# Patient Record
Sex: MALE | Race: WHITE | NOT HISPANIC OR LATINO | Employment: OTHER | URBAN - METROPOLITAN AREA
[De-identification: names, ages, dates, MRNs, and addresses within clinical notes are randomized per-mention and may not be internally consistent; named-entity substitution may affect disease eponyms.]

---

## 2017-01-10 DIAGNOSIS — I10 ESSENTIAL (PRIMARY) HYPERTENSION: ICD-10-CM

## 2017-01-10 DIAGNOSIS — E78.1 PURE HYPERGLYCERIDEMIA: ICD-10-CM

## 2017-01-10 DIAGNOSIS — R73.09 OTHER ABNORMAL GLUCOSE: ICD-10-CM

## 2017-01-10 DIAGNOSIS — Z12.5 ENCOUNTER FOR SCREENING FOR MALIGNANT NEOPLASM OF PROSTATE: ICD-10-CM

## 2017-01-10 DIAGNOSIS — E78.5 HYPERLIPIDEMIA: ICD-10-CM

## 2017-01-10 DIAGNOSIS — Z13.6 ENCOUNTER FOR SCREENING FOR CARDIOVASCULAR DISORDERS: ICD-10-CM

## 2017-01-17 ENCOUNTER — GENERIC CONVERSION - ENCOUNTER (OUTPATIENT)
Dept: OTHER | Facility: OTHER | Age: 67
End: 2017-01-17

## 2017-01-17 LAB
A/G RATIO (HISTORICAL): 1.9 (ref 1.1–2.5)
ALBUMIN SERPL BCP-MCNC: 4.2 G/DL (ref 3.6–4.8)
ALP SERPL-CCNC: 99 IU/L (ref 39–117)
ALT SERPL W P-5'-P-CCNC: 20 IU/L (ref 0–44)
AMBIG ABBREV CMP14 DEFAULT (HISTORICAL): NORMAL
AST SERPL W P-5'-P-CCNC: 18 IU/L (ref 0–40)
BILIRUB SERPL-MCNC: 0.3 MG/DL (ref 0–1.2)
BUN SERPL-MCNC: 32 MG/DL (ref 8–27)
BUN/CREA RATIO (HISTORICAL): 19 (ref 10–22)
CALCIUM SERPL-MCNC: 9.2 MG/DL (ref 8.6–10.2)
CHLORIDE SERPL-SCNC: 99 MMOL/L (ref 96–106)
CHOLEST SERPL-MCNC: 289 MG/DL (ref 100–199)
CO2 SERPL-SCNC: 21 MMOL/L (ref 18–29)
CREAT SERPL-MCNC: 1.68 MG/DL (ref 0.76–1.27)
EGFR AFRICAN AMERICAN (HISTORICAL): 49 ML/MIN/1.73
EGFR-AMERICAN CALC (HISTORICAL): 42 ML/MIN/1.73
GLUCOSE SERPL-MCNC: 112 MG/DL (ref 65–99)
HBA1C MFR BLD HPLC: 6 % (ref 4.8–5.6)
HDLC SERPL-MCNC: 38 MG/DL
LDL CHOLESTEROL DIRECT (HISTORICAL): 161 MG/DL (ref 0–99)
LDLC SERPL CALC-MCNC: ABNORMAL MG/DL (ref 0–99)
POTASSIUM SERPL-SCNC: 4.3 MMOL/L (ref 3.5–5.2)
PROSTATE SPECIFIC ANTIGEN (HISTORICAL): 2.6 NG/ML (ref 0–4)
SODIUM SERPL-SCNC: 136 MMOL/L (ref 134–144)
TOT. GLOBULIN, SERUM (HISTORICAL): 2.2 G/DL (ref 1.5–4.5)
TOTAL PROTEIN (HISTORICAL): 6.4 G/DL (ref 6–8.5)
TRIGL SERPL-MCNC: 537 MG/DL (ref 0–149)

## 2017-01-18 ENCOUNTER — GENERIC CONVERSION - ENCOUNTER (OUTPATIENT)
Dept: OTHER | Facility: OTHER | Age: 67
End: 2017-01-18

## 2017-01-21 LAB
INTERPRETATION (HISTORICAL): NORMAL
INTERPRETATION (HISTORICAL): NORMAL
PDF IMAGE (HISTORICAL): NORMAL

## 2017-01-23 ENCOUNTER — ALLSCRIPTS OFFICE VISIT (OUTPATIENT)
Dept: OTHER | Facility: OTHER | Age: 67
End: 2017-01-23

## 2017-03-13 ENCOUNTER — ALLSCRIPTS OFFICE VISIT (OUTPATIENT)
Dept: OTHER | Facility: OTHER | Age: 67
End: 2017-03-13

## 2017-04-25 ENCOUNTER — GENERIC CONVERSION - ENCOUNTER (OUTPATIENT)
Dept: OTHER | Facility: OTHER | Age: 67
End: 2017-04-25

## 2017-04-25 LAB
A/G RATIO (HISTORICAL): 1.9 (ref 1.2–2.2)
ALBUMIN SERPL BCP-MCNC: 4.3 G/DL (ref 3.6–4.8)
ALP SERPL-CCNC: 84 IU/L (ref 39–117)
ALT SERPL W P-5'-P-CCNC: 23 IU/L (ref 0–44)
AST SERPL W P-5'-P-CCNC: 22 IU/L (ref 0–40)
BILIRUB SERPL-MCNC: 0.4 MG/DL (ref 0–1.2)
BUN SERPL-MCNC: 31 MG/DL (ref 8–27)
BUN/CREA RATIO (HISTORICAL): 21 (ref 10–24)
CALCIUM SERPL-MCNC: 9.1 MG/DL (ref 8.6–10.2)
CHLORIDE SERPL-SCNC: 102 MMOL/L (ref 96–106)
CO2 SERPL-SCNC: 20 MMOL/L (ref 18–29)
CREAT SERPL-MCNC: 1.46 MG/DL (ref 0.76–1.27)
EGFR AFRICAN AMERICAN (HISTORICAL): 57 ML/MIN/1.73
EGFR-AMERICAN CALC (HISTORICAL): 49 ML/MIN/1.73
GLUCOSE SERPL-MCNC: 117 MG/DL (ref 65–99)
POTASSIUM SERPL-SCNC: 4.5 MMOL/L (ref 3.5–5.2)
SODIUM SERPL-SCNC: 140 MMOL/L (ref 134–144)
TOT. GLOBULIN, SERUM (HISTORICAL): 2.3 G/DL (ref 1.5–4.5)
TOTAL PROTEIN (HISTORICAL): 6.6 G/DL (ref 6–8.5)

## 2017-04-27 ENCOUNTER — GENERIC CONVERSION - ENCOUNTER (OUTPATIENT)
Dept: OTHER | Facility: OTHER | Age: 67
End: 2017-04-27

## 2017-04-29 ENCOUNTER — GENERIC CONVERSION - ENCOUNTER (OUTPATIENT)
Dept: OTHER | Facility: OTHER | Age: 67
End: 2017-04-29

## 2017-04-29 LAB
CHOLEST SERPL-MCNC: 272 MG/DL (ref 100–199)
HDLC SERPL-MCNC: 39 MG/DL
INTERPRETATION (HISTORICAL): NORMAL
INTERPRETATION (HISTORICAL): NORMAL
LDL CHOLESTEROL DIRECT (HISTORICAL): 152 MG/DL (ref 0–99)
LDLC SERPL CALC-MCNC: ABNORMAL MG/DL (ref 0–99)
PDF IMAGE (HISTORICAL): NORMAL
TRIGL SERPL-MCNC: 451 MG/DL (ref 0–149)

## 2017-06-19 ENCOUNTER — ALLSCRIPTS OFFICE VISIT (OUTPATIENT)
Dept: OTHER | Facility: OTHER | Age: 67
End: 2017-06-19

## 2017-07-19 ENCOUNTER — GENERIC CONVERSION - ENCOUNTER (OUTPATIENT)
Dept: OTHER | Facility: OTHER | Age: 67
End: 2017-07-19

## 2017-07-24 ENCOUNTER — ALLSCRIPTS OFFICE VISIT (OUTPATIENT)
Dept: OTHER | Facility: OTHER | Age: 67
End: 2017-07-24

## 2017-07-24 DIAGNOSIS — E78.1 PURE HYPERGLYCERIDEMIA: ICD-10-CM

## 2017-07-24 DIAGNOSIS — M79.609 PAIN IN EXTREMITY: ICD-10-CM

## 2017-07-25 ENCOUNTER — GENERIC CONVERSION - ENCOUNTER (OUTPATIENT)
Dept: OTHER | Facility: OTHER | Age: 67
End: 2017-07-25

## 2017-07-25 LAB
A/G RATIO (HISTORICAL): 1.6 (ref 1.2–2.2)
ALBUMIN SERPL BCP-MCNC: 4.2 G/DL (ref 3.6–4.8)
ALP SERPL-CCNC: 94 IU/L (ref 39–117)
ALT SERPL W P-5'-P-CCNC: 21 IU/L (ref 0–44)
AST SERPL W P-5'-P-CCNC: 16 IU/L (ref 0–40)
BILIRUB SERPL-MCNC: 0.3 MG/DL (ref 0–1.2)
BUN SERPL-MCNC: 27 MG/DL (ref 8–27)
BUN/CREA RATIO (HISTORICAL): 19 (ref 10–24)
CALCIUM SERPL-MCNC: 9 MG/DL (ref 8.6–10.2)
CHLORIDE SERPL-SCNC: 102 MMOL/L (ref 96–106)
CO2 SERPL-SCNC: 22 MMOL/L (ref 18–29)
CREAT SERPL-MCNC: 1.4 MG/DL (ref 0.76–1.27)
EGFR AFRICAN AMERICAN (HISTORICAL): 60 ML/MIN/1.73
EGFR-AMERICAN CALC (HISTORICAL): 52 ML/MIN/1.73
GLUCOSE SERPL-MCNC: 120 MG/DL (ref 65–99)
HBA1C MFR BLD HPLC: 5.9 % (ref 4.8–5.6)
POTASSIUM SERPL-SCNC: 4 MMOL/L (ref 3.5–5.2)
SODIUM SERPL-SCNC: 139 MMOL/L (ref 134–144)
TOT. GLOBULIN, SERUM (HISTORICAL): 2.6 G/DL (ref 1.5–4.5)
TOTAL PROTEIN (HISTORICAL): 6.8 G/DL (ref 6–8.5)

## 2017-07-27 LAB — CORTIS AM PEAK SERPL-SCNC: 6.8 UG/DL (ref 6.2–19.4)

## 2017-07-28 LAB
CHOLEST SERPL-MCNC: 257 MG/DL (ref 100–199)
HDLC SERPL-MCNC: 39 MG/DL
INTERPRETATION (HISTORICAL): NORMAL
INTERPRETATION (HISTORICAL): NORMAL
LDL CHOLESTEROL DIRECT (HISTORICAL): 151 MG/DL (ref 0–99)
LDLC SERPL CALC-MCNC: ABNORMAL MG/DL (ref 0–99)
PDF IMAGE (HISTORICAL): NORMAL
TRIGL SERPL-MCNC: 459 MG/DL (ref 0–149)

## 2017-07-30 ENCOUNTER — GENERIC CONVERSION - ENCOUNTER (OUTPATIENT)
Dept: OTHER | Facility: OTHER | Age: 67
End: 2017-07-30

## 2017-10-25 ENCOUNTER — HOSPITAL ENCOUNTER (EMERGENCY)
Facility: HOSPITAL | Age: 67
Discharge: HOME | End: 2017-10-25
Attending: EMERGENCY MEDICINE | Admitting: EMERGENCY MEDICINE
Payer: COMMERCIAL

## 2017-10-25 ENCOUNTER — APPOINTMENT (EMERGENCY)
Dept: RADIOLOGY | Facility: HOSPITAL | Age: 67
End: 2017-10-25
Payer: COMMERCIAL

## 2017-10-25 VITALS
OXYGEN SATURATION: 95 % | WEIGHT: 159 LBS | TEMPERATURE: 98.2 F | DIASTOLIC BLOOD PRESSURE: 80 MMHG | BODY MASS INDEX: 29.26 KG/M2 | HEART RATE: 77 BPM | HEIGHT: 62 IN | RESPIRATION RATE: 18 BRPM | SYSTOLIC BLOOD PRESSURE: 152 MMHG

## 2017-10-25 DIAGNOSIS — R10.9 ABDOMINAL PAIN: Primary | ICD-10-CM

## 2017-10-25 DIAGNOSIS — R07.9 CHEST PAIN: ICD-10-CM

## 2017-10-25 LAB
ALBUMIN SERPL BCP-MCNC: 3.5 G/DL (ref 3.5–5)
ALP SERPL-CCNC: 124 U/L (ref 46–116)
ALT SERPL W P-5'-P-CCNC: 29 U/L (ref 12–78)
ANION GAP SERPL CALCULATED.3IONS-SCNC: 8 MMOL/L (ref 4–13)
APTT PPP: 27 SECONDS (ref 24–33)
AST SERPL W P-5'-P-CCNC: 17 U/L (ref 5–45)
BASOPHILS # BLD AUTO: 0.1 THOUSANDS/ΜL (ref 0–0.1)
BASOPHILS NFR BLD AUTO: 1 % (ref 0–1)
BILIRUB SERPL-MCNC: 0.2 MG/DL (ref 0.2–1)
BILIRUB UR QL STRIP: NEGATIVE
BUN SERPL-MCNC: 31 MG/DL (ref 5–25)
CALCIUM SERPL-MCNC: 9.1 MG/DL (ref 8.3–10.1)
CHLORIDE SERPL-SCNC: 100 MMOL/L (ref 100–108)
CLARITY UR: CLEAR
CO2 SERPL-SCNC: 28 MMOL/L (ref 21–32)
COLOR UR: NORMAL
CREAT SERPL-MCNC: 1.51 MG/DL (ref 0.6–1.3)
EOSINOPHIL # BLD AUTO: 0.1 THOUSAND/ΜL (ref 0–0.61)
EOSINOPHIL NFR BLD AUTO: 2 % (ref 0–6)
ERYTHROCYTE [DISTWIDTH] IN BLOOD BY AUTOMATED COUNT: 12.9 % (ref 11.6–15.1)
GFR SERPL CREATININE-BSD FRML MDRD: 47 ML/MIN/1.73SQ M
GLUCOSE SERPL-MCNC: 114 MG/DL (ref 65–140)
GLUCOSE UR STRIP-MCNC: NEGATIVE MG/DL
HCT VFR BLD AUTO: 41.3 % (ref 42–52)
HGB BLD-MCNC: 13.4 G/DL (ref 14–18)
HGB UR QL STRIP.AUTO: NEGATIVE
INR PPP: 0.93 (ref 0.86–1.16)
KETONES UR STRIP-MCNC: NEGATIVE MG/DL
LEUKOCYTE ESTERASE UR QL STRIP: NEGATIVE
LIPASE SERPL-CCNC: 109 U/L (ref 73–393)
LYMPHOCYTES # BLD AUTO: 1.1 THOUSANDS/ΜL (ref 0.6–4.47)
LYMPHOCYTES NFR BLD AUTO: 19 % (ref 14–44)
MCH RBC QN AUTO: 28.6 PG (ref 27–31)
MCHC RBC AUTO-ENTMCNC: 32.6 G/DL (ref 31.4–37.4)
MCV RBC AUTO: 88 FL (ref 82–98)
MONOCYTES # BLD AUTO: 0.6 THOUSAND/ΜL (ref 0.17–1.22)
MONOCYTES NFR BLD AUTO: 11 % (ref 4–12)
NEUTROPHILS # BLD AUTO: 3.8 THOUSANDS/ΜL (ref 1.85–7.62)
NEUTS SEG NFR BLD AUTO: 68 % (ref 43–75)
NITRITE UR QL STRIP: NEGATIVE
PH UR STRIP.AUTO: 6 [PH] (ref 5–9)
PLATELET # BLD AUTO: 259 THOUSANDS/UL (ref 130–400)
PMV BLD AUTO: 8.6 FL (ref 8.9–12.7)
POTASSIUM SERPL-SCNC: 3.8 MMOL/L (ref 3.5–5.3)
PROT SERPL-MCNC: 7.2 G/DL (ref 6.4–8.2)
PROT UR STRIP-MCNC: NEGATIVE MG/DL
PROTHROMBIN TIME: 9.8 SECONDS (ref 9.4–11.7)
RBC # BLD AUTO: 4.7 MILLION/UL (ref 4.7–6.1)
SODIUM SERPL-SCNC: 136 MMOL/L (ref 136–145)
SP GR UR STRIP.AUTO: 1.01 (ref 1–1.03)
TROPONIN I SERPL-MCNC: <0.02 NG/ML
TROPONIN I SERPL-MCNC: <0.02 NG/ML
UROBILINOGEN UR QL STRIP.AUTO: 0.2 E.U./DL
WBC # BLD AUTO: 5.7 THOUSAND/UL (ref 4.8–10.8)

## 2017-10-25 PROCEDURE — 71010 HB CHEST X-RAY 1 VIEW FRONTAL (PORTABLE): CPT

## 2017-10-25 PROCEDURE — 83690 ASSAY OF LIPASE: CPT | Performed by: EMERGENCY MEDICINE

## 2017-10-25 PROCEDURE — 85730 THROMBOPLASTIN TIME PARTIAL: CPT | Performed by: EMERGENCY MEDICINE

## 2017-10-25 PROCEDURE — 96361 HYDRATE IV INFUSION ADD-ON: CPT

## 2017-10-25 PROCEDURE — 36415 COLL VENOUS BLD VENIPUNCTURE: CPT | Performed by: EMERGENCY MEDICINE

## 2017-10-25 PROCEDURE — 96360 HYDRATION IV INFUSION INIT: CPT

## 2017-10-25 PROCEDURE — 85025 COMPLETE CBC W/AUTO DIFF WBC: CPT | Performed by: EMERGENCY MEDICINE

## 2017-10-25 PROCEDURE — 80053 COMPREHEN METABOLIC PANEL: CPT | Performed by: EMERGENCY MEDICINE

## 2017-10-25 PROCEDURE — 99285 EMERGENCY DEPT VISIT HI MDM: CPT

## 2017-10-25 PROCEDURE — 85610 PROTHROMBIN TIME: CPT | Performed by: EMERGENCY MEDICINE

## 2017-10-25 PROCEDURE — 84484 ASSAY OF TROPONIN QUANT: CPT | Performed by: EMERGENCY MEDICINE

## 2017-10-25 PROCEDURE — 93005 ELECTROCARDIOGRAM TRACING: CPT | Performed by: EMERGENCY MEDICINE

## 2017-10-25 PROCEDURE — 81003 URINALYSIS AUTO W/O SCOPE: CPT | Performed by: EMERGENCY MEDICINE

## 2017-10-25 RX ORDER — MULTIVITAMIN
1 CAPSULE ORAL DAILY
COMMUNITY
End: 2018-02-07 | Stop reason: CLARIF

## 2017-10-25 RX ORDER — MAGNESIUM CARB/ALUMINUM HYDROX 105-160MG
296 TABLET,CHEWABLE ORAL ONCE
Status: COMPLETED | OUTPATIENT
Start: 2017-10-25 | End: 2017-10-25

## 2017-10-25 RX ORDER — RAMIPRIL 10 MG/1
10 CAPSULE ORAL DAILY
COMMUNITY
End: 2018-02-07 | Stop reason: SDUPTHER

## 2017-10-25 RX ORDER — TRAMADOL HYDROCHLORIDE 50 MG/1
50 TABLET ORAL
COMMUNITY
End: 2017-10-26 | Stop reason: DRUGHIGH

## 2017-10-25 RX ORDER — VERAPAMIL HYDROCHLORIDE 180 MG/1
180 CAPSULE, EXTENDED RELEASE ORAL
COMMUNITY
End: 2018-02-07

## 2017-10-25 RX ORDER — MONTELUKAST SODIUM 10 MG/1
10 TABLET ORAL
COMMUNITY
End: 2018-02-07 | Stop reason: SDUPTHER

## 2017-10-25 RX ORDER — TRIAMTERENE AND HYDROCHLOROTHIAZIDE 75; 50 MG/1; MG/1
1 TABLET ORAL DAILY
COMMUNITY
End: 2018-02-07

## 2017-10-25 RX ORDER — PANTOPRAZOLE SODIUM 40 MG/1
40 TABLET, DELAYED RELEASE ORAL DAILY
COMMUNITY
End: 2019-04-15 | Stop reason: SDUPTHER

## 2017-10-25 RX ADMIN — SODIUM CHLORIDE 1000 ML: 0.9 INJECTION, SOLUTION INTRAVENOUS at 19:20

## 2017-10-25 RX ADMIN — MAGESIUM CITRATE 296 ML: 1.75 LIQUID ORAL at 22:32

## 2017-10-25 NOTE — ED PROVIDER NOTES
History  Chief Complaint   Patient presents with    Chest Pain     Pt reports pain in chest muscle that started last thursday  Pt reports "It feels like somrthing is going to come right out of me " when pt moves  Pt reports taking Aspirin makes it feel a little better  Pt reports some discomfort with deep inspiration  Patient states he has had pain across the chest since last Thursday  Patient describes the pain as a muscle, and that it is worse with certain movements and position  He has had no shortness of breath or diaphoresis  Pain has been consistent there every day  He states it is somewhat worse with coughing or laughing, but does not have a cough or fever  Pain has improved with aspirin  Patient has never had cardiac problems or workup  He is borderline diabetic was exposed to passive smoke            Prior to Admission Medications   Prescriptions Last Dose Informant Patient Reported? Taking?    Ascorbic Acid (AYDEE-C PO) 10/25/2017 at Unknown time Self Yes Yes   Sig: Take by mouth daily   Multiple Vitamin (MULTIVITAMIN) capsule 10/25/2017 at Unknown time Self Yes Yes   Sig: Take 1 capsule by mouth daily   Thiamine HCl (B-1 PO) 10/25/2017 at Unknown time Self Yes Yes   Sig: Take by mouth daily   montelukast (SINGULAIR) 10 mg tablet  Self Yes Yes   Sig: Take 10 mg by mouth daily with breakfast   pantoprazole (PROTONIX) 40 mg tablet 10/25/2017 at Unknown time Self Yes Yes   Sig: Take 40 mg by mouth daily   ramipril (ALTACE) 10 MG capsule 10/25/2017 at Unknown time Self Yes Yes   Sig: Take 10 mg by mouth daily   traMADol (ULTRAM) 50 mg tablet  Self Yes Yes   Sig: Take 50 mg by mouth daily at bedtime   triamterene-hydrochlorothiazide (MAXZIDE) 75-50 MG per tablet 10/25/2017 at Unknown time Self Yes Yes   Sig: Take 1 tablet by mouth daily   verapamil (VERELAN PM) 180 MG 24 hr capsule 10/25/2017 at Unknown time Self Yes Yes   Sig: Take 180 mg by mouth daily after breakfast      Facility-Administered Medications: None       Past Medical History:   Diagnosis Date    Chronic pain     lower back    Diabetes mellitus ([facility] [address] )     boarderline diabetes    Hypertension     Migraine        History reviewed  No pertinent surgical history  History reviewed  No pertinent family history  I have reviewed and agree with the history as documented  Social History   Substance Use Topics    Smoking status: Passive Smoke Exposure - Never Smoker    Smokeless tobacco: Never Used    Alcohol use 2 4 oz/week     4 Glasses of wine per week        Review of Systems   Constitutional: Negative for fever  HENT: Negative for congestion  Respiratory: Negative for cough and shortness of breath  Cardiovascular: Positive for chest pain  Negative for palpitations and leg swelling  Gastrointestinal: Positive for constipation  Negative for abdominal pain  Genitourinary: Negative for dysuria  Musculoskeletal: Negative for back pain  All other systems reviewed and are negative  Physical Exam  ED Triage Vitals [10/25/17 1837]   Temperature Pulse Respirations Blood Pressure SpO2   98 2 °F (36 8 °C) 69 18 145/68 98 %      Temp Source Heart Rate Source Patient Position - Orthostatic VS BP Location FiO2 (%)   Oral Monitor Lying Right arm --      Pain Score       Worst Possible Pain           Orthostatic Vital Signs  Vitals:    10/25/17 1837 10/25/17 1945 10/25/17 2015 10/25/17 2045   BP: 145/68 (!) 171/89  152/80   Pulse: 69 74 76 77   Patient Position - Orthostatic VS: Lying Lying  Lying       Physical Exam   Constitutional: He is oriented to person, place, and time  He appears well-developed and well-nourished  HENT:   Head: Normocephalic and atraumatic  Mouth/Throat: Oropharynx is clear and moist    Eyes: Conjunctivae are normal    Neck: Normal range of motion  Neck supple  Cardiovascular: Normal rate, regular rhythm, normal heart sounds and intact distal pulses      Pulmonary/Chest: Effort normal and breath sounds normal  He has no wheezes  He has no rales  He exhibits no tenderness  Abdominal: Soft  Bowel sounds are normal  There is tenderness  There is tenderness in the upper abdomen on palpation, no rebound   Musculoskeletal: Normal range of motion  He exhibits no edema  Neurological: He is alert and oriented to person, place, and time  Skin: Skin is warm and dry  Psychiatric: He has a normal mood and affect  His behavior is normal    Vitals reviewed  ED Medications  Medications   magnesium citrate (CITROMA) oral solution 296 mL (not administered)   sodium chloride 0 9 % bolus 1,000 mL (0 mL Intravenous Stopped 10/25/17 2053)       Diagnostic Studies  Results Reviewed     Procedure Component Value Units Date/Time    Troponin I [[id number]]  (Normal) Collected:  10/25/17 2111    Lab Status:  Final result Specimen:  Blood from Arm, Left Updated:  10/25/17 2141     Troponin I <0 02 ng/mL     Narrative:         Siemens Chemistry analyzer 99% cutoff is > 0 04 ng/mL in network labs    o cTnI 99% cutoff is useful only when applied to patients in the clinical setting of myocardial ischemia  o cTnI 99% cutoff should be interpreted in the context of clinical history, ECG findings and possibly cardiac imaging to establish correct diagnosis  o cTnI 99% cutoff may be suggestive but clearly not indicative of a coronary event without the clinical setting of myocardial ischemia  Troponin I [[id number]]  (Normal) Collected:  10/25/17 1912    Lab Status:  Final result Specimen:  Blood from Arm, Left Updated:  10/25/17 1948     Troponin I <0 02 ng/mL     Narrative:         Siemens Chemistry analyzer 99% cutoff is > 0 04 ng/mL in network labs    o cTnI 99% cutoff is useful only when applied to patients in the clinical setting of myocardial ischemia  o cTnI 99% cutoff should be interpreted in the context of clinical history, ECG findings and possibly cardiac imaging to establish correct diagnosis    o cTnI 99% cutoff may be suggestive but clearly not indicative of a coronary event without the clinical setting of myocardial ischemia  Comprehensive metabolic panel [[id number]]  (Abnormal) Collected:  10/25/17 1912    Lab Status:  Final result Specimen:  Blood from Arm, Left Updated:  10/25/17 1945     Sodium 136 mmol/L      Potassium 3 8 mmol/L      Chloride 100 mmol/L      CO2 28 mmol/L      Anion Gap 8 mmol/L      BUN 31 (H) mg/dL      Creatinine 1 51 (H) mg/dL      Glucose 114 mg/dL      Calcium 9 1 mg/dL      AST 17 U/L      ALT 29 U/L      Alkaline Phosphatase 124 (H) U/L      Total Protein 7 2 g/dL      Albumin 3 5 g/dL      Total Bilirubin 0 20 mg/dL      eGFR 47 ml/min/1 73sq m     Narrative:         National Kidney Disease Education Program recommendations are as follows:  GFR calculation is accurate only with a steady state creatinine  Chronic Kidney disease less than 60 ml/min/1 73 sq  meters  Kidney failure less than 15 ml/min/1 73 sq  meters  Lipase [[id number]]  (Normal) Collected:  10/25/17 1912    Lab Status:  Final result Specimen:  Blood from Arm, Left Updated:  10/25/17 1945     Lipase 109 u/L     APTT [[id number]]  (Normal) Collected:  10/25/17 1912    Lab Status:  Final result Specimen:  Blood from Arm, Left Updated:  10/25/17 1943     PTT 27 seconds     Narrative:          Therapeutic Heparin Range = 60-90 seconds    Protime-INR [[id number]]  (Normal) Collected:  10/25/17 1912    Lab Status:  Final result Specimen:  Blood from Arm, Left Updated:  10/25/17 1943     Protime 9 8 seconds      INR 0 93    UA w Reflex to Microscopic [[id number]]  (Normal) Collected:  10/25/17 1935    Lab Status:  Final result Specimen:  Urine from Urine, Clean Catch Updated:  10/25/17 1941     Color, UA Light Yellow     Clarity, UA Clear     Specific Gravity, UA 1 010     pH, UA 6 0     Leukocytes, UA Negative     Nitrite, UA Negative     Protein, UA Negative mg/dl      Glucose, UA Negative mg/dl      Ketones, UA Negative mg/dl Urobilinogen, UA 0 2 E U /dl      Bilirubin, UA Negative     Blood, UA Negative    CBC and differential [[id number]]  (Abnormal) Collected:  10/25/17 1912    Lab Status:  Final result Specimen:  Blood from Arm, Left Updated:  10/25/17 1927     WBC 5 70 Thousand/uL      RBC 4 70 Million/uL      Hemoglobin 13 4 (L) g/dL      Hematocrit 41 3 (L) %      MCV 88 fL      MCH 28 6 pg      MCHC 32 6 g/dL      RDW 12 9 %      MPV 8 6 (L) fL      Platelets 508 Thousands/uL      Neutrophils Relative 68 %      Lymphocytes Relative 19 %      Monocytes Relative 11 %      Eosinophils Relative 2 %      Basophils Relative 1 %      Neutrophils Absolute 3 80 Thousands/µL      Lymphocytes Absolute 1 10 Thousands/µL      Monocytes Absolute 0 60 Thousand/µL      Eosinophils Absolute 0 10 Thousand/µL      Basophils Absolute 0 10 Thousands/µL                  XR chest 1 view portable    (Results Pending)              Procedures  ECG 12 Lead Documentation  Date/Time: 10/25/2017 7:05 PM  Performed by: Lukas Savage  Authorized by: Lukas Savage     Indications / Diagnosis:  Chest pain  ECG reviewed by me, the ED Provider: yes    Patient location:  ED  Interpretation:     Interpretation: normal    Rate:     ECG rate:  70    ECG rate assessment: normal    Rhythm:     Rhythm: sinus rhythm    Ectopy:     Ectopy: none    QRS:     QRS axis:  Normal    QRS intervals:  Normal  Conduction:     Conduction: normal    ST segments:     ST segments:  Normal  T waves:     T waves: normal             Phone Contacts  ED Phone Contact    ED Course  ED Course                                MDM  Number of Diagnoses or Management Options  Abdominal pain:   Chest pain:   Diagnosis management comments: Patient is a normal EKG and atypical symptoms for CAD, so my index of suspicion is low, but I will check cardiac labs  His abdominal discomfort may be related to constipation    Family member tells me this is a chronic problem with him    Reid Time    Disposition  Final diagnoses:   Abdominal pain   Chest pain     Time reflects when diagnosis was documented in both MDM as applicable and the Disposition within this note     Time User Action Codes Description Comment    10/25/2017 10:02 PM Trang DAMON Add [R10 9] Abdominal pain     10/25/2017 10:02 PM Kedar Eduardo Add [R07 9] Chest pain       ED Disposition     ED Disposition Condition Comment    Discharge  Ted Stanley discharge to home/self care  Condition at discharge: Stable        Follow-up Information     Follow up With Specialties Details Why [address] [facility],  Family Medicine Schedule an appointment as soon as possible for a visit in 1 day  [address] [place] [address]  [phone]          Patient's Medications   Discharge Prescriptions    No medications on file     No discharge procedures on file      ED Provider  Electronically Signed by           Regina Scott MD  10/25/17 8315

## 2017-10-26 ENCOUNTER — APPOINTMENT (EMERGENCY)
Dept: RADIOLOGY | Facility: HOSPITAL | Age: 67
End: 2017-10-26
Payer: COMMERCIAL

## 2017-10-26 ENCOUNTER — GENERIC CONVERSION - ENCOUNTER (OUTPATIENT)
Dept: OTHER | Facility: OTHER | Age: 67
End: 2017-10-26

## 2017-10-26 ENCOUNTER — HOSPITAL ENCOUNTER (EMERGENCY)
Facility: HOSPITAL | Age: 67
Discharge: HOME | End: 2017-10-26
Attending: EMERGENCY MEDICINE
Payer: COMMERCIAL

## 2017-10-26 VITALS
RESPIRATION RATE: 16 BRPM | HEART RATE: 85 BPM | DIASTOLIC BLOOD PRESSURE: 78 MMHG | BODY MASS INDEX: 29.26 KG/M2 | SYSTOLIC BLOOD PRESSURE: 153 MMHG | WEIGHT: 159 LBS | TEMPERATURE: 98.7 F | HEIGHT: 62 IN | OXYGEN SATURATION: 95 %

## 2017-10-26 DIAGNOSIS — K59.00 CONSTIPATION: Primary | ICD-10-CM

## 2017-10-26 DIAGNOSIS — R10.9 ABDOMINAL PAIN: ICD-10-CM

## 2017-10-26 LAB
ALBUMIN SERPL BCP-MCNC: 3.4 G/DL (ref 3.5–5)
ALP SERPL-CCNC: 130 U/L (ref 46–116)
ALT SERPL W P-5'-P-CCNC: 21 U/L (ref 12–78)
ANION GAP SERPL CALCULATED.3IONS-SCNC: 9 MMOL/L (ref 4–13)
AST SERPL W P-5'-P-CCNC: 12 U/L (ref 5–45)
ATRIAL RATE: 70 BPM
BASOPHILS # BLD AUTO: 0 THOUSANDS/ΜL (ref 0–0.1)
BASOPHILS NFR BLD AUTO: 1 % (ref 0–1)
BILIRUB SERPL-MCNC: 0.4 MG/DL (ref 0.2–1)
BILIRUB UR QL STRIP: NEGATIVE
BUN SERPL-MCNC: 23 MG/DL (ref 5–25)
CALCIUM SERPL-MCNC: 9.1 MG/DL (ref 8.3–10.1)
CHLORIDE SERPL-SCNC: 104 MMOL/L (ref 100–108)
CLARITY UR: CLEAR
CO2 SERPL-SCNC: 28 MMOL/L (ref 21–32)
COLOR UR: YELLOW
CREAT SERPL-MCNC: 1.2 MG/DL (ref 0.6–1.3)
EOSINOPHIL # BLD AUTO: 0.1 THOUSAND/ΜL (ref 0–0.61)
EOSINOPHIL NFR BLD AUTO: 1 % (ref 0–6)
ERYTHROCYTE [DISTWIDTH] IN BLOOD BY AUTOMATED COUNT: 14 % (ref 11.6–15.1)
GFR SERPL CREATININE-BSD FRML MDRD: 63 ML/MIN/1.73SQ M
GLUCOSE SERPL-MCNC: 129 MG/DL (ref 65–140)
GLUCOSE UR STRIP-MCNC: NEGATIVE MG/DL
HCT VFR BLD AUTO: 41.5 % (ref 42–52)
HGB BLD-MCNC: 13.9 G/DL (ref 14–18)
HGB UR QL STRIP.AUTO: NEGATIVE
KETONES UR STRIP-MCNC: NEGATIVE MG/DL
LEUKOCYTE ESTERASE UR QL STRIP: NEGATIVE
LIPASE SERPL-CCNC: 127 U/L (ref 73–393)
LYMPHOCYTES # BLD AUTO: 0.8 THOUSANDS/ΜL (ref 0.6–4.47)
LYMPHOCYTES NFR BLD AUTO: 12 % (ref 14–44)
MCH RBC QN AUTO: 29.7 PG (ref 27–31)
MCHC RBC AUTO-ENTMCNC: 33.6 G/DL (ref 31.4–37.4)
MCV RBC AUTO: 88 FL (ref 82–98)
MONOCYTES # BLD AUTO: 0.5 THOUSAND/ΜL (ref 0.17–1.22)
MONOCYTES NFR BLD AUTO: 7 % (ref 4–12)
NEUTROPHILS # BLD AUTO: 5.3 THOUSANDS/ΜL (ref 1.85–7.62)
NEUTS SEG NFR BLD AUTO: 80 % (ref 43–75)
NITRITE UR QL STRIP: NEGATIVE
NRBC BLD AUTO-RTO: 0 /100 WBCS
P AXIS: 38 DEGREES
PH UR STRIP.AUTO: 8 [PH] (ref 5–9)
PLATELET # BLD AUTO: 268 THOUSANDS/UL (ref 130–400)
PMV BLD AUTO: 8.7 FL (ref 8.9–12.7)
POTASSIUM SERPL-SCNC: 3.8 MMOL/L (ref 3.5–5.3)
PR INTERVAL: 156 MS
PROT SERPL-MCNC: 7 G/DL (ref 6.4–8.2)
PROT UR STRIP-MCNC: NEGATIVE MG/DL
QRS AXIS: -1 DEGREES
QRSD INTERVAL: 96 MS
QT INTERVAL: 416 MS
QTC INTERVAL: 449 MS
RBC # BLD AUTO: 4.7 MILLION/UL (ref 4.7–6.1)
SODIUM SERPL-SCNC: 141 MMOL/L (ref 136–145)
SP GR UR STRIP.AUTO: 1.01 (ref 1–1.03)
T WAVE AXIS: 37 DEGREES
TROPONIN I SERPL-MCNC: <0.02 NG/ML
UROBILINOGEN UR QL STRIP.AUTO: 0.2 E.U./DL
VENTRICULAR RATE: 70 BPM
WBC # BLD AUTO: 6.7 THOUSAND/UL (ref 4.8–10.8)

## 2017-10-26 PROCEDURE — 83690 ASSAY OF LIPASE: CPT | Performed by: EMERGENCY MEDICINE

## 2017-10-26 PROCEDURE — 84484 ASSAY OF TROPONIN QUANT: CPT | Performed by: EMERGENCY MEDICINE

## 2017-10-26 PROCEDURE — 36415 COLL VENOUS BLD VENIPUNCTURE: CPT | Performed by: EMERGENCY MEDICINE

## 2017-10-26 PROCEDURE — 99284 EMERGENCY DEPT VISIT MOD MDM: CPT

## 2017-10-26 PROCEDURE — 96361 HYDRATE IV INFUSION ADD-ON: CPT

## 2017-10-26 PROCEDURE — 93005 ELECTROCARDIOGRAM TRACING: CPT | Performed by: EMERGENCY MEDICINE

## 2017-10-26 PROCEDURE — 74177 CT ABD & PELVIS W/CONTRAST: CPT

## 2017-10-26 PROCEDURE — 80053 COMPREHEN METABOLIC PANEL: CPT | Performed by: EMERGENCY MEDICINE

## 2017-10-26 PROCEDURE — 96360 HYDRATION IV INFUSION INIT: CPT

## 2017-10-26 PROCEDURE — 81003 URINALYSIS AUTO W/O SCOPE: CPT | Performed by: EMERGENCY MEDICINE

## 2017-10-26 PROCEDURE — 85025 COMPLETE CBC W/AUTO DIFF WBC: CPT | Performed by: EMERGENCY MEDICINE

## 2017-10-26 RX ORDER — MORPHINE SULFATE 4 MG/ML
4 INJECTION, SOLUTION INTRAMUSCULAR; INTRAVENOUS ONCE
Status: DISCONTINUED | OUTPATIENT
Start: 2017-10-26 | End: 2017-10-26

## 2017-10-26 RX ORDER — DICYCLOMINE HCL 20 MG
20 TABLET ORAL 2 TIMES DAILY
Qty: 10 TABLET | Refills: 0 | Status: SHIPPED | OUTPATIENT
Start: 2017-10-26 | End: 2019-02-01 | Stop reason: ALTCHOICE

## 2017-10-26 RX ORDER — POLYETHYLENE GLYCOL 3350 17 G/17G
17 POWDER, FOR SOLUTION ORAL DAILY
Qty: 14 EACH | Refills: 0 | Status: SHIPPED | OUTPATIENT
Start: 2017-10-26 | End: 2018-02-07 | Stop reason: CLARIF

## 2017-10-26 RX ORDER — ONDANSETRON 2 MG/ML
4 INJECTION INTRAMUSCULAR; INTRAVENOUS ONCE
Status: DISCONTINUED | OUTPATIENT
Start: 2017-10-26 | End: 2017-10-26

## 2017-10-26 RX ORDER — TRAMADOL HYDROCHLORIDE 200 MG/1
200 TABLET, EXTENDED RELEASE ORAL DAILY
COMMUNITY
End: 2018-07-20 | Stop reason: SDUPTHER

## 2017-10-26 RX ADMIN — IOHEXOL 100 ML: 350 INJECTION, SOLUTION INTRAVENOUS at 13:19

## 2017-10-26 RX ADMIN — SODIUM CHLORIDE 1000 ML: 0.9 INJECTION, SOLUTION INTRAVENOUS at 12:47

## 2017-10-26 NOTE — DISCHARGE INSTRUCTIONS

## 2017-10-26 NOTE — ED PROVIDER NOTES
History  Chief Complaint   Patient presents with    Abdominal Pain     Pt was discharged from [address] [facility]  Still having upper abdominal pain  Pt called PCP this morning concerning pain and was told to come back to ER      [age]-year-old male 61-year-old male presents with diffuse abdominal pain that started a few days ago getting worse  Patient was seen here yesterday in the ER for chest pain evaluated with negative labs and discharged home on Mag citrate  He is reporting constipation for the past few days  He is passing flatus  Denies nausea vomiting diarrhea fevers chills dysuria urgency frequency or any other symptoms  The pain is continuous nonradiating currently a 6/10 nothing makes it better or worse  History provided by:  Patient   used: No        Prior to Admission Medications   Prescriptions Last Dose Informant Patient Reported? Taking?    Ascorbic Acid (AYDEE-C PO) 10/25/2017 at Unknown time Self Yes Yes   Sig: Take by mouth daily   Multiple Vitamin (MULTIVITAMIN) capsule 10/25/2017 at Unknown time Self Yes Yes   Sig: Take 1 capsule by mouth daily   Thiamine HCl (B-1 PO) 10/25/2017 at Unknown time Self Yes Yes   Sig: Take by mouth daily   montelukast (SINGULAIR) 10 mg tablet 10/25/2017 at Unknown time Self Yes Yes   Sig: Take 10 mg by mouth daily with breakfast   pantoprazole (PROTONIX) 40 mg tablet 10/25/2017 at Unknown time Self Yes Yes   Sig: Take 40 mg by mouth daily   ramipril (ALTACE) 10 MG capsule 10/25/2017 at Unknown time Self Yes Yes   Sig: Take 10 mg by mouth daily   traMADol (ULTRAM-ER) 200 MG 24 hr tablet 10/24/2017 at 1800 Self Yes Yes   Sig: Take 200 mg by mouth daily At bedtime   triamterene-hydrochlorothiazide (MAXZIDE) 75-50 MG per tablet 10/25/2017 at Unknown time Self Yes Yes   Sig: Take 1 tablet by mouth daily   verapamil (VERELAN PM) 180 MG 24 hr capsule 10/25/2017 at Unknown time Self Yes Yes   Sig: Take 180 mg by mouth daily after breakfast Facility-Administered Medications: None       Past Medical History:   Diagnosis Date    Chronic pain     lower back    Diabetes mellitus ([facility] [address] )     boarderline diabetes    Hypertension     Migraine        No past surgical history on file  No family history on file  I have reviewed and agree with the history as documented  Social History   Substance Use Topics    Smoking status: Passive Smoke Exposure - Never Smoker    Smokeless tobacco: Never Used    Alcohol use 2 4 oz/week     4 Glasses of wine per week        Review of Systems   All other systems reviewed and are negative  Physical Exam  ED Triage Vitals [10/26/17 1217]   Temperature Pulse Respirations Blood Pressure SpO2   98 7 °F (37 1 °C) 92 14 146/95 95 %      Temp Source Heart Rate Source Patient Position - Orthostatic VS BP Location FiO2 (%)   Oral Monitor Lying Right arm --      Pain Score       9           Orthostatic Vital Signs  Vitals:    10/26/17 1217 10/26/17 1330 10/26/17 1350   BP: 146/95  153/78   Pulse: 92 92 85   Patient Position - Orthostatic VS: Lying  Lying       Physical Exam   Constitutional: He is oriented to person, place, and time  He appears well-developed and well-nourished  HENT:   Head: Normocephalic and atraumatic  Eyes: EOM are normal  Pupils are equal, round, and reactive to light  Neck: Normal range of motion  Neck supple  Cardiovascular: Normal rate and regular rhythm  Pulmonary/Chest: Effort normal and breath sounds normal    Abdominal: Soft  Bowel sounds are normal  He exhibits no distension and no mass  There is tenderness  There is no rebound and no guarding  No hernia  Musculoskeletal: Normal range of motion  Neurological: He is alert and oriented to person, place, and time  Skin: Skin is warm and dry  Psychiatric: He has a normal mood and affect  Nursing note and vitals reviewed        ED Medications  Medications   sodium chloride 0 9 % bolus 1,000 mL (0 mL Intravenous Stopped 10/26/17 1435)   iohexol (OMNIPAQUE) 350 MG/ML injection (MULTI-DOSE) 100 mL (100 mL Intravenous Given 10/26/17 1319)       Diagnostic Studies  Results Reviewed     Procedure Component Value Units Date/Time    UA w Reflex to Microscopic [[id number]]  (Normal) Collected:  10/26/17 1430    Lab Status:  Final result Specimen:  Urine from Urine, Clean Catch Updated:  10/26/17 1436     Color, UA Yellow     Clarity, UA Clear     Specific Gravity, UA 1 010     pH, UA 8 0     Leukocytes, UA Negative     Nitrite, UA Negative     Protein, UA Negative mg/dl      Glucose, UA Negative mg/dl      Ketones, UA Negative mg/dl      Urobilinogen, UA 0 2 E U /dl      Bilirubin, UA Negative     Blood, UA Negative    Comprehensive metabolic panel [[id number]]  (Abnormal) Collected:  10/26/17 1247    Lab Status:  Final result Specimen:  Blood from Arm, Right Updated:  10/26/17 1352     Sodium 141 mmol/L      Potassium 3 8 mmol/L      Chloride 104 mmol/L      CO2 28 mmol/L      Anion Gap 9 mmol/L      BUN 23 mg/dL      Creatinine 1 20 mg/dL      Glucose 129 mg/dL      Calcium 9 1 mg/dL      AST 12 U/L      ALT 21 U/L      Alkaline Phosphatase 130 (H) U/L      Total Protein 7 0 g/dL      Albumin 3 4 (L) g/dL      Total Bilirubin 0 40 mg/dL      eGFR 63 ml/min/1 73sq m     Narrative:         National Kidney Disease Education Program recommendations are as follows:  GFR calculation is accurate only with a steady state creatinine  Chronic Kidney disease less than 60 ml/min/1 73 sq  meters  Kidney failure less than 15 ml/min/1 73 sq  meters      Lipase [[id number]]  (Normal) Collected:  10/26/17 1247    Lab Status:  Final result Specimen:  Blood from Arm, Right Updated:  10/26/17 1351     Lipase 127 u/L     Troponin I [[id number]]  (Normal) Collected:  10/26/17 1247    Lab Status:  Final result Specimen:  Blood from Arm, Right Updated:  10/26/17 1351     Troponin I <0 02 ng/mL     Narrative:         [employer] Chemistry analyzer 99% cutoff is > 0 04 ng/mL in network labs    o cTnI 99% cutoff is useful only when applied to patients in the clinical setting of myocardial ischemia  o cTnI 99% cutoff should be interpreted in the context of clinical history, ECG findings and possibly cardiac imaging to establish correct diagnosis  o cTnI 99% cutoff may be suggestive but clearly not indicative of a coronary event without the clinical setting of myocardial ischemia  CBC and differential [[id number]]  (Abnormal) Collected:  10/26/17 1247    Lab Status:  Final result Specimen:  Blood from Arm, Right Updated:  10/26/17 1300     WBC 6 70 Thousand/uL      RBC 4 70 Million/uL      Hemoglobin 13 9 (L) g/dL      Hematocrit 41 5 (L) %      MCV 88 fL      MCH 29 7 pg      MCHC 33 6 g/dL      RDW 14 0 %      MPV 8 7 (L) fL      Platelets 557 Thousands/uL      nRBC 0 /100 WBCs      Neutrophils Relative 80 (H) %      Lymphocytes Relative 12 (L) %      Monocytes Relative 7 %      Eosinophils Relative 1 %      Basophils Relative 1 %      Neutrophils Absolute 5 30 Thousands/µL      Lymphocytes Absolute 0 80 Thousands/µL      Monocytes Absolute 0 50 Thousand/µL      Eosinophils Absolute 0 10 Thousand/µL      Basophils Absolute 0 00 Thousands/µL                  CT abdomen pelvis with contrast   Final Result by Tatyana Salinas MD (10/26 3762)      No acute inflammatory stranding      No evidence of bowel obstruction    Fluid-filled mildly dilated ascending colon with moderate amount of fecal debris within the descending colon and sigmoid colon      There is a incidental asymmetric thickening of the left-sided urinary bladder wall, consider urology evaluation for further characterization, this May be inflammatory or neoplastic      A small hypodense lesion seen within the left hepatic lobe in image 23 of series 2 measuring about 1 cm and is indeterminate can be characterized with MRI performed on nonemergent basis         ##imslh##imslh         ##[id number]##fuslh3         Workstation performed: [id number]                    Procedures  ECG 12 Lead Documentation  Date/Time: 10/26/2017 2:11 PM  Performed by: Leeanna Ag by: Leland Wills     ECG reviewed by me, the ED Provider: yes    Patient location:  ED  Previous ECG:     Previous ECG:  Unavailable    Comparison to cardiac monitor: Yes    Interpretation:     Interpretation: non-specific    Rate:     ECG rate assessment: normal    Rhythm:     Rhythm: sinus rhythm    Ectopy:     Ectopy: none    QRS:     QRS axis:  Normal  Conduction:     Conduction: normal    ST segments:     ST segments:  Non-specific  T waves:     T waves: normal               Phone Contacts  ED Phone Contact    ED Course  ED Course                                MDM  Number of Diagnoses or Management Options  Abdominal pain:   Constipation:   Diagnosis management comments: Abdominal pain, constipation, bowel obstruction, bowel perforation, colitis, diverticulitis, pancreatitis, medical screening    Discussed the CT scan findings with the patient and his sister  I referred him to urologist with the abnormal findings in the urinary bladder  Amount and/or Complexity of Data Reviewed  Clinical lab tests: ordered and reviewed  Tests in the radiology section of CPT®: ordered and reviewed  Tests in the medicine section of CPT®: ordered and reviewed    Patient Progress  Patient progress: stable    CritCare Time    Disposition  Final diagnoses:   Constipation   Abdominal pain     Time reflects when diagnosis was documented in both MDM as applicable and the Disposition within this note     Time User Action Codes Description Comment    10/26/2017  2:51 PM Bree Foy Add [K59 00] Constipation     10/26/2017  2:51 PM Neena Foy Add [R10 9] Abdominal pain       ED Disposition     ED Disposition Condition Comment    Discharge  Nate  discharge to home/self care      Condition at discharge: Stable        Follow-up Information     Follow up With Specialties Details Why Contact Info Additional Information    Ebony Benavides DO Family Medicine Schedule an appointment as soon as possible for a visit  [address] [facility] [address] Emergency Department Emergency Medicine  If symptoms worsen [address] [facility]  [phone] [facility], [facility], [facility]    Josue White MD Urology Schedule an appointment as soon as possible for a visit  Len Hood [address]  [address] [place] [address]  [phone]           Patient's Medications   Discharge Prescriptions    DICYCLOMINE (BENTYL) 20 MG TABLET    Take 1 tablet by mouth 2 (two) times a day for 5 days       Start Date: 10/26/2017End Date: 10/31/2017       Order Dose: 20 mg       Quantity: 10 tablet    Refills: 0    POLYETHYLENE GLYCOL (MIRALAX) 17 G PACKET    Take 17 g by mouth daily       Start Date: 10/26/2017End Date: --       Order Dose: 17 g       Quantity: 14 each    Refills: 0     No discharge procedures on file      ED Provider  Electronically Signed by           Regina Gamboa DO  10/26/17 1458

## 2017-10-26 NOTE — DISCHARGE INSTRUCTIONS
Abdominal Pain   WHAT YOU NEED TO KNOW:   Abdominal pain can be dull, achy, or sharp  You may have pain in one area of your abdomen, or in your entire abdomen  Your pain may be caused by a condition such as constipation, food sensitivity or poisoning, infection, or a blockage  Abdominal pain can also be from a hernia, appendicitis, or an ulcer  Liver, gallbladder, or kidney conditions can also cause abdominal pain  The cause of your abdominal pain may be unknown  DISCHARGE INSTRUCTIONS:   Return to the emergency department if:   · You have new chest pain or shortness of breath  · You have pulsing pain in your upper abdomen or lower back that suddenly becomes constant  · Your pain is in the right lower abdominal area and worsens with movement  · You have a fever over 100 4°F (38°C) or shaking chills  · You are vomiting and cannot keep food or liquids down  · Your pain does not improve or gets worse over the next 8 to 12 hours  · You see blood in your vomit or bowel movements, or they look black and tarry  · Your skin or the whites of your eyes turn yellow  · You are a woman and have a large amount of vaginal bleeding that is not your monthly period  Contact your healthcare provider if:   · You have pain in your lower back  · You are a man and have pain in your testicles  · You have pain when you urinate  · You have questions or concerns about your condition or care  Follow up with your healthcare provider within 24 hours or as directed:  Write down your questions so you remember to ask them during your visits  Medicines:   · Medicines  may be given to calm your stomach and prevent vomiting or to decrease pain  Ask how to take pain medicine safely  · Take your medicine as directed  Contact your healthcare provider if you think your medicine is not helping or if you have side effects  Tell him of her if you are allergic to any medicine   Keep a list of the medicines, vitamins, and herbs you take  Include the amounts, and when and why you take them  Bring the list or the pill bottles to follow-up visits  Carry your medicine list with you in case of an emergency  © 2017 [employer]  Information is for End User's use only and may not be sold, redistributed or otherwise used for commercial purposes  All illustrations and images included in CareNotes® are the copyrighted property of A D A M [employer] Inc  or [employer]  The above information is an  only  It is not intended as medical advice for individual conditions or treatments  Talk to your doctor, nurse or pharmacist before following any medical regimen to see if it is safe and effective for you  Constipation   WHAT YOU NEED TO KNOW:   Constipation is when you have hard, dry bowel movements, or you go longer than usual between bowel movements  DISCHARGE INSTRUCTIONS:   Return to the emergency department if:   · You have blood in your bowel movements  · You have a fever and abdominal pain with the constipation  Contact your healthcare provider if:   · Your constipation gets worse  · You start to vomit  · You have questions or concerns about your condition or care  Medicines:   · Medicine or a fiber supplement  may help make your bowel movement softer  A laxative may help relax and loosen your intestines to help you have a bowel movement  You may also be given medicine to increase fluid in your intestines  The fluid may help move bowel movements through your intestines  · Take your medicine as directed  Contact your healthcare provider if you think your medicine is not helping or if you have side effects  Tell him of her if you are allergic to any medicine  Keep a list of the medicines, vitamins, and herbs you take  Include the amounts, and when and why you take them  Bring the list or the pill bottles to follow-up visits   Carry your medicine list with you in case of an emergency  Manage your constipation:   · Drink liquids as directed  You may need to drink extra liquids to help soften and move your bowels  Ask how much liquid to drink each day and which liquids are best for you  · Eat high-fiber foods  This may help decrease constipation by adding bulk to your bowel movements  High-fiber foods include fruit, vegetables, whole-grain breads and cereals, and beans  Your healthcare provider or dietitian can help you create a high-fiber meal plan  · Exercise regularly  Regular physical activity can help stimulate your intestines  Ask which exercises are best for you  · Schedule a time each day to have a bowel movement  This may help train your body to have regular bowel movements  Bend forward while you are on the toilet to help move the bowel movement out  Sit on the toilet for at least 10 minutes, even if you do not have a bowel movement  Follow up with your healthcare provider as directed:  Write down your questions so you remember to ask them during your visits  © 2017 [employer]  Information is for End User's use only and may not be sold, redistributed or otherwise used for commercial purposes  All illustrations and images included in CareNotes® are the copyrighted property of A D A [employer]  or [employer]  The above information is an  only  It is not intended as medical advice for individual conditions or treatments  Talk to your doctor, nurse or pharmacist before following any medical regimen to see if it is safe and effective for you

## 2017-10-26 NOTE — ED NOTES
Pt reports pain is lessened at this time to upper abdomen, even with getting OOB to use urinal   No distress noted         Cristy Garcia RN  10/25/17 2053

## 2017-10-27 LAB
ATRIAL RATE: 88 BPM
P AXIS: 32 DEGREES
PR INTERVAL: 148 MS
QRS AXIS: 4 DEGREES
QRSD INTERVAL: 94 MS
QT INTERVAL: 370 MS
QTC INTERVAL: 447 MS
T WAVE AXIS: 56 DEGREES
VENTRICULAR RATE: 88 BPM

## 2017-11-10 ENCOUNTER — ALLSCRIPTS OFFICE VISIT (OUTPATIENT)
Dept: OTHER | Facility: OTHER | Age: 67
End: 2017-11-10

## 2017-11-10 ENCOUNTER — GENERIC CONVERSION - ENCOUNTER (OUTPATIENT)
Dept: OTHER | Facility: OTHER | Age: 67
End: 2017-11-10

## 2017-11-11 NOTE — PROGRESS NOTES
Assessment    1  Allergic rhinitis (477 9) (J30 9)   2  Benign essential hypertension (401 1) (I10)   3  GE reflux (530 81) (K21 9)   4  Migraine (346 90) (G43 909)   5  IFG (impaired fasting glucose) (790 21) (R73 01)   6  BMI 31 0-31 9,adult (V85 31) (Z68 31)   7  Obesity (BMI 30-39 9) (278 00) (E66 9)    Plan  Allergic rhinitis    · Montelukast Sodium 10 MG Oral Tablet; Take 1 tablet daily   · Triamcinolone Acetonide 55 MCG/ACT Nasal Aerosol; 2 puffs/nostril/day  Benign essential hypertension    · Atenolol 50 MG Oral Tablet; 1 every day   · Ramipril 10 MG Oral Capsule; take 1 capsule by mouth once daily   · Triamterene-HCTZ 75-50 MG Oral Tablet; TAKE 1/2 TABLET DAILY  Obesity (BMI 30-39  9)    · Begin a limited exercise program ; Status:Complete;   Done: 23XQP3879 04:59PM   · Eat a low fat and low cholesterol diet ; Status:Complete;   Done: 97JUQ1324 04:59PM   · Shared Decision Making Aid given; Status:Complete;   Done: 55LXQ4430 04:59PM   · Some eating tips that can help you lose weight ; Status:Complete;   Done: 44Tyu141354:59PM   · We recommend that you bring your body mass index down to 26 ; Status:Complete;  Done: 22CHH0616 04:59PM    Discussion/Summary  The patient was counseled regarding risk factor reductions,-- impressions,-- risks and benefits of treatment options  Possible side effects of new medications were reviewed with the patient/guardian today  The treatment plan was reviewed with the patient/guardian  The patient/guardian understands and agrees with the treatment plan      Chief Complaint  pt present for ER follow up on constipation   ac/cma      History of Present Illness  bladder wall thickening on CTon liver on CT for which MRI suggestedenlargementmattersome abd painDr  Mittalmiralax bid and benefiberegd and colonoscopy 12/8/17stablehard at times, like pelletspast month of constipationwalkinglose wtedemadizzinessDOEstable, no wheeze or hivesstable, no n/v      Review of Systems Cardiovascular: no chest pain-- and-- no palpitations  Neurological: headache, but-- no dizziness  Active Problems    1  Allergic rhinitis (477 9) (J30 9)   2  Benign colon polyp (211 3) (K63 5)   3  Benign essential hypertension (401 1) (I10)   4  BPH (benign prostatic hypertrophy) (600 00) (N40 0)   5  Cervical radiculopathy (723 4) (M54 12)   6  Colon cancer screening (V76 51) (Z12 11)   7  Depression screening (V79 0) (Z13 89)   8  Diverticulosis of large intestine without perforation or abscess without bleeding (562 10) (K57 30)   9  [place] cardiac risk >20% in next 10 years (V49 89) (Z91 89)   10  GE reflux (530 81) (K21 9)   11  High triglycerides (272 1) (E78 1)   12  Hyperlipidemia LDL goal <130 (272 4) (E78 5)   13  IFG (impaired fasting glucose) (790 21) (R73 01)   14  Immunization due (V05 9) (Z23)   15  Inguinal hernia (550 90) (K40 90)   16  Internal hemorrhoids (455 0) (K64 8)   17  Limb pain (729 5) (M79 609)   18  Prostate cancer screening (V76 44) (Z12 5)   19  Screening for cardiovascular, respiratory, and genitourinary diseases  (V81 2,V81 4,V81 6) (Z13 6,Z13 83,Z13 89)   20  Screening for diabetes mellitus (DM) (V77 1) (Z13 1)   21  Sebaceous cyst (706 2) (L72 3)   22  Situational anxiety (300 09) (F41 8)   23  Spine disorder (724 9) (M53 9)    Past Medical History  1  History of Ankle Sprain (845 00)   2  History of Closed Fracture Of One Rib (807 01)   3  History of Cough (786 2) (R05)   4  History of abdominal pain (V13 89) (Z87 898)   5  History of acute bronchitis (V12 69) (Z87 09)   6  History of chest pain (V13 89) (Z87 898)   7  History of heartburn (V12 79) (Z87 898)   8  History of hyperlipidemia (V12 29) (Z86 39)   9  History of sebaceous cyst (V13 3) (Z87 2)   10  History of tendinitis (V13 59) (Z87 39)   11  History of Impacted cerumen, unspecified laterality (380 4) (H61 20)   12  History of Limb pain (729 5) (M79 609)   13   Well adult on routine health check (V70 0) (Z00 00)    Family History  Mother    1  Family history of Arthritis (V17 7)  Father    2  Family history of Lung Cancer (V16 1)  Brother    3  Family history of Asthma (V17 5)    The family history was reviewed and updated today  Social History     · Being A Social Drinker   · Never A Smoker  The social history was reviewed and updated today  Current Meds   1  Montelukast Sodium 10 MG Oral Tablet; Take 1 tablet daily; Therapy: 82TWT6175 to (Last Rx:21Mar2017)  Requested for: 46Tpy6292 Ordered   2  Multi-Day Oral Tablet; TAKE 1 TABLET DAILY; Therapy: 95SEB2225 to Recorded   3  Pantoprazole Sodium 40 MG Oral Tablet Delayed Release; 1 every day; Therapy: 27VKK7813 to (Evaluate:29May2014); Last Rx:29Apr2014 Ordered   4  Ramipril 10 MG Oral Capsule; take 1 capsule by mouth once daily; Therapy: 98NZN3682 to (Last Rx:30Jun2017)  Requested for: 44Tya1126 Ordered   5  TraMADol HCl  MG Oral Tablet Extended Release 24 Hour; Take 1 tablet by mouth at bedtime; Therapy: 81Yoc1602 to (Evaluate:21Nov2017)  Requested for: 44BRO0928; Last Rx:28Hjb6596 Ordered   6  Triamcinolone Acetonide 55 MCG/ACT Nasal Aerosol; 2 puffs/nostril/day; Therapy: 05WNW7813 to (Last Rx:07Apr2015)  Requested for: 53Zrh1227 Ordered   7  Triamterene-HCTZ 75-50 MG Oral Tablet; TAKE 1/2 TABLET DAILY; Therapy: 07AVS0134 to (Evaluate:18Jan2018)  Requested for: 89Ncw1754; Last Rx:23Jan2017; Status: ACTIVE - Transmit to Pharmacy - Awaiting Verification Ordered   8  Verapamil HCl  MG Oral Tablet Extended Release; 1 two times a day; Therapy: 22IEO9906 to  Requested for: 49Ulr6252 Recorded   9  Vitamin B-1 100 MG Oral Tablet; 1 every day; Therapy: 93UAE2604 to Recorded   10  Vitamin C 500 MG Oral Tablet; 1 every day; Therapy: 89APC7579 to  Requested for: 82HFU9700 Recorded    Allergies  1   No Known Drug Allergies    Vitals  Vital Signs    Recorded: 07YSF4224 02:32PM   Temperature 97 5 F   Heart Rate 76   Respiration 20   Systolic 220 Diastolic 80   Height 5 ft    Weight 162 lb    BMI Calculated 31 64   BSA Calculated 1 71       Physical Exam   Constitutional  General appearance: No acute distress, well appearing and well nourished  Eyes  Conjunctiva and lids: No swelling, erythema, or discharge  Pupils and irises: Equal, round and reactive to light  Ears, Nose, Mouth, and Throat  External inspection of ears and nose: Normal    Otoscopic examination: Tympanic membrance translucent with normal light reflex  Canals patent without erythema  Nasal mucosa, septum, and turbinates: Normal without edema or erythema  Oropharynx: Normal with no erythema, edema, exudate or lesions  Pulmonary  Respiratory effort: No increased work of breathing or signs of respiratory distress  Auscultation of lungs: Clear to auscultation, equal breath sounds bilaterally, no wheezes, no rales, no rhonci  Cardiovascular  Auscultation of heart: Normal rate and rhythm, normal S1 and S2, without murmurs  Examination of extremities for edema and/or varicosities: Normal    Abdomen  Abdomen: Non-tender, no masses  -- obese  Lymphatic  Palpation of lymph nodes in neck: No lymphadenopathy  Musculoskeletal  Gait and station: Normal    Digits and nails: Normal without clubbing or cyanosis  Skin  Skin and subcutaneous tissue: Normal without rashes or lesions  Psychiatric  Mood and affect: Normal          Provider Comments    constipationverapamil and rechecktopamax if headaches get more frequentmiralaxcurrently about 1-3x/monthDr Chandni Lin for bp and headache frequencyprn only to see if less constipationtopamax for headaches unless seeing neurologistgerd/allergies stable      Future Appointments    Date/Time Provider Specialty Site   12/04/2017 02:30 PM Luis Armando [place]   01/22/2018 03:15 PM Luis Armando [address]   11/14/2017 02:45 PM EUN Jesus   Neurology [facility]       Signatures   Electronically signed by : Ese Antonio DO; Nov 10 2017  5:07PM EST                       (Author)

## 2017-11-14 ENCOUNTER — ALLSCRIPTS OFFICE VISIT (OUTPATIENT)
Dept: OTHER | Facility: OTHER | Age: 67
End: 2017-11-14

## 2017-11-15 NOTE — PROGRESS NOTES
Assessment    1  Migraine without aura (346 10) (G43 009)    Plan  Migraine without aura    · Magnesium Oxide 400 MG Oral Tablet; TAKE 1 TABLET daily if tolerated take onetab twice  DAILY   Rx By: Killian Dunham; Dispense: 0 Days ; #:60 Tablet; Refill: 3;Migraine without aura; TOBIAS = N; Verified Transmission to [address] [place] [address]; Last Updated By: SystemGEOLID; 11/14/2017 3:22:40 PM    Discussion/Summary  Discussion Summary:   Patient will now remain on Propranolol 80mg ER for prevention and if it doesnât work, will consider elavil or topamax  place him on magnesium supplements as well  importance of hydration  Counseling Documentation With Imm: The patient was counseled regarding instructions for management,-- risk factor reductions,-- prognosis,-- patient and family education,-- impressions,-- risks and benefits of treatment options,-- importance of compliance with treatment  total time of encounter was 30 minutes-- and-- 20 minutes was spent counseling  Goals and Barriers: The patient has the current Goals: To have less headaches  Patient's Capacity to Self-Care: Patient is able to Self-Care  Medication SE Review and Pt Understands Tx: Possible side effects of new medications were reviewed with the patient/guardian today  The treatment plan was reviewed with the patient/guardian  The patient/guardian understands and agrees with the treatment plan   Patient Guardian understands agrees: The treatment plan was reviewed with the patient/guardian  The patient/guardian understands and agrees with the treatment plan   Headache [facility]:  The patient was counseled regarding;   Discussed side effects of all medications prescribed today to the patient in detail  Patient education was completed today and we also discussed precautions for rebound headaches  --   When patient has a moderate to severe headache, they should seek rest, initiate relaxation and apply cold compresses to the head    Also recommended to the patient :  1  Maintain regular sleep schedule -- 2  Limit over the counter medications  (No more than 3 times a week)  -- 3  Maintain headache diary  -- 4  Limit caffeine to 1-2 cups a day or less  -- 5  Avoid dietary trigger  (list given to the patient and reviewed with them)  -- 6  Patient is to have regular frequent meals to prevent headache onset  Self Referrals:   Self Referrals: No      Chief Complaint  Chief Complaint Free Text Note Form: headaches      History of Present Illness  HPI: Mr Geovanna Mayorga is a 77 yo M with PMH of migraines since 1970 presents as follow up  He is a former patient of Dr Pradip Hood  His headaches were fairly controlled on Verapamil 180mg daily  Recently he was having a lot of abdominal pain and was found to have severe constipation on Oct 25th and it was switched to propranolol 80mg ER that started today  Sister says he doesn't drink enough water in a day which doesn't help with his headaches and constipation  He used to get 15+ headaches a month but since he's drinking more water he hasn't had many  has not been tried on any other preventive headache medication  Review of Systems  Neurological ROS:  Constitutional: no fever, no chills, no recent weight gain, no recent weight loss, no complaints of feeling tired, no changes in appetite  Neurological General: headache, but-- no nausea or vomiting,-- no lightheadedness,-- no convulsions,-- no syncope-- and-- no trauma  Active Problems    1  Allergic rhinitis (477 9) (J30 9)   2  Benign colon polyp (211 3) (K63 5)   3  Benign essential hypertension (401 1) (I10)   4  BMI 31 0-31 9,adult (V85 31) (Z68 31)   5  BPH (benign prostatic hypertrophy) (600 00) (N40 0)   6  Cervical radiculopathy (723 4) (M54 12)   7  Colon cancer screening (V76 51) (Z12 11)   8  Depression screening (V79 0) (Z13 89)   9  Diverticulosis of large intestine without perforation or abscess without bleeding (562 10) (K57 30)   10   [place] cardiac risk >20% in next 10 years (V49 89) (Z91 89)   11  GE reflux (530 81) (K21 9)   12  High triglycerides (272 1) (E78 1)   13  Hyperlipidemia LDL goal <130 (272 4) (E78 5)   14  IFG (impaired fasting glucose) (790 21) (R73 01)   15  Immunization due (V05 9) (Z23)   16  Inguinal hernia (550 90) (K40 90)   17  Internal hemorrhoids (455 0) (K64 8)   18  Limb pain (729 5) (M79 609)   19  Migraine (346 90) (G43 909)   20  Obesity (BMI 30-39 9) (278 00) (E66 9)   21  Prostate cancer screening (V76 44) (Z12 5)   22  Screening for cardiovascular, respiratory, and genitourinary diseases  (V81 2,V81 4,V81 6) (Z13 6,Z13 83,Z13 89)   23  Screening for diabetes mellitus (DM) (V77 1) (Z13 1)   24  Sebaceous cyst (706 2) (L72 3)   25  Situational anxiety (300 09) (F41 8)   26  Spine disorder (724 9) (M53 9)    Past Medical History  1  History of Ankle Sprain (845 00)   2  History of Closed Fracture Of One Rib (807 01)   3  History of Cough (786 2) (R05)   4  History of abdominal pain (V13 89) (Z87 898)   5  History of acute bronchitis (V12 69) (Z87 09)   6  History of chest pain (V13 89) (Z87 898)   7  History of heartburn (V12 79) (Z87 898)   8  History of hyperlipidemia (V12 29) (Z86 39)   9  History of sebaceous cyst (V13 3) (Z87 2)   10  History of tendinitis (V13 59) (Z87 39)   11  History of Impacted cerumen, unspecified laterality (380 4) (H61 20)   12  History of Limb pain (729 5) (M79 609)   13  Well adult on routine health check (V70 0) (Z00 00)    Family History  Mother    1  Family history of Arthritis (V17 7)  Father    2  Family history of Lung Cancer (V16 1)  Brother    3  Family history of Asthma (V17 5)    Social History     · Being A Social Drinker   · Never A Smoker    Current Meds   1  Montelukast Sodium 10 MG Oral Tablet; Take 1 tablet daily; Therapy: 28VDS1296 to (Last Rx:21Mar2017)  Requested for: 90YGJ9298 Ordered   2  Multi-Day Oral Tablet; TAKE 1 TABLET DAILY; Therapy: 76WPS8923 to Recorded   3   Pantoprazole Sodium 40 MG Oral Tablet Delayed Release; 1 every day; Therapy: 78CDU9835 to (Evaluate:29May2014); Last Rx:29Apr2014 Ordered   4  Propranolol HCl ER 80 MG Oral Capsule Extended Release 24 Hour; take 1 capsule daily; Therapy: 18YGN3567 to (Eri Man)  Requested for: 89QMD2854; Last Rx:10Nov2017 Ordered   5  Ramipril 10 MG Oral Capsule; take 1 capsule by mouth once daily; Therapy: 20GQK3330 to (Last Rx:30Jun2017)  Requested for: 79LIA7113 Ordered   6  TraMADol HCl  MG Oral Tablet Extended Release 24 Hour; Take 1 tablet by mouth at bedtime; Therapy: 91Wvs6787 to (Evaluate:21Nov2017)  Requested for: 67WIU9788; Last Rx:06Ikm5137 Ordered   7  Triamcinolone Acetonide 55 MCG/ACT Nasal Aerosol; 2 puffs/nostril/day; Therapy: 89DSH0559 to (Last Rx:07Apr2015)  Requested for: 22LUX8877 Ordered   8  Triamterene-HCTZ 75-50 MG Oral Tablet; TAKE 1/2 TABLET DAILY; Therapy: 52XRD2608 to (Evaluate:18Jan2018)  Requested for: 29UWD9188; Last Rx:23Jan2017; Status: ACTIVE - Transmit to Pharmacy - Awaiting Verification Ordered   9  Vitamin B-1 100 MG Oral Tablet; 1 every day; Therapy: 29CYK5416 to Recorded   10  Vitamin C 500 MG Oral Tablet; 1 every day; Therapy: 28DVR6640 to  Requested for: 90YIY3909 Recorded    Allergies    1  No Known Drug Allergies    Vitals  Signs   Recorded: 16JVS4359 02:51PM   Heart Rate: 60  Systolic: 455, LUE  Diastolic: 80, LUE  Height: 5 ft   Weight: 162 lb   BMI Calculated: 31 64  BSA Calculated: 1 71  O2 Saturation: 98    Physical Exam   Constitutional  General appearance: No acute distress, well appearing and well nourished  Eyes  Ophthalmoscopic examination: Vision is grossly normal  Gross visual field testing by confrontation shows no abnormalities  EOMI in both eyes  Conjunctivae clear  Eyelids normal palpebral fissures equal  Orbits exhibit normal position  No discharge from the eyes  PERRL  Musculoskeletal  Gait and station: Normal gait, stance and balance     Muscle strength: Normal strength throughout  Muscle tone: No atrophy, abnormal movements, flaccidity, cogwheeling or spasticity  Neurologic  Orientation to person, place, and time: Normal    Recent and remote memory: Abnormal  -- reads at 4th or 6th grader, mild intellectual disability  Attention span and concentration: Normal thought process and attention span  Language: Names objects, able to repeat phrases and speaks spontaneously  Fund of knowledge: Normal vocabulary with appropriate knowledge of current events and past history  2nd cranial nerve: Normal    3rd, 4th, and 6th cranial nerves: Normal    5th cranial nerve: Normal    7th cranial nerve: Normal    8th cranial nerve: Abnormal  -- hearing impairment    9th cranial nerve: Normal    11th cranial nerve: Normal    12th cranial nerve: Normal    Sensation: Normal    Reflexes: Normal    Coordination: Normal        Future Appointments    Date/Time Provider Specialty Site   12/04/2017 02:30 PM Blanca Bliss[address]   01/22/2018 03:15 PM Blanca Bliss[address]       Signatures   Electronically signed by : EUN Kendrick ; Nov 14 2017  3:25PM EST                       (Author)

## 2017-12-04 ENCOUNTER — GENERIC CONVERSION - ENCOUNTER (OUTPATIENT)
Dept: OTHER | Facility: OTHER | Age: 67
End: 2017-12-04

## 2017-12-08 ENCOUNTER — GENERIC CONVERSION - ENCOUNTER (OUTPATIENT)
Dept: FAMILY MEDICINE CLINIC | Facility: CLINIC | Age: 67
End: 2017-12-08

## 2018-01-09 NOTE — RESULT NOTES
Verified Results  (1) COMPREHENSIVE METABOLIC PANEL [id number] 03:33FC [facility] [employer]     Test Name Result Flag Reference   Glucose, Serum 112 mg/dL H 65-99   BUN 32 mg/dL H 8-27   Creatinine, Serum 1 68 mg/dL H 0 76-1 27   eGFR If NonAfricn Am 42 mL/min/1 73 L >59   eGFR If Africn Am 49 mL/min/1 73 L >59   BUN/Creatinine Ratio 19  10-22   Sodium, Serum 136 mmol/L  134-144   Potassium, Serum 4 3 mmol/L  3 5-5 2   Chloride, Serum 99 mmol/L     Carbon Dioxide, Total 21 mmol/L  18-29   Calcium, Serum 9 2 mg/dL  8 6-10 2   Protein, Total, Serum 6 4 g/dL  6 0-8 5   Albumin, Serum 4 2 g/dL  3 6-4 8   Globulin, Total 2 2 g/dL  1 5-4 5   A/G Ratio 1 9  1 1-2 5   Bilirubin, Total 0 3 mg/dL  0 0-1 2   Alkaline Phosphatase, S 99 IU/L     AST (SGOT) 18 IU/L  0-40   ALT (SGPT) 20 IU/L  0-44     (1) PSA (SCREEN) (Dx V76 44 Screen for Prostate Cancer) [id number] 08:37AM [facility] [employer]     Test Name Result Flag Reference   Prostate Specific Ag, Serum 2 6 ng/mL  0 0-4 0   Roche ECLIA methodology  According to the American Urological Association, Serum PSA should  decrease and remain at undetectable levels after radical  prostatectomy  The AUA defines biochemical recurrence as an initial  PSA value 0 2 ng/mL or greater followed by a subsequent confirmatory  PSA value 0 2 ng/mL or greater  Values obtained with different assay methods or kits cannot be used  interchangeably  Results cannot be interpreted as absolute evidence  of the presence or absence of malignant disease  (1) HEMOGLOBIN A1C [id number] 08:37AM [facility] [employer]     Test Name Result Flag Reference   Hemoglobin A1c 6 0 % H 4 8-5 6   Pre-diabetes: 5 7 - 6 4           Diabetes: >6 4           Glycemic control for adults with diabetes: <7 0       Discussion/Summary   Please keep your office visit as scheduled     Dr Angel Kaur

## 2018-01-10 NOTE — RESULT NOTES
Verified Results  (1) PSA (SCREEN) (Dx V76 44 Screen for Prostate Cancer) [id number] 01:18PM Mikayla [facility]     Test Name Result Flag Reference   PROSTATE SPECIFIC ANTIGEN 3 8 ng/mL  0 0-4 0     (1) HEMOGLOBIN A1C [id number] 11:47AM [facility]   5 7-6 4% impaired fasting glucose  >=6 5% diagnosis of diabetes    Falsely low levels are seen in conditions linked to short RBC life span-  hemolytic anemia, and splenomegaly  Falsely elevated levels are seen in situations where there is an increased production of RBC- receipt of erythropoietin or blood transfusions  Adopted from ADA-Clinical Practice Recommendations     Test Name Result Flag Reference   HEMOGLOBIN A1C 5 8 % H 4 0-5 6   EST  AVG  GLUCOSE 120 mg/dl       (1) COMPREHENSIVE METABOLIC PANEL [id number] 93:91MJ [facility] Streeter   National Kidney Disease Education Program recommendations are as follows:  GFR calculation is accurate only with a steady state creatinine  Chronic Kidney disease less than 60 ml/min/1 73 sq  meters  Kidney failure less than 15 ml/min/1 73 sq  meters  Test Name Result Flag Reference   GLUCOSE,RANDM 107 mg/dL     If the patient is fasting, the ADA then defines impaired fasting glucose as > 100 mg/dL and diabetes as > or equal to 123 mg/dL  SODIUM 139 mmol/L  136-145   POTASSIUM 4 2 mmol/L  3 5-5 3   CHLORIDE 104 mmol/L  100-108   CARBON DIOXIDE 27 mmol/L  21-32   ANION GAP (CALC) 8 mmol/L  4-13   BLOOD UREA NITROGEN 31 mg/dL H 5-25   CREATININE 1 40 mg/dL H 0 60-1 30   Standardized to IDMS reference method   CALCIUM 8 5 mg/dL  8 3-10 1   BILI, TOTAL 0 30 mg/dL  0 20-1 00   ALK PHOSPHATAS 76 U/L     ALT (SGPT) 29 U/L  12-78   AST(SGOT) 17 U/L  5-45   ALBUMIN 3 8 g/dL  3 5-5 0   TOTAL PROTEIN 6 7 g/dL  6 4-8 2   eGFR Non-African American 51 0 ml/min/1 73sq m         Discussion/Summary   Your PSA (prostate) blood test has risen suspiciously in the past year    Though the PSA number is not very high, the change is unusual,  This happened to you in 2010 and you saw a local urologist Dr Demetrius Velasco  You should consider seeing him again for possible further testing/evaluation  Last year it was 2 98  Minerals and liver are normal   Kidney test indicates you should probably drink more water regularly  Sugar levels indicate PreDiabetes     Dr Ward Carrasco

## 2018-01-10 NOTE — RESULT NOTES
Discussion/Summary   Cholesterol is still high but at least the triglyceride number has come down below 500 and medications for high triglycerides are not needed if it stays below 500  Lower is better with diet efforts  Dr Ward Carrasco        Verified Results  [facility]) Cardiovascular Risk Assessment 25Apr2017 08:50AM [facility]     Test Name Result Flag Reference   Interpretation NTAP     PDF Image Not applicable       [facility]) [address] [place] [address] Blvd CKD Program 25Apr2017 08:50AM Mikayla Streeter     Test Name Result Flag Reference   Interpretation Note     -------------------------------  CHRONIC KIDNEY DISEASE:  EGFR, BLOOD PRESSURE, AND PROTEINURIA ASSESSMENT  The regression of eGFR with time is not statistically  significant  Current eGFR is 49 mL/min/1 73mE2 corresponding  to CKD stage 3a  Multiply eGFR by 1 159 if patient is    Potassium is within goal and has not  changed significantly, was 4 3 and now is 4 5 mmol/L  EGFR, BLOOD PRESSURE, AND PROTEINURIA TREATMENT SUGGESTIONS  -  Guidelines recommend a target blood pressure of 140/90 mmHg  or less in CKD patients to reduce cardiovascular risk and  CKD progression  Assessment of albuminuria (urine  albumin:creatinine ratio or urine protein:creatinine ratio  preferred) is recommended at least annually in CKD patients  for staging and disease prognosis  EGFR, BLOOD PRESSURE, AND PROTEINURIA FOLLOW-UP  -  Spot Urine Panel is recommended by KDOQI guidelines, at  least yearly; fasting Renal Panel within 3 months;  -  BONE and MINERAL ASSESSMENT  Calcium is within goal and has not changed significantly,  was 9 2 and now is 9 1 mg/dL  Carbon Dioxide is below goal  and has not changed significantly, was 21 and now is 20  mmol/L  KDOQI guidelines recommend the measurement of  25-hydroxy vitamin D in patients with CKD  BONE and MINERAL TREATMENT SUGGESTIONS  -  Interpretations require simultaneous measurements of serum  calcium and phosphorus   If not on alkali, begin sodium  bicarbonate, one 650 mg pill 2-3 times daily, otherwise  increase dose  BONE and MINERAL FOLLOW-UP  -  fasting Renal Panel within 3 months; fasting PTH with Renal  Panel and 25-Hydroxy Vitamin D are recommended by KDOQI  guidelines, at least yearly;  -  LIPIDS ASSESSMENT  LDL-C is high and has decreased, was 161 and now is 152  mg/dL  Triglyceride is high and has decreased, was 537 and  now is 451 mg/dL  Non-HDL Cholesterol is very high and has  decreased, was 251 and now is 233 mg/dL  HDL-C is low and  has not changed significantly, was 38 and now is 39 mg/dL  LIPIDS TREATMENT SUGGESTIONS  -  Therapeutic lifestyle changes are always valuable to  maintain optimal blood lipid status (diet, exercise, weight  management)  Begin statin  If statin already in use,  consider increasing dose to achieve at least a 50% LDL  reduction from baseline  Moderate or high intensity statin  is preferred  If statin cannot be tolerated or increased,  alternatives include use of an intestinal agent (ezetimibe  or bile acid sequestrant), niacin, and/or fish oil  LIPIDS FOLLOW-UP  -  fasting Lipid Panel within 3 months;  -  ANEMIA ASSESSMENT  Most recent order does not include a CBC Panel or iron  studies  ANEMIA FOLLOW-UP  -  CBC is recommended by KDOQI guidelines, at least yearly;  -------------------------------  DISCLAIMER  These assessments and treatment suggestions are provided as  a convenience in support of the physician-patient  relationship and are not intended to replace the physician's  clinical judgment  They are derived from the national  guidelines in addition to other evidence and expert opinion  The clinician should consider this information within the  context of clinical opinion and the individual patient    SEE GUIDANCE FOR CHRONIC KIDNEY DISEASE PROGRAM: National  Kidney Foundation Kidney Disease Outcomes Quality Initiative  (KDOQI (TM)), with its limitations and disclaimers, are at  www kidney  org/professionals/KDOQI  Kidney Disease Improving  Global Outcomes (KDIGO) clinical practice guidelines are at  http://kdigo  org/home/guidelines/  The members of  Maria Sen national advisory panel are listed at  www  [employer] com  This program is intended for patients who  have been diagnosed with stages 3, 4, or pre-dialysis 5 CKD  It is not intended for children, pregnant patients, or  transplant patients  PDF Image   (1) LIPID PANEL FASTING W DIRECT LDL REFLEX [id number] 08:50AM [facility]     Test Name Result Flag Reference   Cholesterol, Total 272 mg/dL H 100-199   Triglycerides 451 mg/dL H 0-149   HDL Cholesterol 39 mg/dL L >39   LDL Cholesterol Calc Comment mg/dL  0-99   Triglyceride result indicated is too high for an accurate LDL  cholesterol estimation  LDL Chol   (Direct) 152 mg/dL H 0-99

## 2018-01-11 NOTE — RESULT NOTES
Verified Results  (1) COMPREHENSIVE METABOLIC PANEL [id number] 81:28ZM [facility]     Test Name Result Flag Reference   Glucose, Serum 94 mg/dL  65-99   BUN 23 mg/dL  8-27   Creatinine, Serum 1 19 mg/dL  0 76-1 27   eGFR If NonAfricn Am 64 mL/min/1 73  >59   eGFR If Africn Am 74 mL/min/1 73  >59   BUN/Creatinine Ratio 19  10-22   Sodium, Serum 142 mmol/L  134-144   Potassium, Serum 4 9 mmol/L  3 5-5 2   Chloride, Serum 107 mmol/L     Carbon Dioxide, Total 20 mmol/L  18-29   Calcium, Serum 8 8 mg/dL  8 6-10 2   Protein, Total, Serum 6 4 g/dL  6 0-8 5   Albumin, Serum 4 1 g/dL  3 6-4 8   Globulin, Total 2 3 g/dL  1 5-4 5   A/G Ratio 1 8  1 1-2 5   Bilirubin, Total 0 2 mg/dL  0 0-1 2   Alkaline Phosphatase, S 70 IU/L     AST (SGOT) 21 IU/L  0-40   ALT (SGPT) 22 IU/L  0-44     (LC) LP [id number] 12:00AM [facility]     Test Name Result Flag Reference   Cholesterol, Total 223 mg/dL H 100-199   Triglycerides 225 mg/dL H 0-149   HDL Cholesterol 41 mg/dL  >39   According to ATP-III Guidelines, HDL-C >59 mg/dL is considered a  negative risk factor for CHD  VLDL Cholesterol Say 45 mg/dL H 5-40   LDL Cholesterol Calc 137 mg/dL H 0-99     [facility]) Cardiovascular Risk Assessment 18Jun2016 12:00AM [facility]     Test Name Result Flag Reference   Interpretation Note     Supplement report is available  PDF Image   (1) HEMOGLOBIN A1C [id number] 12:00AM [facility]     Test Name Result Flag Reference   Hemoglobin A1c 5 9 % H 4 8-5 6   Pre-diabetes: 5 7 - 6 4           Diabetes: >6 4           Glycemic control for adults with diabetes: <7 0       Discussion/Summary   Please schedule an office appointment    Dr Baldo Bolanos

## 2018-01-12 VITALS
SYSTOLIC BLOOD PRESSURE: 120 MMHG | RESPIRATION RATE: 20 BRPM | WEIGHT: 169 LBS | TEMPERATURE: 98.4 F | BODY MASS INDEX: 33.18 KG/M2 | HEIGHT: 60 IN | DIASTOLIC BLOOD PRESSURE: 76 MMHG | HEART RATE: 80 BPM

## 2018-01-12 VITALS
OXYGEN SATURATION: 98 % | WEIGHT: 162 LBS | BODY MASS INDEX: 31.8 KG/M2 | HEIGHT: 60 IN | SYSTOLIC BLOOD PRESSURE: 123 MMHG | DIASTOLIC BLOOD PRESSURE: 80 MMHG | HEART RATE: 60 BPM

## 2018-01-12 VITALS
RESPIRATION RATE: 20 BRPM | DIASTOLIC BLOOD PRESSURE: 80 MMHG | HEIGHT: 60 IN | BODY MASS INDEX: 31.8 KG/M2 | SYSTOLIC BLOOD PRESSURE: 128 MMHG | TEMPERATURE: 97.5 F | HEART RATE: 76 BPM | WEIGHT: 162 LBS

## 2018-01-12 NOTE — MISCELLANEOUS
Message   Recorded as Task   Date: 10/26/2017 11:47 AM, Created By: Kenny Almaguer   Task Name: Call Back   Assigned To: Arley Ramirez   Regarding Patient: Tobi Keith, Status: Active   Comment:    Kenny Almaguer - 26 Oct 2017 11:47 AM     TASK CREATED  Caller: Sister  She called abd he was in the ER for evaluation yesterday  They told him that he may be full of stool  He stated his stomach is still in bad pain  His sister is taking him back to the ER for evaluation  They gave him citriate of magnesium and he did not move his bowels yet     Arley Ramirez - 26 Oct 2017 1:08 PM     TASK EDITED  ok        Signatures   Electronically signed by : Ebony Benavides DO; Oct 26 2017  1:08PM EST                       (Author)

## 2018-01-12 NOTE — MISCELLANEOUS
Message   Recorded as Task   Date: 11/10/2017 03:36 PM, Created By: Jameson Layton   Task Name: Med Renewal Request   Assigned To: Arley Ramirez   Regarding Patient: Donald Pulse, Status: Active   CommentMargery Simmonds - 10 Nov 2017 3:36 PM     TASK CREATED  Caller: [facility], Pharmacist; Renew Medication  pts Atenolol is not available   please subsitute, i told them you would just escribe new medication for pt  ac/cma   Arley Ramirez - 10 Nov 2017 6:02 PM     TASK EDITED  s/w pharmacy  not avail so will change to propranolol        Signatures   Electronically signed by : Sofie Szymanski DO; Nov 10 2017  6:02PM EST                       (Author)

## 2018-01-13 NOTE — RESULT NOTES
Discussion/Summary   Cortisol level is normal    Sugar is in prediabetes range but kidneys, minerals and liver are normal       Cholesterol is elevated  Diet/exercise/weight loss is recommended  We might need to add more cholesterol medication if cholesterol does not improve, in order to reduce cardiovascular risk    [id number] Taye Joshua        Verified Results  (1) CORTISOL AM SPECIMEN [id number] 07:53AM [facility] [employer]     Test Name Result Flag Reference   Cortisol - AM 6 8 ug/dL  6 2-19 4     (1) COMPREHENSIVE METABOLIC PANEL [id number] 60:48YJ [employer]     Test Name Result Flag Reference   Glucose, Serum 120 mg/dL H 65-99   BUN 27 mg/dL  8-27   Creatinine, Serum 1 40 mg/dL H 0 76-1 27   BUN/Creatinine Ratio 19  10-24   Sodium, Serum 139 mmol/L  134-144   Potassium, Serum 4 0 mmol/L  3 5-5 2   Chloride, Serum 102 mmol/L     Carbon Dioxide, Total 22 mmol/L  18-29   Calcium, Serum 9 0 mg/dL  8 6-10 2   Protein, Total, Serum 6 8 g/dL  6 0-8 5   Albumin, Serum 4 2 g/dL  3 6-4 8   Globulin, Total 2 6 g/dL  1 5-4 5   A/G Ratio 1 6  1 2-2 2   Bilirubin, Total 0 3 mg/dL  0 0-1 2   Alkaline Phosphatase, S 94 IU/L     AST (SGOT) 16 IU/L  0-40   ALT (SGPT) 21 IU/L  0-44   eGFR If NonAfricn Am 52 mL/min/1 73 L >59   eGFR If Africn Am 60 mL/min/1 73  >59     (1) HEMOGLOBIN A1C [id number] 09:28AM [facility]     Test Name Result Flag Reference   Hemoglobin A1c 5 9 % H 4 8-5 6   Pre-diabetes: 5 7 - 6 4           Diabetes: >6 4           Glycemic control for adults with diabetes: <7 0     (1) LIPID PANEL FASTING W DIRECT LDL REFLEX [id number] 09:28AM [facility]     Test Name Result Flag Reference   Cholesterol, Total 257 mg/dL H 100-199   Triglycerides 459 mg/dL H 0-149   HDL Cholesterol 39 mg/dL L >39   LDL Cholesterol Calc Comment mg/dL  0-99   Triglyceride result indicated is too high for an accurate LDL  cholesterol estimation  LDL Chol   (Direct) 151 mg/dL H 0-99

## 2018-01-14 VITALS
SYSTOLIC BLOOD PRESSURE: 120 MMHG | TEMPERATURE: 98 F | WEIGHT: 169 LBS | BODY MASS INDEX: 33.18 KG/M2 | HEIGHT: 60 IN | HEART RATE: 84 BPM | RESPIRATION RATE: 16 BRPM | DIASTOLIC BLOOD PRESSURE: 60 MMHG

## 2018-01-15 NOTE — MISCELLANEOUS
Message   Recorded as Task   Date: 10/26/2017 12:40 AM, Created By: System   Task Name: Verify External Doc   Assigned To: Arley Ramirez   Regarding Patient: Vidal Simpson, Status: Active   Comment:    System - 26 Oct 2017 12:40 AM     To: Nikole Ledbetter    Recipient DirectID: [email or web address]    From:     Sender DirectID: [email or web address]  [email or web address] Freeman - 26 Oct 2017 8:48 AM     TASK REASSIGNED: Previously Assigned To Afia Eduardo - 26 Oct 2017 10:13 AM     TASK REPLIED TO: Previously Assigned To [facility] [place],Team  Dr Carie Huynh, Please verify and close this ER task  I spoke with patient who declined making a fup appt  with me, but he will call back at a later time to make appt  and he has a question on another matter  Patient didn't state what the other matter was  ER visit documented in ER log     Arley Ramirez - 26 Oct 2017 1:39 PM     TASK EDITED  noted        Signatures   Electronically signed by : Larissa Pinto DO; Oct 26 2017  1:39PM EST                       (Author)

## 2018-01-24 VITALS
TEMPERATURE: 97.8 F | BODY MASS INDEX: 31.51 KG/M2 | DIASTOLIC BLOOD PRESSURE: 62 MMHG | SYSTOLIC BLOOD PRESSURE: 106 MMHG | HEIGHT: 60 IN | HEART RATE: 76 BPM | WEIGHT: 160.5 LBS | RESPIRATION RATE: 18 BRPM

## 2018-01-24 VITALS — SYSTOLIC BLOOD PRESSURE: 100 MMHG | DIASTOLIC BLOOD PRESSURE: 50 MMHG

## 2018-01-30 PROBLEM — G43.009 MIGRAINE WITHOUT AURA: Status: ACTIVE | Noted: 2017-11-14

## 2018-01-30 PROBLEM — R73.01 IFG (IMPAIRED FASTING GLUCOSE): Status: ACTIVE | Noted: 2017-07-19

## 2018-01-30 PROBLEM — K44.9 HIATAL HERNIA: Status: ACTIVE | Noted: 2017-12-08

## 2018-01-30 PROBLEM — K64.8 INTERNAL HEMORRHOIDS: Status: ACTIVE | Noted: 2017-01-21

## 2018-01-30 PROBLEM — E66.9 OBESITY (BMI 30-39.9): Status: ACTIVE | Noted: 2017-11-10

## 2018-01-30 LAB
ALBUMIN SERPL-MCNC: 4.3 G/DL (ref 3.6–4.8)
ALBUMIN/GLOB SERPL: 1.9 {RATIO} (ref 1.2–2.2)
ALP SERPL-CCNC: 95 IU/L (ref 39–117)
ALT SERPL-CCNC: 17 IU/L (ref 0–44)
AST SERPL-CCNC: 16 IU/L (ref 0–40)
BILIRUB SERPL-MCNC: 0.2 MG/DL (ref 0–1.2)
BUN SERPL-MCNC: 21 MG/DL (ref 8–27)
BUN/CREAT SERPL: 19 (ref 10–24)
CALCIUM SERPL-MCNC: 9.2 MG/DL (ref 8.6–10.2)
CHLORIDE SERPL-SCNC: 109 MMOL/L (ref 96–106)
CHOLEST SERPL-MCNC: 213 MG/DL (ref 100–199)
CO2 SERPL-SCNC: 22 MMOL/L (ref 18–29)
CREAT SERPL-MCNC: 1.12 MG/DL (ref 0.76–1.27)
GLOBULIN SER-MCNC: 2.3 G/DL (ref 1.5–4.5)
GLUCOSE SERPL-MCNC: 133 MG/DL (ref 65–99)
HBA1C MFR BLD: 5.7 % (ref 4.8–5.6)
HDLC SERPL-MCNC: 49 MG/DL
LDLC SERPL CALC-MCNC: 132 MG/DL (ref 0–99)
MICRODELETION SYND BLD/T FISH: NORMAL
POTASSIUM SERPL-SCNC: 4.5 MMOL/L (ref 3.5–5.2)
PROT SERPL-MCNC: 6.6 G/DL (ref 6–8.5)
PSA SERPL-MCNC: 3.6 NG/ML (ref 0–4)
SL AMB EGFR AFRICAN AMERICAN: 79 ML/MIN/1.73
SL AMB EGFR NON AFRICAN AMERICAN: 68 ML/MIN/1.73
SODIUM SERPL-SCNC: 145 MMOL/L (ref 134–144)
TRIGL SERPL-MCNC: 159 MG/DL (ref 0–149)

## 2018-02-07 ENCOUNTER — OFFICE VISIT (OUTPATIENT)
Dept: FAMILY MEDICINE CLINIC | Facility: CLINIC | Age: 68
End: 2018-02-07
Payer: COMMERCIAL

## 2018-02-07 VITALS
HEART RATE: 78 BPM | DIASTOLIC BLOOD PRESSURE: 84 MMHG | RESPIRATION RATE: 18 BRPM | SYSTOLIC BLOOD PRESSURE: 132 MMHG | TEMPERATURE: 98.2 F | WEIGHT: 159.4 LBS | BODY MASS INDEX: 31.13 KG/M2

## 2018-02-07 DIAGNOSIS — K21.9 GASTROESOPHAGEAL REFLUX DISEASE, ESOPHAGITIS PRESENCE NOT SPECIFIED: ICD-10-CM

## 2018-02-07 DIAGNOSIS — I10 BENIGN ESSENTIAL HYPERTENSION: Primary | ICD-10-CM

## 2018-02-07 DIAGNOSIS — R73.01 IFG (IMPAIRED FASTING GLUCOSE): ICD-10-CM

## 2018-02-07 DIAGNOSIS — G43.009 MIGRAINE WITHOUT AURA AND WITHOUT STATUS MIGRAINOSUS, NOT INTRACTABLE: ICD-10-CM

## 2018-02-07 DIAGNOSIS — E78.1 HIGH TRIGLYCERIDES: ICD-10-CM

## 2018-02-07 DIAGNOSIS — R06.02 SOB (SHORTNESS OF BREATH): ICD-10-CM

## 2018-02-07 DIAGNOSIS — J30.9 ACUTE ALLERGIC RHINITIS, UNSPECIFIED SEASONALITY, UNSPECIFIED TRIGGER: ICD-10-CM

## 2018-02-07 PROCEDURE — 99214 OFFICE O/P EST MOD 30 MIN: CPT | Performed by: FAMILY MEDICINE

## 2018-02-07 RX ORDER — MONTELUKAST SODIUM 10 MG/1
1 TABLET ORAL DAILY
COMMUNITY
Start: 2016-01-11 | End: 2018-02-07 | Stop reason: CLARIF

## 2018-02-07 RX ORDER — PANTOPRAZOLE SODIUM 40 MG/1
TABLET, DELAYED RELEASE ORAL DAILY
COMMUNITY
Start: 2014-04-29 | End: 2018-02-07 | Stop reason: CLARIF

## 2018-02-07 RX ORDER — RAMIPRIL 10 MG/1
10 CAPSULE ORAL DAILY
Qty: 90 CAPSULE | Refills: 3 | Status: SHIPPED | OUTPATIENT
Start: 2018-02-07 | End: 2018-08-15

## 2018-02-07 RX ORDER — MULTIVIT-MIN/IRON/FOLIC ACID/K 18-600-40
CAPSULE ORAL 2 TIMES DAILY
COMMUNITY
Start: 2011-04-18

## 2018-02-07 RX ORDER — CALCIUM CARBONATE/VITAMIN D3 500-10/5ML
1 LIQUID (ML) ORAL
COMMUNITY
Start: 2017-11-14 | End: 2018-04-18 | Stop reason: SDUPTHER

## 2018-02-07 RX ORDER — THIAMINE MONONITRATE (VIT B1) 100 MG
TABLET ORAL DAILY
COMMUNITY
Start: 2015-04-07 | End: 2021-12-09

## 2018-02-07 RX ORDER — RAMIPRIL 10 MG/1
1 CAPSULE ORAL DAILY
COMMUNITY
Start: 2012-06-25 | End: 2018-02-07 | Stop reason: CLARIF

## 2018-02-07 RX ORDER — TRIAMCINOLONE ACETONIDE 55 UG/1
2 SPRAY, METERED NASAL DAILY
COMMUNITY
Start: 2011-12-19 | End: 2019-02-01 | Stop reason: ALTCHOICE

## 2018-02-07 RX ORDER — PROPRANOLOL HYDROCHLORIDE 80 MG/1
80 CAPSULE, EXTENDED RELEASE ORAL DAILY
Qty: 90 CAPSULE | Refills: 3 | Status: SHIPPED | OUTPATIENT
Start: 2018-02-07 | End: 2018-04-18 | Stop reason: SDUPTHER

## 2018-02-07 RX ORDER — PROPRANOLOL HYDROCHLORIDE 80 MG/1
1 CAPSULE, EXTENDED RELEASE ORAL DAILY
COMMUNITY
Start: 2017-11-10 | End: 2018-02-07 | Stop reason: CLARIF

## 2018-02-07 RX ORDER — TRIAMTERENE AND HYDROCHLOROTHIAZIDE 75; 50 MG/1; MG/1
0.5 TABLET ORAL DAILY
COMMUNITY
Start: 2014-11-10 | End: 2018-02-07

## 2018-02-07 RX ORDER — TRAMADOL HYDROCHLORIDE 200 MG/1
1 CAPSULE ORAL
COMMUNITY
Start: 2012-02-20 | End: 2018-02-07 | Stop reason: CLARIF

## 2018-02-07 RX ORDER — MONTELUKAST SODIUM 10 MG/1
10 TABLET ORAL
Qty: 90 TABLET | Refills: 3 | Status: SHIPPED | OUTPATIENT
Start: 2018-02-07 | End: 2019-04-05 | Stop reason: SDUPTHER

## 2018-02-07 NOTE — PROGRESS NOTES
Assessment/Plan:    Diet/exercise/weight loss is recommended   htn stable  Migraines stable  gerd stable  pulm referral at pt and sister request, 2nd hand smoke exposure over lifetime   Diagnoses and all orders for this visit:    Benign essential hypertension  -     propranolol (INDERAL LA) 80 mg 24 hr capsule; Take 1 capsule (80 mg total) by mouth daily  -     ramipril (ALTACE) 10 MG capsule; Take 1 capsule (10 mg total) by mouth daily  -     montelukast (SINGULAIR) 10 mg tablet; Take 1 tablet (10 mg total) by mouth daily with breakfast  -     Comprehensive metabolic panel; Future  -     Hemoglobin A1c; Future  -     Lipid Panel with Direct LDL reflex; Future    Gastroesophageal reflux disease, esophagitis presence not specified  -     propranolol (INDERAL LA) 80 mg 24 hr capsule; Take 1 capsule (80 mg total) by mouth daily  -     ramipril (ALTACE) 10 MG capsule; Take 1 capsule (10 mg total) by mouth daily  -     montelukast (SINGULAIR) 10 mg tablet; Take 1 tablet (10 mg total) by mouth daily with breakfast  -     Comprehensive metabolic panel; Future  -     Hemoglobin A1c; Future  -     Lipid Panel with Direct LDL reflex; Future    IFG (impaired fasting glucose)  -     propranolol (INDERAL LA) 80 mg 24 hr capsule; Take 1 capsule (80 mg total) by mouth daily  -     ramipril (ALTACE) 10 MG capsule; Take 1 capsule (10 mg total) by mouth daily  -     montelukast (SINGULAIR) 10 mg tablet; Take 1 tablet (10 mg total) by mouth daily with breakfast  -     Comprehensive metabolic panel; Future  -     Hemoglobin A1c; Future  -     Lipid Panel with Direct LDL reflex; Future    Acute allergic rhinitis, unspecified seasonality, unspecified trigger    Migraine without aura and without status migrainosus, not intractable    SOB (shortness of breath)  -     Ambulatory referral to Pulmonology; Future    High triglycerides    Other orders  -     Magnesium Oxide 400 MG CAPS;  Take 1 tablet by mouth  -     Multiple Vitamin (MULTI-DAY PO); Take 1 tablet by mouth daily  -     Discontinue: propranolol (INDERAL LA) 80 mg 24 hr capsule; Take 1 capsule by mouth daily  -     Discontinue: TraMADol HCl  MG CP24; Take 1 tablet by mouth  -     Triamcinolone Acetonide 55 MCG/ACT AERO; 2 puffs into each nostril daily  -     thiamine (VITAMIN B1) 100 mg tablet; Take by mouth daily  -     Ascorbic Acid (VITAMIN C) 500 MG CAPS; Take by mouth daily  -     Discontinue: montelukast (SINGULAIR) 10 mg tablet; Take 1 tablet by mouth daily  -     Discontinue: pantoprazole (PROTONIX) 40 mg tablet; Take by mouth daily  -     Discontinue: ramipril (ALTACE) 10 MG capsule; Take 1 capsule by mouth daily  -     Discontinue: triamterene-hydrochlorothiazide (MAXZIDE) 75-50 MG per tablet; Take 0 5 tablets by mouth daily              No Follow-up on file  Subjective:      Patient ID: Mj Lan is a 78 y o  male  Chief Complaint   Patient presents with    Follow-up       htn stable  psa velocity elevated  Not constipated  Eating healthy  Some wt loss  Prediabetic still but improved  Trig much improved  Headaches less but still 4-5 per month, taking mg supplements, sees neurologist        The following portions of the patient's history were reviewed and updated as appropriate: allergies, current medications, past family history, past medical history, past social history, past surgical history and problem list     Review of Systems   Cardiovascular: Negative for chest pain and leg swelling  Neurological: Negative for syncope           Current Outpatient Prescriptions   Medication Sig Dispense Refill    Ascorbic Acid (VITAMIN C) 500 MG CAPS Take by mouth daily      Magnesium Oxide 400 MG CAPS Take 1 tablet by mouth      montelukast (SINGULAIR) 10 mg tablet Take 1 tablet (10 mg total) by mouth daily with breakfast 90 tablet 3    Multiple Vitamin (MULTI-DAY PO) Take 1 tablet by mouth daily      pantoprazole (PROTONIX) 40 mg tablet Take 40 mg by mouth daily  propranolol (INDERAL LA) 80 mg 24 hr capsule Take 1 capsule (80 mg total) by mouth daily 90 capsule 3    ramipril (ALTACE) 10 MG capsule Take 1 capsule (10 mg total) by mouth daily 90 capsule 3    thiamine (VITAMIN B1) 100 mg tablet Take by mouth daily      traMADol (ULTRAM-ER) 200 MG 24 hr tablet Take 200 mg by mouth daily At bedtime      Triamcinolone Acetonide 55 MCG/ACT AERO 2 puffs into each nostril daily      dicyclomine (BENTYL) 20 mg tablet Take 1 tablet by mouth 2 (two) times a day for 5 days 10 tablet 0     No current facility-administered medications for this visit  Objective:    /84   Pulse 78   Temp 98 2 °F (36 8 °C)   Resp 18   Wt 72 3 kg (159 lb 6 4 oz)   BMI 31 13 kg/m²        Physical Exam   Constitutional: He appears well-developed  HENT:   Head: Normocephalic  Eyes: Conjunctivae are normal  Right eye exhibits no discharge  Left eye exhibits no discharge  Cerumen impaction   Neck: Neck supple  Cardiovascular: Normal rate and intact distal pulses  Pulmonary/Chest: Effort normal  No respiratory distress  Abdominal: Soft  Musculoskeletal: He exhibits no edema or deformity  Neurological: He is alert  Skin: Skin is warm and dry  Psychiatric: His behavior is normal  Thought content normal    Nursing note and vitals reviewed               Eulalio Chiu DO

## 2018-04-18 ENCOUNTER — OFFICE VISIT (OUTPATIENT)
Dept: NEUROLOGY | Facility: CLINIC | Age: 68
End: 2018-04-18
Payer: COMMERCIAL

## 2018-04-18 VITALS
HEIGHT: 61 IN | DIASTOLIC BLOOD PRESSURE: 88 MMHG | WEIGHT: 153 LBS | SYSTOLIC BLOOD PRESSURE: 158 MMHG | HEART RATE: 60 BPM | BODY MASS INDEX: 28.89 KG/M2

## 2018-04-18 DIAGNOSIS — R73.01 IFG (IMPAIRED FASTING GLUCOSE): ICD-10-CM

## 2018-04-18 DIAGNOSIS — K21.9 GASTROESOPHAGEAL REFLUX DISEASE, ESOPHAGITIS PRESENCE NOT SPECIFIED: ICD-10-CM

## 2018-04-18 DIAGNOSIS — G43.009 MIGRAINE WITHOUT AURA AND WITHOUT STATUS MIGRAINOSUS, NOT INTRACTABLE: Primary | ICD-10-CM

## 2018-04-18 DIAGNOSIS — I10 ESSENTIAL HYPERTENSION: ICD-10-CM

## 2018-04-18 DIAGNOSIS — I10 BENIGN ESSENTIAL HYPERTENSION: ICD-10-CM

## 2018-04-18 PROCEDURE — 99214 OFFICE O/P EST MOD 30 MIN: CPT | Performed by: PSYCHIATRY & NEUROLOGY

## 2018-04-18 RX ORDER — SENNOSIDES 8.6 MG
650 CAPSULE ORAL AS NEEDED
COMMUNITY

## 2018-04-18 RX ORDER — FLUTICASONE PROPIONATE 50 MCG
1 SPRAY, SUSPENSION (ML) NASAL DAILY
COMMUNITY

## 2018-04-18 RX ORDER — CALCIUM CARBONATE/VITAMIN D3 500-10/5ML
400 LIQUID (ML) ORAL 2 TIMES DAILY
Qty: 60 EACH | Refills: 3 | Status: SHIPPED | OUTPATIENT
Start: 2018-04-18 | End: 2018-12-03 | Stop reason: SDUPTHER

## 2018-04-18 RX ORDER — PROPRANOLOL HYDROCHLORIDE 80 MG/1
80 CAPSULE, EXTENDED RELEASE ORAL DAILY
Qty: 90 CAPSULE | Refills: 0 | Status: SHIPPED | OUTPATIENT
Start: 2018-04-18 | End: 2018-08-15 | Stop reason: SDUPTHER

## 2018-04-18 RX ORDER — OMEGA-3 FATTY ACIDS/FISH OIL 300-1000MG
600 CAPSULE ORAL AS NEEDED
COMMUNITY
End: 2021-09-16 | Stop reason: ALTCHOICE

## 2018-04-18 NOTE — PROGRESS NOTES
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    Hypertension    Plan continue Inderal LA 80 mg once a daily    Increase magnesium sulfate 400 mg twice a day  Patient will taking his blood pressure during the time of the headache and let us know if blood pressure is high during that time  Return to office visit in 4 months  If needed we will increase propranolol at time release capsule 60 mg twice a day  Subjective:    42-year-old male with a history of vascular migraine headache without aura  Patient was last seen in the office on November 17th 2017  Since that time patient have total of 11 headache  In February patient got a headache 4 days in a row and he reported he could not get rid of it from February 20th 2 February 23rd  In March patient of for headache on 9th, 13, 21st and 28th  In month of April patient so far is a 3 headache, on the 4th, 6 and 10th  Most of the headaches are because patient goes to sleep to late  Patient also does not drink enough water  Occasionally it is due to weather changes, occasionally questionable due take his blood pressure or does not have any minutes of taking the blood pressure during that time he has a headache  to eating chocolate  Patient reported most of the headaches are at severity scale 10 most of the time it is located in the back of the head 1 sent or twice only it radiated to the front  Patient described this pain as pounding and throbbing pain  After taking the Advil migraine headache last 20-30 minutes  Only 1 episode was associated with nausea and vomiting but none of the headaches are associated with light and noise sensitivity  Patient blood pressure today was 158/88  Patient does not take his blood pressure during the time of the headache      Pt denies double vision, blurred vision, transient monocular blindness, vertigo, tinnitus, hearing loss, slurred speech, difficulty expressing and understanding, dysphasia, and focal weakness, numbness, imbalance and incoordination  Pt denies bladder and bowel urgency, frequency and incontinence  Pt denies double vision, blurred vision, transient monocular blindness, vertigo, tinnitus, hearing loss, slurred speech, difficulty expressing and understanding, dysphasia, and focal weakness, numbness, imbalance and incoordination  Pt denies bladder and bowel urgency, frequency and incontinence  Past Medical History:   Diagnosis Date    Chest pain 02/01/2006    Chronic pain     lower back    Closed fracture of one rib 07/27/2007    Diabetes mellitus ([facility] [address] )     boarderline diabetes    Hiatal hernia     Hyperlipidemia 01/05/2015    Hypertension     Migraine     Sebaceous cyst 01/11/2016       Family History   Problem Relation Age of Onset    Arthritis Mother     Lung cancer Father     Asthma Brother    ,    Social History     Social History    Marital status: Single     Spouse name: N/A    Number of children: N/A    Years of education: N/A     Occupational History    Not on file       Social History Main Topics    Smoking status: Passive Smoke Exposure - Never Smoker    Smokeless tobacco: Never Used    Alcohol use 2 4 oz/week     4 Glasses of wine per week      Comment: Socil drinker per [employer]    Drug use: No    Sexual activity: Not on file     Other Topics Concern    Not on file     Social History Narrative    No narrative on file       Current Outpatient Prescriptions on File Prior to Visit   Medication Sig Dispense Refill    Ascorbic Acid (VITAMIN C) 500 MG CAPS Take by mouth daily      montelukast (SINGULAIR) 10 mg tablet Take 1 tablet (10 mg total) by mouth daily with breakfast 90 tablet 3    Multiple Vitamin (MULTI-DAY PO) Take 1 tablet by mouth daily      pantoprazole (PROTONIX) 40 mg tablet Take 40 mg by mouth daily      ramipril (ALTACE) 10 MG capsule Take 1 capsule (10 mg total) by mouth daily 90 capsule 3    thiamine (VITAMIN B1) 100 mg tablet Take by mouth daily      traMADol (ULTRAM-ER) 200 MG 24 hr tablet Take 200 mg by mouth daily At bedtime      [DISCONTINUED] Magnesium Oxide 400 MG CAPS Take 1 tablet by mouth      [DISCONTINUED] propranolol (INDERAL LA) 80 mg 24 hr capsule Take 1 capsule (80 mg total) by mouth daily 90 capsule 3    dicyclomine (BENTYL) 20 mg tablet Take 1 tablet by mouth 2 (two) times a day for 5 days 10 tablet 0    Triamcinolone Acetonide 55 MCG/ACT AERO 2 puffs into each nostril daily       No current facility-administered medications on file prior to visit  Objective:    Blood pressure 158/88, pulse 60, height 5' 1" (1 549 m), weight 69 4 kg (153 lb)  Physical Exam   Eyes: EOM are normal  Pupils are equal, round, and reactive to light  Neck: Normal carotid pulses present  Carotid bruit is not present  Neurological: He has normal reflexes  Gait normal    Psychiatric: His speech is normal        Neurological Exam    Mental Status  The patient is and oriented to person, place, time, and situation  His recent and remote memory are normal  He has no visuospatial neglect  His speech is normal  His language is fluent with no aphasia  He follows multi-step commands  He has a normal fund of knowledge  Cranial Nerves    CN II: The patient's visual acuity and visual fields are normal   CN III, IV, VI: The patient's pupils are equally round and reactive to light and ocular movements are normal   CN V: The patient has normal facial sensation  CN VII:  The patient has symmetric facial movement  CN VIII:  The patient's hearing is normal   CN IX, X: The patient has symmetric palate movement and normal gag reflex  CN XI: The patient's shoulder shrug strength is normal   CN XII: The patient's tongue is midline without atrophy or fasciculations  Motor  The patient has normal muscle bulk throughout  His overall muscle tone is normal throughout   His strength is 5/5 in all four extremities except as noted  Sensory  The patient's sensation is normal in all four extremities to light touch, pinprick and proprioception  He has normal cortical sensation    Reflexes  Deep tendon reflexes are 2+ and symmetric in all four extremities with downgoing toes bilaterally  Gait and Coordination  The patient has normal gait and station and normal casual, toe, heel, and tandem gait  He has normal right heel to shin and normal left heel to shin coordination  He has normal right finger to nose and normal left finger to nose coordination  ROS:    Review of Systems   Constitutional: Negative  HENT: Negative  Eyes: Negative  Respiratory: Negative  Cardiovascular: Negative  Gastrointestinal: Negative  Endocrine: Negative  Genitourinary: Negative  Musculoskeletal: Negative  Skin: Negative  Allergic/Immunologic: Negative  Neurological: Negative  Hematological: Negative  Psychiatric/Behavioral: Negative

## 2018-07-20 DIAGNOSIS — M53.9 SPINE DISORDER: Primary | ICD-10-CM

## 2018-07-20 RX ORDER — TRAMADOL HYDROCHLORIDE 200 MG/1
TABLET, EXTENDED RELEASE ORAL
Qty: 90 TABLET | Refills: 1 | Status: SHIPPED | OUTPATIENT
Start: 2018-07-20 | End: 2019-02-01 | Stop reason: SDUPTHER

## 2018-08-10 LAB
ALBUMIN SERPL-MCNC: 4 G/DL (ref 3.6–4.8)
ALBUMIN/GLOB SERPL: 1.8 {RATIO} (ref 1.2–2.2)
ALP SERPL-CCNC: 103 IU/L (ref 39–117)
ALT SERPL-CCNC: 25 IU/L (ref 0–44)
AMBIG ABBREV DEFAULT: NORMAL
AST SERPL-CCNC: 21 IU/L (ref 0–40)
BILIRUB SERPL-MCNC: 0.3 MG/DL (ref 0–1.2)
BUN SERPL-MCNC: 27 MG/DL (ref 8–27)
BUN/CREAT SERPL: 23 (ref 10–24)
CALCIUM SERPL-MCNC: 9.1 MG/DL (ref 8.6–10.2)
CHLORIDE SERPL-SCNC: 104 MMOL/L (ref 96–106)
CHOLEST SERPL-MCNC: 192 MG/DL (ref 100–199)
CO2 SERPL-SCNC: 21 MMOL/L (ref 20–29)
CREAT SERPL-MCNC: 1.18 MG/DL (ref 0.76–1.27)
GLOBULIN SER-MCNC: 2.2 G/DL (ref 1.5–4.5)
GLUCOSE SERPL-MCNC: 112 MG/DL (ref 65–99)
HBA1C MFR BLD: 5.9 % (ref 4.8–5.6)
HDLC SERPL-MCNC: 40 MG/DL
LDLC SERPL CALC-MCNC: 107 MG/DL (ref 0–99)
MICRODELETION SYND BLD/T FISH: NORMAL
POTASSIUM SERPL-SCNC: 4.7 MMOL/L (ref 3.5–5.2)
PROT SERPL-MCNC: 6.2 G/DL (ref 6–8.5)
SL AMB EGFR AFRICAN AMERICAN: 73 ML/MIN/1.73
SL AMB EGFR NON AFRICAN AMERICAN: 63 ML/MIN/1.73
SODIUM SERPL-SCNC: 139 MMOL/L (ref 134–144)
TRIGL SERPL-MCNC: 224 MG/DL (ref 0–149)

## 2018-08-15 ENCOUNTER — OFFICE VISIT (OUTPATIENT)
Dept: FAMILY MEDICINE CLINIC | Facility: CLINIC | Age: 68
End: 2018-08-15
Payer: COMMERCIAL

## 2018-08-15 ENCOUNTER — OFFICE VISIT (OUTPATIENT)
Dept: NEUROLOGY | Facility: CLINIC | Age: 68
End: 2018-08-15
Payer: COMMERCIAL

## 2018-08-15 VITALS
HEIGHT: 61 IN | TEMPERATURE: 97.2 F | RESPIRATION RATE: 16 BRPM | HEART RATE: 60 BPM | WEIGHT: 158 LBS | DIASTOLIC BLOOD PRESSURE: 84 MMHG | SYSTOLIC BLOOD PRESSURE: 136 MMHG | BODY MASS INDEX: 29.83 KG/M2

## 2018-08-15 VITALS
SYSTOLIC BLOOD PRESSURE: 148 MMHG | BODY MASS INDEX: 29.83 KG/M2 | HEART RATE: 58 BPM | DIASTOLIC BLOOD PRESSURE: 92 MMHG | WEIGHT: 158 LBS | HEIGHT: 61 IN

## 2018-08-15 DIAGNOSIS — I10 BENIGN ESSENTIAL HYPERTENSION: Primary | ICD-10-CM

## 2018-08-15 DIAGNOSIS — G43.009 MIGRAINE WITHOUT AURA AND WITHOUT STATUS MIGRAINOSUS, NOT INTRACTABLE: Primary | ICD-10-CM

## 2018-08-15 DIAGNOSIS — I10 BENIGN ESSENTIAL HYPERTENSION: ICD-10-CM

## 2018-08-15 DIAGNOSIS — R73.01 IFG (IMPAIRED FASTING GLUCOSE): ICD-10-CM

## 2018-08-15 DIAGNOSIS — G43.009 MIGRAINE WITHOUT AURA AND WITHOUT STATUS MIGRAINOSUS, NOT INTRACTABLE: ICD-10-CM

## 2018-08-15 DIAGNOSIS — K21.9 GASTROESOPHAGEAL REFLUX DISEASE, ESOPHAGITIS PRESENCE NOT SPECIFIED: ICD-10-CM

## 2018-08-15 DIAGNOSIS — R05.9 COUGH: ICD-10-CM

## 2018-08-15 PROCEDURE — 1101F PT FALLS ASSESS-DOCD LE1/YR: CPT | Performed by: FAMILY MEDICINE

## 2018-08-15 PROCEDURE — 99214 OFFICE O/P EST MOD 30 MIN: CPT | Performed by: FAMILY MEDICINE

## 2018-08-15 PROCEDURE — 99214 OFFICE O/P EST MOD 30 MIN: CPT | Performed by: PSYCHIATRY & NEUROLOGY

## 2018-08-15 RX ORDER — BENZONATATE 100 MG/1
100 CAPSULE ORAL 3 TIMES DAILY PRN
Qty: 60 CAPSULE | Refills: 5 | Status: SHIPPED | OUTPATIENT
Start: 2018-08-15 | End: 2019-03-04

## 2018-08-15 RX ORDER — SUMATRIPTAN 100 MG/1
100 TABLET, FILM COATED ORAL ONCE AS NEEDED
Qty: 9 TABLET | Refills: 1 | Status: SHIPPED | OUTPATIENT
Start: 2018-08-15 | End: 2018-10-26 | Stop reason: SDUPTHER

## 2018-08-15 RX ORDER — PROPRANOLOL HYDROCHLORIDE 120 MG/1
120 CAPSULE, EXTENDED RELEASE ORAL DAILY
Qty: 90 CAPSULE | Refills: 1 | Status: SHIPPED | OUTPATIENT
Start: 2018-08-15 | End: 2018-09-12 | Stop reason: SDUPTHER

## 2018-08-15 NOTE — PROGRESS NOTES
Assessment/Plan:    No problem-specific Assessment & Plan notes found for this encounter  Headaches ongoing  Serotonin syndrome warning given  Try to increase propranolol for headache benefit  Dc ramipril for now  Recheck 1m  Tessalon prn cough helps per pt     Diagnoses and all orders for this visit:    Benign essential hypertension  -     propranolol (INDERAL LA) 120 mg 24 hr capsule; Take 1 capsule (120 mg total) by mouth daily    Gastroesophageal reflux disease, esophagitis presence not specified  -     propranolol (INDERAL LA) 120 mg 24 hr capsule; Take 1 capsule (120 mg total) by mouth daily    Migraine without aura and without status migrainosus, not intractable    IFG (impaired fasting glucose)  -     propranolol (INDERAL LA) 120 mg 24 hr capsule; Take 1 capsule (120 mg total) by mouth daily    Cough  -     benzonatate (TESSALON PERLES) 100 mg capsule; Take 1 capsule (100 mg total) by mouth 3 (three) times a day as needed for cough              Return in about 1 month (around 9/15/2018) for Recheck  Subjective:      Patient ID: Georgia Painter is a 80 y o  male  Chief Complaint   Patient presents with    Follow-up     6 month follow up [facility]       HPI  Eating better  Walking more  Some snacks, healthier  Rice pudding  fbs improved  a1c same   LDL much better from 132 to 107  Headaches 15 in 4m  Now given imitrex  Takes tramadol every night  Serotonin syndrome advised    The following portions of the patient's history were reviewed and updated as appropriate: allergies, current medications, past family history, past medical history, past social history, past surgical history and problem list     Review of Systems   Respiratory: Negative for shortness of breath  Cardiovascular: Negative for chest pain and leg swelling           Current Outpatient Prescriptions   Medication Sig Dispense Refill    acetaminophen (TYLENOL ARTHRITIS PAIN) 650 mg CR tablet Take 650 mg by mouth as needed        Ascorbic Acid (VITAMIN C) 500 MG CAPS Take by mouth daily      fluticasone (FLONASE) 50 mcg/act nasal spray 1 spray into each nostril daily      Magnesium Oxide 400 MG CAPS Take 1 tablet (400 mg total) by mouth 2 (two) times a day 60 each 3    montelukast (SINGULAIR) 10 mg tablet Take 1 tablet (10 mg total) by mouth daily with breakfast 90 tablet 3    Multiple Vitamin (MULTI-DAY PO) Take 1 tablet by mouth daily      pantoprazole (PROTONIX) 40 mg tablet Take 40 mg by mouth daily      propranolol (INDERAL LA) 120 mg 24 hr capsule Take 1 capsule (120 mg total) by mouth daily 90 capsule 1    SUMAtriptan (IMITREX) 100 mg tablet Take 1 tablet (100 mg total) by mouth once as needed for migraine for up to 1 dose 9 tablet 1    thiamine (VITAMIN B1) 100 mg tablet Take by mouth daily      traMADol (ULTRAM-ER) 200 MG 24 hr tablet TAKE ONE TABLET BY MOUTH AT BEDTIME 90 tablet 1    Triamcinolone Acetonide 55 MCG/ACT AERO 2 puffs into each nostril daily      benzonatate (TESSALON PERLES) 100 mg capsule Take 1 capsule (100 mg total) by mouth 3 (three) times a day as needed for cough 60 capsule 5    dicyclomine (BENTYL) 20 mg tablet Take 1 tablet by mouth 2 (two) times a day for 5 days 10 tablet 0    Ibuprofen (ADVIL MIGRAINE) 200 MG CAPS Take 600 mg by mouth as needed       No current facility-administered medications for this visit  Objective:    /84   Pulse 60   Temp (!) 97 2 °F (36 2 °C)   Resp 16   Ht 5' 1" (1 549 m)   Wt 71 7 kg (158 lb)   BMI 29 85 kg/m²        Physical Exam   Constitutional: He appears well-developed  No distress  HENT:   Head: Normocephalic  Mouth/Throat: No oropharyngeal exudate  Eyes: Conjunctivae are normal    Neck: Neck supple  Cardiovascular: Normal rate and intact distal pulses  No murmur heard  Pulmonary/Chest: Effort normal  No respiratory distress  Abdominal: Soft  There is no tenderness  Musculoskeletal: He exhibits no edema or deformity  Neurological: He is alert  He exhibits normal muscle tone  Skin: Skin is warm and dry  Psychiatric: His behavior is normal  Thought content normal    Nursing note and vitals reviewed               Ritchie San DO

## 2018-08-15 NOTE — PROGRESS NOTES
Patient ID: Maricarmen Masterson is a 80 y o  male  Assessment/Plan:     migraine without aura    Hypertension    Plan continue Inderal LA 80 mg daily which is given to him by  Primary physician  I have given patient Imitrex 100 mg tablet to be taken along with the full 1 tablet of aspirin at the onset of the headache and repeat the same if headache does not go away in 2 hours and no more than 2 tablets in 24 hours  Prescription has been sent to his pharmacy today with 1 refill  Patient has been advised to take his blood pressure at the onset of the headache or during sometime headache  Also patient to take is Inderal feet fully on the daily basis  I will see the patient in 3 months            Subjective:    41-year-old male followed in the office for migraine without aura  Patient last visit was on April 18, 2018  Patient had a 3 headaches in month of May and 3 headaches in month of June with a 1 headache being at scale 10 in month of July patient had a total of 7 headache out of which 3 were at severity scale 10  Patient already have 3 headache in month of August which was on the 5th 6th and 7th out of which 1 was at severity scale 10  Patient triggers are change in the sleep pattern, exposure to excessive heat or rainy weather day  I cannot determine whether patient has a fluctuating blood pressure as a trigger for the headache as no blood pressure has been take taken at any days  Patient described all the headaches were located in the back of the head and pain was throbbing quality  He denies ever having any nausea, vomiting or any light and noise sensitivity  For each headache patient takes 3 Excedrin migraine and headache subsides in 30-45 minutes  Pt denies double vision, blurred vision, transient monocular blindness, vertigo, tinnitus, hearing loss, slurred speech, difficulty expressing and understanding, dysphasia, and focal weakness, numbness, imbalance and incoordination   Pt denies bladder and bowel urgency, frequency and incontinence  Pt denies double vision, blurred vision, transient monocular blindness, vertigo, tinnitus, hearing loss, slurred speech, difficulty expressing and understanding, dysphasia, and focal weakness, numbness, imbalance and incoordination  Pt denies bladder and bowel urgency, frequency and incontinence  Past Medical History:   Diagnosis Date    Chest pain 02/01/2006    Chronic pain     lower back    Closed fracture of one rib 07/27/2007    Diabetes mellitus ([facility] [address] )     boarderline diabetes    Hiatal hernia     Hyperlipidemia 01/05/2015    Hypertension     Migraine     Sebaceous cyst 01/11/2016       Family History   Problem Relation Age of Onset    Arthritis Mother     Lung cancer Father     Asthma Brother    ,    Social History     Social History    Marital status: Single     Spouse name: N/A    Number of children: N/A    Years of education: N/A     Occupational History    Not on file       Social History Main Topics    Smoking status: Passive Smoke Exposure - Never Smoker    Smokeless tobacco: Never Used    Alcohol use 2 4 oz/week     4 Glasses of wine per week      Comment: Socil drinker per [employer]    Drug use: No    Sexual activity: Not on file     Other Topics Concern    Not on file     Social History Narrative    No narrative on file       Current Outpatient Prescriptions on File Prior to Visit   Medication Sig Dispense Refill    acetaminophen (TYLENOL ARTHRITIS PAIN) 650 mg CR tablet Take 650 mg by mouth as needed        Ascorbic Acid (VITAMIN C) 500 MG CAPS Take by mouth daily      fluticasone (FLONASE) 50 mcg/act nasal spray 1 spray into each nostril daily      Magnesium Oxide 400 MG CAPS Take 1 tablet (400 mg total) by mouth 2 (two) times a day 60 each 3    montelukast (SINGULAIR) 10 mg tablet Take 1 tablet (10 mg total) by mouth daily with breakfast 90 tablet 3    Multiple Vitamin (MULTI-DAY PO) Take 1 tablet by mouth daily      pantoprazole (PROTONIX) 40 mg tablet Take 40 mg by mouth daily      propranolol (INDERAL LA) 80 mg 24 hr capsule Take 1 capsule (80 mg total) by mouth daily 90 capsule 0    ramipril (ALTACE) 10 MG capsule Take 1 capsule (10 mg total) by mouth daily 90 capsule 3    thiamine (VITAMIN B1) 100 mg tablet Take by mouth daily      traMADol (ULTRAM-ER) 200 MG 24 hr tablet TAKE ONE TABLET BY MOUTH AT BEDTIME 90 tablet 1    dicyclomine (BENTYL) 20 mg tablet Take 1 tablet by mouth 2 (two) times a day for 5 days 10 tablet 0    Ibuprofen (ADVIL MIGRAINE) 200 MG CAPS Take 600 mg by mouth as needed      Triamcinolone Acetonide 55 MCG/ACT AERO 2 puffs into each nostril daily       No current facility-administered medications on file prior to visit  Objective:    Blood pressure 148/92, pulse 58, height 5' 1" (1 549 m), weight 71 7 kg (158 lb)  Physical Exam   Eyes: EOM are normal  Pupils are equal, round, and reactive to light  Neck: Normal carotid pulses present  Carotid bruit is not present  Neurological: He has normal reflexes  Gait normal    Psychiatric: His speech is normal        Neurological Exam    Mental Status  The patient is and oriented to person, place, time, and situation  His recent and remote memory are normal  He has no visuospatial neglect  His speech is normal  His language is fluent with no aphasia  He follows multi-step commands  He has a normal fund of knowledge  Cranial Nerves    CN II: The patient's visual acuity and visual fields are normal   CN III, IV, VI: The patient's pupils are equally round and reactive to light and ocular movements are normal   CN V: The patient has normal facial sensation  CN VII:  The patient has symmetric facial movement  CN VIII:  The patient's hearing is normal   CN IX, X: The patient has symmetric palate movement and normal gag reflex    CN XI: The patient's shoulder shrug strength is normal   CN XII: The patient's tongue is midline without atrophy or fasciculations  Motor  The patient has normal muscle bulk throughout  His overall muscle tone is normal throughout  His strength is 5/5 in all four extremities except as noted  Sensory  The patient's sensation is normal in all four extremities to light touch, pinprick and proprioception  He has normal cortical sensation    Reflexes  Deep tendon reflexes are 2+ and symmetric in all four extremities with downgoing toes bilaterally  Gait and Coordination  The patient has normal gait and station and normal casual, toe, heel, and tandem gait  He has normal right heel to shin and normal left heel to shin coordination  He has normal right finger to nose and normal left finger to nose coordination  ROS:    Review of Systems   Constitutional: Negative  HENT: Negative  Eyes: Negative  Respiratory: Negative  Cardiovascular: Negative  Gastrointestinal: Negative  Endocrine: Negative  Genitourinary: Negative  Musculoskeletal: Negative  Skin: Negative  Allergic/Immunologic: Negative  Neurological: Negative  Hematological: Negative  Psychiatric/Behavioral: Negative

## 2018-08-15 NOTE — PATIENT INSTRUCTIONS
For better headache control, we can increase the propranolol from 80mg/d to 120mg/d  At the same time, we can stop the ramipril  Please monitor for symptoms of "serotonin syndrome" when taking imitrex

## 2018-09-12 ENCOUNTER — OFFICE VISIT (OUTPATIENT)
Dept: FAMILY MEDICINE CLINIC | Facility: CLINIC | Age: 68
End: 2018-09-12
Payer: COMMERCIAL

## 2018-09-12 VITALS
HEART RATE: 64 BPM | SYSTOLIC BLOOD PRESSURE: 158 MMHG | DIASTOLIC BLOOD PRESSURE: 64 MMHG | WEIGHT: 161.8 LBS | RESPIRATION RATE: 16 BRPM | TEMPERATURE: 97.4 F | HEIGHT: 61 IN | BODY MASS INDEX: 30.55 KG/M2

## 2018-09-12 DIAGNOSIS — I10 BENIGN ESSENTIAL HYPERTENSION: Primary | ICD-10-CM

## 2018-09-12 DIAGNOSIS — K21.9 GASTROESOPHAGEAL REFLUX DISEASE, ESOPHAGITIS PRESENCE NOT SPECIFIED: ICD-10-CM

## 2018-09-12 DIAGNOSIS — L03.119 CELLULITIS OF UPPER EXTREMITY, UNSPECIFIED LATERALITY: ICD-10-CM

## 2018-09-12 DIAGNOSIS — R73.01 IFG (IMPAIRED FASTING GLUCOSE): ICD-10-CM

## 2018-09-12 DIAGNOSIS — G43.009 MIGRAINE WITHOUT AURA AND WITHOUT STATUS MIGRAINOSUS, NOT INTRACTABLE: ICD-10-CM

## 2018-09-12 PROCEDURE — 3008F BODY MASS INDEX DOCD: CPT | Performed by: FAMILY MEDICINE

## 2018-09-12 PROCEDURE — 3725F SCREEN DEPRESSION PERFORMED: CPT | Performed by: FAMILY MEDICINE

## 2018-09-12 PROCEDURE — 99214 OFFICE O/P EST MOD 30 MIN: CPT | Performed by: FAMILY MEDICINE

## 2018-09-12 RX ORDER — CEPHALEXIN 500 MG/1
500 CAPSULE ORAL 3 TIMES DAILY
Qty: 30 CAPSULE | Refills: 0 | Status: SHIPPED | OUTPATIENT
Start: 2018-09-12 | End: 2018-09-22

## 2018-09-12 RX ORDER — PROPRANOLOL HYDROCHLORIDE 160 MG/1
160 CAPSULE, EXTENDED RELEASE ORAL DAILY
Qty: 30 CAPSULE | Refills: 1 | Status: SHIPPED | OUTPATIENT
Start: 2018-09-12 | End: 2018-10-12 | Stop reason: SDUPTHER

## 2018-09-12 NOTE — PATIENT INSTRUCTIONS
Please increase the inderal from 120mg/d to 160mg/d and do not restart the ramipril  If his blood pressure gets too low (SBP <110) or pulse goes less than 55 AND he feels dizzy, we might have to go back to 120mg and restart the ramipril 10mg/d  We will recheck him and headaches and BP in one month

## 2018-09-12 NOTE — PROGRESS NOTES
Assessment/Plan:    No problem-specific Assessment & Plan notes found for this encounter  Headaches ongoing, some high bp  Has been off ramipril with increase inderal  Suggest increase inderal further to control headaches and control bp but watch pulse, if not tolerated, may need to go back to the ramipril, sister and pt agreeable  GERD stable  IFG advised  Left arm cellulitis new     Diagnoses and all orders for this visit:    Benign essential hypertension  -     propranolol (INDERAL LA) 160 mg; Take 1 capsule (160 mg total) by mouth daily    Migraine without aura and without status migrainosus, not intractable    Gastroesophageal reflux disease, esophagitis presence not specified  -     propranolol (INDERAL LA) 160 mg; Take 1 capsule (160 mg total) by mouth daily    IFG (impaired fasting glucose)  -     propranolol (INDERAL LA) 160 mg; Take 1 capsule (160 mg total) by mouth daily    Cellulitis of upper extremity, unspecified laterality              No Follow-up on file  Subjective:      Patient ID: Valdemar Krishnan is a 80 y o  male  Chief Complaint   Patient presents with    Blood Pressure Check     prcma       HPI  Saw neuro  4 headaches in past month  bp running high, 190/97, later 160/99 then 189/93  [place] bp high at [place] per sister  No dizziness or falls  gerd is stable per pt  Tramadol and imitrex warning reminded    The following portions of the patient's history were reviewed and updated as appropriate: allergies, current medications, past family history, past medical history, past social history, past surgical history and problem list     Review of Systems   Neurological: Positive for headaches  Negative for seizures and syncope           Current Outpatient Prescriptions   Medication Sig Dispense Refill    acetaminophen (TYLENOL ARTHRITIS PAIN) 650 mg CR tablet Take 650 mg by mouth as needed        Ascorbic Acid (VITAMIN C) 500 MG CAPS Take by mouth daily      benzonatate (TESSALON PERLES) 100 mg capsule Take 1 capsule (100 mg total) by mouth 3 (three) times a day as needed for cough 60 capsule 5    fluticasone (FLONASE) 50 mcg/act nasal spray 1 spray into each nostril daily      Ibuprofen (ADVIL MIGRAINE) 200 MG CAPS Take 600 mg by mouth as needed      Magnesium Oxide 400 MG CAPS Take 1 tablet (400 mg total) by mouth 2 (two) times a day 60 each 3    montelukast (SINGULAIR) 10 mg tablet Take 1 tablet (10 mg total) by mouth daily with breakfast 90 tablet 3    Multiple Vitamin (MULTI-DAY PO) Take 1 tablet by mouth daily      pantoprazole (PROTONIX) 40 mg tablet Take 40 mg by mouth daily      propranolol (INDERAL LA) 160 mg Take 1 capsule (160 mg total) by mouth daily 30 capsule 1    SUMAtriptan (IMITREX) 100 mg tablet Take 1 tablet (100 mg total) by mouth once as needed for migraine for up to 1 dose 9 tablet 1    thiamine (VITAMIN B1) 100 mg tablet Take by mouth daily      traMADol (ULTRAM-ER) 200 MG 24 hr tablet TAKE ONE TABLET BY MOUTH AT BEDTIME 90 tablet 1    Triamcinolone Acetonide 55 MCG/ACT AERO 2 puffs into each nostril daily      dicyclomine (BENTYL) 20 mg tablet Take 1 tablet by mouth 2 (two) times a day for 5 days 10 tablet 0     No current facility-administered medications for this visit  Objective:    /64   Pulse 64   Temp (!) 97 4 °F (36 3 °C)   Resp 16   Ht 5' 1" (1 549 m)   Wt 73 4 kg (161 lb 12 8 oz)   BMI 30 57 kg/m²        Physical Exam   Constitutional: He appears well-developed and well-nourished  HENT:   Head: Normocephalic  Mouth/Throat: No oropharyngeal exudate  Eyes: Conjunctivae are normal  Right eye exhibits no discharge  Left eye exhibits no discharge  No scleral icterus  Neck: Neck supple  Carotid bruit is not present  No thyromegaly present  Cardiovascular: Normal rate and intact distal pulses  No murmur heard  Pulmonary/Chest: Effort normal  No respiratory distress  He has no wheezes  Abdominal: Soft   He exhibits no distension  There is no tenderness  There is no rebound  Musculoskeletal: He exhibits no edema or deformity  Lymphadenopathy:     He has no cervical adenopathy  Neurological: He is alert  Coordination normal    Skin: Skin is warm and dry  No rash noted  No pallor  Psychiatric: His behavior is normal  Thought content normal    Nursing note and vitals reviewed               Lamar Johnson DO

## 2018-10-12 ENCOUNTER — OFFICE VISIT (OUTPATIENT)
Dept: FAMILY MEDICINE CLINIC | Facility: CLINIC | Age: 68
End: 2018-10-12
Payer: COMMERCIAL

## 2018-10-12 VITALS
SYSTOLIC BLOOD PRESSURE: 128 MMHG | HEIGHT: 61 IN | DIASTOLIC BLOOD PRESSURE: 76 MMHG | HEART RATE: 56 BPM | BODY MASS INDEX: 30.58 KG/M2 | TEMPERATURE: 97.3 F | WEIGHT: 162 LBS | RESPIRATION RATE: 16 BRPM

## 2018-10-12 DIAGNOSIS — J30.9 ALLERGIC RHINITIS, UNSPECIFIED SEASONALITY, UNSPECIFIED TRIGGER: ICD-10-CM

## 2018-10-12 DIAGNOSIS — Z00.00 MEDICARE ANNUAL WELLNESS VISIT, SUBSEQUENT: Primary | ICD-10-CM

## 2018-10-12 DIAGNOSIS — I10 BENIGN ESSENTIAL HYPERTENSION: ICD-10-CM

## 2018-10-12 DIAGNOSIS — E66.9 OBESITY (BMI 30-39.9): ICD-10-CM

## 2018-10-12 DIAGNOSIS — H61.23 BILATERAL IMPACTED CERUMEN: ICD-10-CM

## 2018-10-12 DIAGNOSIS — K21.9 GASTROESOPHAGEAL REFLUX DISEASE, ESOPHAGITIS PRESENCE NOT SPECIFIED: ICD-10-CM

## 2018-10-12 DIAGNOSIS — G43.009 MIGRAINE WITHOUT AURA AND WITHOUT STATUS MIGRAINOSUS, NOT INTRACTABLE: ICD-10-CM

## 2018-10-12 PROCEDURE — 99214 OFFICE O/P EST MOD 30 MIN: CPT | Performed by: FAMILY MEDICINE

## 2018-10-12 PROCEDURE — 1170F FXNL STATUS ASSESSED: CPT | Performed by: FAMILY MEDICINE

## 2018-10-12 PROCEDURE — 3074F SYST BP LT 130 MM HG: CPT | Performed by: FAMILY MEDICINE

## 2018-10-12 PROCEDURE — G0439 PPPS, SUBSEQ VISIT: HCPCS | Performed by: FAMILY MEDICINE

## 2018-10-12 PROCEDURE — 1125F AMNT PAIN NOTED PAIN PRSNT: CPT | Performed by: FAMILY MEDICINE

## 2018-10-12 PROCEDURE — 3078F DIAST BP <80 MM HG: CPT | Performed by: FAMILY MEDICINE

## 2018-10-12 RX ORDER — PROPRANOLOL HYDROCHLORIDE 160 MG/1
160 CAPSULE, EXTENDED RELEASE ORAL DAILY
Qty: 90 CAPSULE | Refills: 1 | Status: SHIPPED | OUTPATIENT
Start: 2018-10-12 | End: 2019-04-15 | Stop reason: SDUPTHER

## 2018-10-12 NOTE — PROGRESS NOTES
Assessment/Plan:    No problem-specific Assessment & Plan notes found for this encounter     htn is stable  Less headaches than last month  Cont BB and altace    B/l cerumen  Has been getting cleaned per sister every 3m at other ENT  Looking for a new ENT    Allergies stable, cont meds    GERD stable  Work on weight loss and exercise suggested  Recent data suggesting increased risk of ischemic CVA and chronic kidney damage with PPI use was advised  Diagnoses and all orders for this visit:    Medicare annual wellness visit, subsequent    Benign essential hypertension  -     propranolol (INDERAL LA) 160 mg; Take 1 capsule (160 mg total) by mouth daily    Gastroesophageal reflux disease, esophagitis presence not specified    Bilateral impacted cerumen  -     Ambulatory Referral to Otolaryngology; Future    Migraine without aura and without status migrainosus, not intractable  -     propranolol (INDERAL LA) 160 mg; Take 1 capsule (160 mg total) by mouth daily    Allergic rhinitis, unspecified seasonality, unspecified trigger    Obesity (BMI 30-39  9)    BMI 30 0-30 9,adult              Return in about 6 months (around 4/12/2019) for Recheck  Subjective:      Patient ID: Julián Martinez is a 80 y o  male  Chief Complaint   Patient presents with    Follow-up     prcma    Hypertension       HPI  Pulse/bp better on inderal  Headache x 2 since last month  No fatigue or cp  bp at home with ha 150s  Pulse 76 with headache    B/l wax despite recent cleanings, sister wants second opinion    Allergies stable  Takes his meds   Worse outdoors  No sob or wheeze    GERD stable  No n/v/abd pain    The following portions of the patient's history were reviewed and updated as appropriate: allergies, current medications, past family history, past medical history, past social history, past surgical history and problem list     Review of Systems   Respiratory: Negative for shortness of breath and wheezing      Cardiovascular: Negative for chest pain  Gastrointestinal: Negative for abdominal pain and bowel incontinence  Psychiatric/Behavioral: The patient is not nervous/anxious  Current Outpatient Prescriptions   Medication Sig Dispense Refill    acetaminophen (TYLENOL ARTHRITIS PAIN) 650 mg CR tablet Take 650 mg by mouth as needed        Ascorbic Acid (VITAMIN C) 500 MG CAPS Take by mouth daily      benzonatate (TESSALON PERLES) 100 mg capsule Take 1 capsule (100 mg total) by mouth 3 (three) times a day as needed for cough 60 capsule 5    fluticasone (FLONASE) 50 mcg/act nasal spray 1 spray into each nostril daily      Ibuprofen (ADVIL MIGRAINE) 200 MG CAPS Take 600 mg by mouth as needed      Magnesium Oxide 400 MG CAPS Take 1 tablet (400 mg total) by mouth 2 (two) times a day 60 each 3    montelukast (SINGULAIR) 10 mg tablet Take 1 tablet (10 mg total) by mouth daily with breakfast 90 tablet 3    Multiple Vitamin (MULTI-DAY PO) Take 1 tablet by mouth daily      pantoprazole (PROTONIX) 40 mg tablet Take 40 mg by mouth daily      propranolol (INDERAL LA) 160 mg Take 1 capsule (160 mg total) by mouth daily 90 capsule 1    SUMAtriptan (IMITREX) 100 mg tablet Take 1 tablet (100 mg total) by mouth once as needed for migraine for up to 1 dose 9 tablet 1    thiamine (VITAMIN B1) 100 mg tablet Take by mouth daily      traMADol (ULTRAM-ER) 200 MG 24 hr tablet TAKE ONE TABLET BY MOUTH AT BEDTIME 90 tablet 1    Triamcinolone Acetonide 55 MCG/ACT AERO 2 puffs into each nostril daily      dicyclomine (BENTYL) 20 mg tablet Take 1 tablet by mouth 2 (two) times a day for 5 days 10 tablet 0     No current facility-administered medications for this visit  Objective:    /76   Pulse 56   Temp (!) 97 3 °F (36 3 °C)   Resp 16   Ht 5' 1" (1 549 m)   Wt 73 5 kg (162 lb)   BMI 30 61 kg/m²        Physical Exam   Constitutional: He appears well-developed  HENT:   Head: Normocephalic     Mouth/Throat: No oropharyngeal exudate  Eyes: Conjunctivae are normal  No scleral icterus  Neck: Neck supple  No thyromegaly present  Cardiovascular: Normal rate and intact distal pulses  No murmur heard  Pulmonary/Chest: Effort normal  No respiratory distress  He has no wheezes  Abdominal: Soft  There is no tenderness  Musculoskeletal: He exhibits no edema or deformity  Lymphadenopathy:     He has no cervical adenopathy  Neurological: He is alert  He exhibits normal muscle tone  Skin: Skin is warm and dry  No rash noted  No pallor  Psychiatric: His behavior is normal  Thought content normal    Nursing note and vitals reviewed  B/l cerumen impaction, TMS not visualized b/l       Mikel Marley DO  Assessment and Plan:    Problem List Items Addressed This Visit     Allergic rhinitis    Benign essential hypertension    Relevant Medications    propranolol (INDERAL LA) 160 mg    Bilateral impacted cerumen    Relevant Orders    Ambulatory Referral to Otolaryngology    BMI 30 0-30 9,adult    GE reflux    Migraine without aura    Relevant Medications    propranolol (INDERAL LA) 160 mg    Obesity (BMI 30-39  9)      Other Visit Diagnoses     Medicare annual wellness visit, subsequent    -  Primary        Health Maintenance Due   Topic Date Due    DTaP,Tdap,and Td Vaccines (1 - Tdap) 02[birth year]/1972    Pneumococcal PPSV23/PCV13 65+ Years / Low and Medium Risk (1 of 2 - PCV13) 02/11/2016    INFLUENZA VACCINE  07/01/2018         HPI:  Mendez Aguayo is a 80 y o  male here for his Subsequent Wellness Visit      Patient Active Problem List   Diagnosis    Allergic rhinitis    Benign colon polyp    Benign essential hypertension    Benign prostatic hyperplasia    Cervical radiculopathy    Diverticulosis of large intestine without perforation or abscess without bleeding    GE reflux    Hiatal hernia    High triglycerides    Hyperlipidemia LDL goal <130    IFG (impaired fasting glucose)    Inguinal hernia    Internal hemorrhoids    Migraine without aura    Obesity (BMI 30-39  9)    Epidermoid cyst of skin    Situational anxiety    Spine disorder    Cellulitis of upper extremity    Bilateral impacted cerumen    BMI 30 0-30 9,adult     Past Medical History:   Diagnosis Date    Chest pain 02/01/2006    Chronic pain     lower back    Closed fracture of one rib 07/27/2007    Diabetes mellitus ([address] )     boarderline diabetes    Hiatal hernia     Hyperlipidemia 01/05/2015    Hypertension     Migraine     Sebaceous cyst 01/11/2016     Past Surgical History:   Procedure Laterality Date    COLONOSCOPY       Family History   Problem Relation Age of Onset    Arthritis Mother     Lung cancer Father     Asthma Brother      History   Smoking Status    Passive Smoke Exposure - Never Smoker   Smokeless Tobacco    Never Used     History   Alcohol Use    2 4 oz/week    4 Glasses of wine per week     Comment: Socil drinker per [employer]      History   Drug Use No       Current Outpatient Prescriptions   Medication Sig Dispense Refill    acetaminophen (TYLENOL ARTHRITIS PAIN) 650 mg CR tablet Take 650 mg by mouth as needed        Ascorbic Acid (VITAMIN C) 500 MG CAPS Take by mouth daily      benzonatate (TESSALON PERLES) 100 mg capsule Take 1 capsule (100 mg total) by mouth 3 (three) times a day as needed for cough 60 capsule 5    fluticasone (FLONASE) 50 mcg/act nasal spray 1 spray into each nostril daily      Ibuprofen (ADVIL MIGRAINE) 200 MG CAPS Take 600 mg by mouth as needed      Magnesium Oxide 400 MG CAPS Take 1 tablet (400 mg total) by mouth 2 (two) times a day 60 each 3    montelukast (SINGULAIR) 10 mg tablet Take 1 tablet (10 mg total) by mouth daily with breakfast 90 tablet 3    Multiple Vitamin (MULTI-DAY PO) Take 1 tablet by mouth daily      pantoprazole (PROTONIX) 40 mg tablet Take 40 mg by mouth daily      propranolol (INDERAL LA) 160 mg Take 1 capsule (160 mg total) by mouth daily 90 capsule 1    SUMAtriptan (IMITREX) 100 mg tablet Take 1 tablet (100 mg total) by mouth once as needed for migraine for up to 1 dose 9 tablet 1    thiamine (VITAMIN B1) 100 mg tablet Take by mouth daily      traMADol (ULTRAM-ER) 200 MG 24 hr tablet TAKE ONE TABLET BY MOUTH AT BEDTIME 90 tablet 1    Triamcinolone Acetonide 55 MCG/ACT AERO 2 puffs into each nostril daily      dicyclomine (BENTYL) 20 mg tablet Take 1 tablet by mouth 2 (two) times a day for 5 days 10 tablet 0     No current facility-administered medications for this visit  No Known Allergies    There is no immunization history on file for this patient  Patient Care Team:  Sofie Szymanski, DO as PCP - MD Annemarie Kincaid MD Wess Falling, MD Twila Quint, MD Estrellita Potts, DO    Medicare Screening Tests and Risk Assessments:  Last Medicare Wellness visit information reviewed, patient interviewed and updates made to the record today  Health Risk Assessment:  Patient rates overall health as very good  Patient feels that their physical health rating is Same  Eyesight was rated as Same  Hearing was rated as Slightly worse  Patient feels that their emotional and mental health rating is Same  Pain experienced by patient in the last 7 days has been Some  Patient's pain rating has been 10/10  Patient states that he has experienced weight loss or gain in last 6 months  (Additional comments: Up/down per sister)    Emotional/Mental Health:  Patient has been feeling nervous/anxious  PHQ-9 Depression Screening:    Frequency of the following problems over the past two weeks:      1  Little interest or pleasure in doing things: 0 - not at all      2  Feeling down, depressed, or hopeless: 0 - not at all  PHQ-2 Score: 0          Broken Bones/Falls: Fall Risk Assessment:    In the past year, patient has experienced: No history of falling in past year          Bladder/Bowel:  Patient has not leaked urine accidently in the last six months  Patient reports no loss of bowel control  Immunizations:  Patient has not had a flu vaccination within the last year  Patient has not received a pneumonia shot  Patient has not received a shingles shot  Patient has received tetanus/diphtheria shot  Home Safety:  Patient does not have trouble with stairs inside or outside of their home  Patient currently reports that there are safety hazards present in home , working smoke alarms, working carbon monoxide detectors  (Additional Comments: Loose rugs, aware)    Preventative Screenings:   no prostate cancer screen performed, colon cancer screen completed, cholesterol screen completed, glaucoma eye exam completed,     Nutrition:  Current diet: Regular, Limited junk food, Low Carb and No Added Salt with servings of the following:    Medications:  Patient is not currently taking any over-the-counter supplements  Patient is able to manage medications  Lifestyle Choices:  Patient reports no tobacco use  Patient has not smoked or used tobacco in the past   Patient reports no alcohol use  Patient does not drive a vehicle  Patient wears seat belt  Current level of exercise of physical activity described by patient as: walking  Activities of Daily Living:  Can get out of bed by his or her self, able to dress self, able to make own meals, able to do own shopping, able to bathe self, can do own laundry/housekeeping, unable to manage own money and other related tasks    Advanced Directives:  Patient has decided on a power of   Patient has spoken to designated power of   Patient has completed advanced directive          Preventative Screening/Counseling:      Cardiovascular:      General: Risks and Benefits Discussed          Diabetes:      General: Screening Current          Colorectal Cancer:      General: Risks and Benefits Discussed and Screening Current          Prostate Cancer:      General: Risks and Benefits Discussed and Screening Current          Osteoporosis:      General: Screening Not Indicated          AAA:      General: Screening Not Indicated          Glaucoma:      General: Risks and Benefits Discussed          HIV:      General: Screening Not Indicated          Hepatitis C:      General: Risks and Benefits Discussed        Advanced Directives:   Patient has no living will for healthcare, does not have durable POA for healthcare, patient does not have an advanced directive  Information on ACP and/or AD provided  No 5 wishes given  No end of life assessment reviewed with patient  Immunizations:      Influenza: Patient Declines      Pneumococcal: Patient Declines      Shingrix: Patient Declines      Other Preventative Counseling (Non-Medicare):   Fall Prevention and Nutrition Counseling      Referrals:  Referral(s) to: Otolaryngology

## 2018-10-26 DIAGNOSIS — G43.009 MIGRAINE WITHOUT AURA AND WITHOUT STATUS MIGRAINOSUS, NOT INTRACTABLE: ICD-10-CM

## 2018-10-26 RX ORDER — SUMATRIPTAN 100 MG/1
100 TABLET, FILM COATED ORAL ONCE AS NEEDED
Qty: 9 TABLET | Refills: 0 | Status: SHIPPED | OUTPATIENT
Start: 2018-10-26 | End: 2018-11-14 | Stop reason: SDUPTHER

## 2018-11-13 ENCOUNTER — OFFICE VISIT (OUTPATIENT)
Dept: OTOLARYNGOLOGY | Facility: CLINIC | Age: 68
End: 2018-11-13
Payer: COMMERCIAL

## 2018-11-13 VITALS
WEIGHT: 165 LBS | DIASTOLIC BLOOD PRESSURE: 90 MMHG | SYSTOLIC BLOOD PRESSURE: 142 MMHG | BODY MASS INDEX: 31.15 KG/M2 | HEIGHT: 61 IN

## 2018-11-13 DIAGNOSIS — H60.543 ACUTE ECZEMATOID OTITIS EXTERNA, BILATERAL: ICD-10-CM

## 2018-11-13 DIAGNOSIS — H61.23 BILATERAL IMPACTED CERUMEN: Primary | ICD-10-CM

## 2018-11-13 PROCEDURE — 99203 OFFICE O/P NEW LOW 30 MIN: CPT | Performed by: SPECIALIST

## 2018-11-13 NOTE — PROGRESS NOTES
Assessment/Plan:    Bilateral impacted cerumen  Bilateral cerumen impaction removed with suction  Tolerated well  Follow up in 6 months    Acute eczematoid otitis externa, bilateral  Bilateral valentin bowl with erythema and dry flaking skin  Also noted posterior to bilateral auricle  Recommend use of hydrocortisone cream to bilateral ears 2 to 3 times per week for itching and dermatitis  Follow up prn worsening  Diagnoses and all orders for this visit:    Bilateral impacted cerumen  -     Ear cerumen removal    Acute eczematoid otitis externa, bilateral          Subjective:      Patient ID: Helen Fong is a 80 y o  male  Mr Alexandra Monzon presents today as a new patient due to bilateral hearing loss with blocked ears  Prior ENT care with Dr Luc Carbone for cerumen removal   Wears bilateral hearing aids for several years  Current set about 1years old  Follows [facility] for hearing aid care  No otalgia or otorrhea  Ears itch at times  Using oil based drops to ears prn blocked feeling  The following portions of the patient's history were reviewed and updated as appropriate: allergies, current medications, past family history, past medical history, past social history, past surgical history and problem list     Review of Systems   Constitutional: Negative  HENT: Positive for hearing loss  Negative for congestion, ear discharge, ear pain, nosebleeds, postnasal drip, rhinorrhea, sinus pain, sinus pressure, sore throat, tinnitus and voice change  Eyes: Negative  Respiratory: Negative for chest tightness and shortness of breath  Cardiovascular: Negative  Gastrointestinal: Negative  Endocrine: Negative  Musculoskeletal: Negative  Skin: Negative for color change  Neurological: Negative for dizziness, numbness and headaches  Psychiatric/Behavioral: Negative            Objective:      /90 (BP Location: Right arm, Patient Position: Sitting, Cuff Size: Adult)   Ht 5' 1" (1 549 m)   Wt 74 8 kg (165 lb)   BMI 31 18 kg/m²          Physical Exam   Constitutional: He is oriented to person, place, and time  He appears well-developed and well-nourished  He is cooperative  HENT:   Head: Normocephalic  Right Ear: Tympanic membrane, external ear and ear canal normal  No drainage or tenderness  Tympanic membrane is not perforated and not erythematous  Decreased hearing is noted  Left Ear: Tympanic membrane, external ear and ear canal normal  No drainage or tenderness  Tympanic membrane is not perforated and not erythematous  Decreased hearing is noted  Nose: Nose normal  No sinus tenderness, nasal deformity or septal deviation  Mouth/Throat: Uvula is midline, oropharynx is clear and moist and mucous membranes are normal  Mucous membranes are not pale and not dry  No oral lesions  Normal dentition  No oropharyngeal exudate  Bilateral cerumen impaction  Bilateral dermatitis in valentin bowl   Neck: Normal range of motion and full passive range of motion without pain  Neck supple  No tracheal deviation present  Cardiovascular: Normal rate  Pulmonary/Chest: Effort normal  No accessory muscle usage  No respiratory distress  Musculoskeletal:        Right shoulder: He exhibits normal range of motion  Lymphadenopathy:     He has no cervical adenopathy  Neurological: He is alert and oriented to person, place, and time  No cranial nerve deficit or sensory deficit  Skin: Skin is warm, dry and intact  Psychiatric: He has a normal mood and affect

## 2018-11-13 NOTE — ASSESSMENT & PLAN NOTE
Bilateral valentin bowl with erythema and dry flaking skin  Also noted posterior to bilateral auricle  Recommend use of hydrocortisone cream to bilateral ears 2 to 3 times per week for itching and dermatitis  Follow up prn worsening

## 2018-11-13 NOTE — PROGRESS NOTES
Ear cerumen removal  Date/Time: 11/13/2018 2:02 PM  Performed by: Latanya Britton  Authorized by: Sterling GRAHAM     Procedure details:     Location:  L ear, R ear and external auditory canal    Approach:  External    Equipment used:  Suction  Post-procedure details:     Complication:  None    Hearing quality:  Normal    Patient tolerance of procedure: Tolerated well, no immediate complications  Comments: On exam noted bilateral cerumen impaction and unable to fully view tympanic membrane  Cerumen impaction removed bilateral eac with suction, pt tolerated procedure well  Upon removal, improved hearing and decreased clogged sensation of bilateral ears  Discussed routine cerumen care including avoidance of q-tips and cerumen softeners  Recommend use of oil based drops to ears prior to next visit  Encourage ongoing follow up in 6 months to monitor for cerumen and hearing  Audiogram if symptoms worsen

## 2018-11-14 ENCOUNTER — OFFICE VISIT (OUTPATIENT)
Dept: NEUROLOGY | Facility: CLINIC | Age: 68
End: 2018-11-14
Payer: COMMERCIAL

## 2018-11-14 VITALS
DIASTOLIC BLOOD PRESSURE: 98 MMHG | HEIGHT: 61 IN | HEART RATE: 52 BPM | SYSTOLIC BLOOD PRESSURE: 158 MMHG | BODY MASS INDEX: 30.96 KG/M2 | WEIGHT: 164 LBS

## 2018-11-14 DIAGNOSIS — G43.009 MIGRAINE WITHOUT AURA AND WITHOUT STATUS MIGRAINOSUS, NOT INTRACTABLE: Primary | ICD-10-CM

## 2018-11-14 DIAGNOSIS — I10 BENIGN ESSENTIAL HYPERTENSION: ICD-10-CM

## 2018-11-14 PROCEDURE — 99214 OFFICE O/P EST MOD 30 MIN: CPT | Performed by: PSYCHIATRY & NEUROLOGY

## 2018-11-14 RX ORDER — SUMATRIPTAN 100 MG/1
100 TABLET, FILM COATED ORAL ONCE AS NEEDED
Qty: 9 TABLET | Refills: 3 | Status: SHIPPED | OUTPATIENT
Start: 2018-11-14 | End: 2019-07-08 | Stop reason: SDUPTHER

## 2018-11-14 NOTE — PROGRESS NOTES
Patient ID: Gisel Pepper is a 80 y o  male  Assessment/Plan:    Migraine without aura intractable in nature which could be secondary to high blood pressure  Lately patient blood pressure is sources staying very high  Plan patient will continue propranolol 160 mg daily however I have added verapamil  at to be taken at bedtime    Patient to continue taking his blood pressure and data they have to call me in within 2 weeks in reference to the blood pressure and number of the headaches  Verapamil prescription and sumatriptan prescription has been sent to his pharmacy with 3 refills each  Return to office visit in 4 months            Subjective:    67-year-old male followed in the office for migraine headache who also carries a diagnosis of a high blood pressure  In the month of September patient had a 7 headaches, October 7 headache, November patient had a 3 headaches in a row  In month of August patient had only 3 headaches  all headaches were at severity scale 7 or 8, in the back of the head which does not radiate in the front  Headache is described as a throbbing pain without nausea, vomiting, photophobia and phonophobia  Patient takes Imitrex for each of these headaches which helps relieving the headache within half an hour to 45 min  Patient denies any spots in front of the eyes zigzag line or vertigo      Pt denies double vision, blurred vision, transient monocular blindness, vertigo, tinnitus, hearing loss, slurred speech, difficulty expressing and understanding, dysphasia, and focal weakness, numbness, imbalance and incoordination  Pt denies bladder and bowel urgency, frequency and incontinence  Pt denies double vision, blurred vision, transient monocular blindness, vertigo, tinnitus, hearing loss, slurred speech, difficulty expressing and understanding, dysphasia, and focal weakness, numbness, imbalance and incoordination   Pt denies bladder and bowel urgency, frequency and incontinence  Past Medical History:   Diagnosis Date    Arthritis     Chest pain 02/01/2006    Chronic pain     lower back    Closed fracture of one rib 07/27/2007    Diabetes mellitus [address] [address] )     boarderline diabetes    Environmental allergies     GERD (gastroesophageal reflux disease)     Hiatal hernia     Hyperlipidemia 01/05/2015    Hypertension     Migraine     Sebaceous cyst 01/11/2016       Family History   Problem Relation Age of Onset    Arthritis Mother     Lung cancer Father     Asthma Brother    ,    Social History     Social History    Marital status: Single     Spouse name: N/A    Number of children: N/A    Years of education: N/A     Occupational History    Not on file       Social History Main Topics    Smoking status: Passive Smoke Exposure - Never Smoker    Smokeless tobacco: Never Used    Alcohol use 2 4 oz/week     4 Glasses of wine per week      Comment: social    Drug use: No    Sexual activity: Not on file     Other Topics Concern    Not on file     Social History Narrative    No narrative on file       Current Outpatient Prescriptions on File Prior to Visit   Medication Sig Dispense Refill    acetaminophen (TYLENOL ARTHRITIS PAIN) 650 mg CR tablet Take 650 mg by mouth as needed        Ascorbic Acid (VITAMIN C) 500 MG CAPS Take by mouth daily      benzonatate (TESSALON PERLES) 100 mg capsule Take 1 capsule (100 mg total) by mouth 3 (three) times a day as needed for cough 60 capsule 5    fluticasone (FLONASE) 50 mcg/act nasal spray 1 spray into each nostril daily      Magnesium Oxide 400 MG CAPS Take 1 tablet (400 mg total) by mouth 2 (two) times a day 60 each 3    montelukast (SINGULAIR) 10 mg tablet Take 1 tablet (10 mg total) by mouth daily with breakfast 90 tablet 3    Multiple Vitamin (MULTI-DAY PO) Take 1 tablet by mouth daily      pantoprazole (PROTONIX) 40 mg tablet Take 40 mg by mouth daily      propranolol (INDERAL LA) 160 mg Take 1 capsule (160 mg total) by mouth daily 90 capsule 1    thiamine (VITAMIN B1) 100 mg tablet Take by mouth daily      traMADol (ULTRAM-ER) 200 MG 24 hr tablet TAKE ONE TABLET BY MOUTH AT BEDTIME 90 tablet 1    [DISCONTINUED] SUMAtriptan (IMITREX) 100 mg tablet Take 1 tablet (100 mg total) by mouth once as needed for migraine for up to 1 dose 9 tablet 0    dicyclomine (BENTYL) 20 mg tablet Take 1 tablet by mouth 2 (two) times a day for 5 days 10 tablet 0    Ibuprofen (ADVIL MIGRAINE) 200 MG CAPS Take 600 mg by mouth as needed      Triamcinolone Acetonide 55 MCG/ACT AERO 2 puffs into each nostril daily       No current facility-administered medications on file prior to visit  Objective:    Blood pressure 158/98, pulse (!) 52, height 5' 1" (1 549 m), weight 74 4 kg (164 lb)  Physical Exam   Eyes: Pupils are equal, round, and reactive to light  EOM are normal    Neck: Normal carotid pulses present  Carotid bruit is not present  Neurological: He has normal strength and normal reflexes  Psychiatric: His speech is normal        Neurological Exam  Mental Status   Oriented to person, place, time and situation  Recent and remote memory are intact  Speech is normal  Language is fluent with no aphasia  Cranial Nerves  CN II: Visual fields full to confrontation  CN III, IV, VI: Extraocular movements intact bilaterally  Pupils equal round and reactive to light bilaterally  CN V: Facial sensation is normal   CN VII: Full and symmetric facial movement  CN VIII: Hearing is normal   CN IX, X: Palate elevates symmetrically  Normal gag reflex  CN XI: Shoulder shrug strength is normal   CN XII: Tongue midline without atrophy or fasciculations  Motor  Normal muscle bulk throughout  No fasciculations present  Normal muscle tone  Strength is 5/5 throughout all four extremities  Sensory  Sensation is intact to light touch, pinprick, vibration and proprioception in all four extremities  Light touch is normal in upper and lower extremities  Pinprick is normal in upper and lower extremities  Proprioception is normal in upper and lower extremities  Reflexes  Deep tendon reflexes are 2+ and symmetric in all four extremities with downgoing toes bilaterally  Coordination  Right: Finger-to-nose normal  Heel-to-shin normal   Left: Finger-to-nose normal  Heel-to-shin normal     Gait  Normal casual, toe, heel and tandem gait  ROS:    Review of Systems   Constitutional: Negative  HENT: Negative  Eyes: Negative  Respiratory: Negative  Cardiovascular: Negative  Gastrointestinal: Negative  Endocrine: Negative  Genitourinary: Negative  Musculoskeletal: Negative  Skin: Negative  Allergic/Immunologic: Negative  Neurological: Negative  Hematological: Negative  Psychiatric/Behavioral: Negative

## 2018-12-03 DIAGNOSIS — G43.009 MIGRAINE WITHOUT AURA AND WITHOUT STATUS MIGRAINOSUS, NOT INTRACTABLE: ICD-10-CM

## 2018-12-03 RX ORDER — CALCIUM CARBONATE/VITAMIN D3 500-10/5ML
400 LIQUID (ML) ORAL 2 TIMES DAILY
Qty: 60 EACH | Refills: 2 | Status: SHIPPED | OUTPATIENT
Start: 2018-12-03 | End: 2019-05-17 | Stop reason: SDUPTHER

## 2019-02-01 ENCOUNTER — OFFICE VISIT (OUTPATIENT)
Dept: FAMILY MEDICINE CLINIC | Facility: CLINIC | Age: 69
End: 2019-02-01
Payer: COMMERCIAL

## 2019-02-01 VITALS
HEART RATE: 48 BPM | TEMPERATURE: 97.5 F | DIASTOLIC BLOOD PRESSURE: 84 MMHG | RESPIRATION RATE: 16 BRPM | WEIGHT: 165 LBS | HEIGHT: 61 IN | SYSTOLIC BLOOD PRESSURE: 158 MMHG | BODY MASS INDEX: 31.15 KG/M2

## 2019-02-01 DIAGNOSIS — J30.9 ALLERGIC RHINITIS, UNSPECIFIED SEASONALITY, UNSPECIFIED TRIGGER: ICD-10-CM

## 2019-02-01 DIAGNOSIS — K21.9 GASTROESOPHAGEAL REFLUX DISEASE, ESOPHAGITIS PRESENCE NOT SPECIFIED: ICD-10-CM

## 2019-02-01 DIAGNOSIS — E66.9 OBESITY (BMI 30-39.9): ICD-10-CM

## 2019-02-01 DIAGNOSIS — I10 BENIGN ESSENTIAL HYPERTENSION: Primary | ICD-10-CM

## 2019-02-01 DIAGNOSIS — M53.9 SPINE DISORDER: ICD-10-CM

## 2019-02-01 PROBLEM — L03.119 CELLULITIS OF UPPER EXTREMITY: Status: RESOLVED | Noted: 2018-09-12 | Resolved: 2019-02-01

## 2019-02-01 PROBLEM — H60.543: Status: RESOLVED | Noted: 2018-11-13 | Resolved: 2019-02-01

## 2019-02-01 PROBLEM — H61.23 BILATERAL IMPACTED CERUMEN: Status: RESOLVED | Noted: 2018-10-12 | Resolved: 2019-02-01

## 2019-02-01 PROCEDURE — 99214 OFFICE O/P EST MOD 30 MIN: CPT | Performed by: FAMILY MEDICINE

## 2019-02-01 RX ORDER — RAMIPRIL 5 MG/1
5 CAPSULE ORAL DAILY
Qty: 90 CAPSULE | Refills: 1 | Status: SHIPPED | OUTPATIENT
Start: 2019-02-01 | End: 2019-03-04 | Stop reason: SDUPTHER

## 2019-02-01 RX ORDER — TRAMADOL HYDROCHLORIDE 200 MG/1
200 TABLET, EXTENDED RELEASE ORAL
Qty: 90 TABLET | Refills: 1 | Status: SHIPPED | OUTPATIENT
Start: 2019-02-01 | End: 2019-05-17 | Stop reason: SDUPTHER

## 2019-02-01 NOTE — PROGRESS NOTES
Assessment/Plan:    Allergic rhinitis  stable    Benign essential hypertension  Not controlled despite recently added verapamil by Dr Sammie Ott for headaches  Pulse low  Add ramipril 10mg/d  GE reflux  Stable  Recent data suggesting increased risk of ischemic CVA and chronic kidney damage with PPI use was advised  Spine disorder  Cont tramadol ER, fall risk  refilled    Obesity (BMI 30-39  9)  BMI Counseling: Body mass index is 31 18 kg/m²  Discussed the patient's BMI with him  The BMI is above average  BMI counseling and education was provided to the patient  Nutrition recommendations include decreasing overall calorie intake  He notes edema of feet but not much seen on exam  bp is not ideally controlled, even with added verapamil  He will try to record HA frequency better, but thus far, not much less headaches since adding the verapamil, sister with help and report headaches to Dr Sammie Ott as she requested  Pulse is very borderline and increasing bb or ccb may not be wise    Is another alternative headache prophylaxis option better for headache control? (non BP medication related) so that headaches better but I will take take of bp issues  I am inclined to better control BP today by reintroducing ramipril 10mg/d, recheck in 1m     Diagnoses and all orders for this visit:    Benign essential hypertension  -     ramipril (ALTACE) 5 mg capsule; Take 1 capsule (5 mg total) by mouth daily    Gastroesophageal reflux disease, esophagitis presence not specified    Allergic rhinitis, unspecified seasonality, unspecified trigger    Spine disorder  -     traMADol (ULTRAM-ER) 200 MG 24 hr tablet; Take 1 tablet (200 mg total) by mouth daily at bedtime    Obesity (BMI 30-39  9)              Return in about 1 month (around 3/1/2019)  Subjective:      Patient ID: Maya Nielsen is a 80 y o  male      Chief Complaint   Patient presents with    Blood Pressure Check     pt complaining of High blood pressure and swollen feet       HPI  High at home  sbp 180s  dbp 70s  At rest  Did not bring in machine  Neurologist gave him verapamil 6w ago  Still on propranolol  Now on verapamil 180mg/d    Has had about 6 headaches this past month  No constipation  Noted swelling in feet  Small amount in ankles  Worse at night  Better in am, very little    Taking all meds    The following portions of the patient's history were reviewed and updated as appropriate: allergies, current medications, past family history, past medical history, past social history, past surgical history and problem list     Review of Systems   Respiratory: Negative for shortness of breath  Cardiovascular: Negative for chest pain           Current Outpatient Prescriptions   Medication Sig Dispense Refill    acetaminophen (TYLENOL ARTHRITIS PAIN) 650 mg CR tablet Take 650 mg by mouth as needed        Ascorbic Acid (VITAMIN C) 500 MG CAPS Take by mouth daily      benzonatate (TESSALON PERLES) 100 mg capsule Take 1 capsule (100 mg total) by mouth 3 (three) times a day as needed for cough 60 capsule 5    fluticasone (FLONASE) 50 mcg/act nasal spray 1 spray into each nostril daily      Ibuprofen (ADVIL MIGRAINE) 200 MG CAPS Take 600 mg by mouth as needed      Magnesium Oxide 400 MG CAPS Take 1 tablet (400 mg total) by mouth 2 (two) times a day 60 each 2    montelukast (SINGULAIR) 10 mg tablet Take 1 tablet (10 mg total) by mouth daily with breakfast 90 tablet 3    Multiple Vitamin (MULTI-DAY PO) Take 1 tablet by mouth daily      pantoprazole (PROTONIX) 40 mg tablet Take 40 mg by mouth daily      propranolol (INDERAL LA) 160 mg Take 1 capsule (160 mg total) by mouth daily 90 capsule 1    SUMAtriptan (IMITREX) 100 mg tablet Take 1 tablet (100 mg total) by mouth once as needed for migraine for up to 1 dose 9 tablet 3    thiamine (VITAMIN B1) 100 mg tablet Take by mouth daily      traMADol (ULTRAM-ER) 200 MG 24 hr tablet Take 1 tablet (200 mg total) by mouth daily at bedtime 90 tablet 1    verapamil (CALAN-SR) 180 mg CR tablet Take 1 tablet (180 mg total) by mouth daily at bedtime 30 tablet 3    ramipril (ALTACE) 5 mg capsule Take 1 capsule (5 mg total) by mouth daily 90 capsule 1     No current facility-administered medications for this visit  Objective:    /84   Pulse (!) 48   Temp 97 5 °F (36 4 °C)   Resp 16   Ht 5' 1" (1 549 m)   Wt 74 8 kg (165 lb)   BMI 31 18 kg/m²        Physical Exam   Constitutional: He appears well-developed  No distress  HENT:   Head: Normocephalic  Eyes: Conjunctivae are normal  No scleral icterus  Neck: Neck supple  Cardiovascular: Normal rate and intact distal pulses  No murmur heard  Pulmonary/Chest: Effort normal  No respiratory distress  Abdominal: Soft  There is no tenderness  Musculoskeletal: He exhibits no edema or deformity  Lymphadenopathy:     He has no cervical adenopathy  Neurological: He is alert  Skin: Skin is warm and dry  No rash noted  No pallor  Psychiatric: His behavior is normal  Thought content normal    Nursing note and vitals reviewed               Perla Garcia DO

## 2019-02-02 NOTE — ASSESSMENT & PLAN NOTE
BMI Counseling: Body mass index is 31 18 kg/m²  Discussed the patient's BMI with him  The BMI is above average  BMI counseling and education was provided to the patient  Nutrition recommendations include decreasing overall calorie intake

## 2019-02-02 NOTE — ASSESSMENT & PLAN NOTE
Stable  Recent data suggesting increased risk of ischemic CVA and chronic kidney damage with PPI use was advised

## 2019-02-02 NOTE — ASSESSMENT & PLAN NOTE
Not controlled despite recently added verapamil by Dr William Godfrey for headaches  Pulse low  Add ramipril 10mg/d

## 2019-03-04 ENCOUNTER — OFFICE VISIT (OUTPATIENT)
Dept: FAMILY MEDICINE CLINIC | Facility: CLINIC | Age: 69
End: 2019-03-04
Payer: COMMERCIAL

## 2019-03-04 VITALS
BODY MASS INDEX: 31.34 KG/M2 | RESPIRATION RATE: 16 BRPM | WEIGHT: 166 LBS | HEART RATE: 58 BPM | DIASTOLIC BLOOD PRESSURE: 80 MMHG | TEMPERATURE: 98.2 F | HEIGHT: 61 IN | SYSTOLIC BLOOD PRESSURE: 154 MMHG

## 2019-03-04 DIAGNOSIS — E66.9 OBESITY (BMI 30-39.9): ICD-10-CM

## 2019-03-04 DIAGNOSIS — Z12.5 PROSTATE CANCER SCREENING: ICD-10-CM

## 2019-03-04 DIAGNOSIS — R73.01 IFG (IMPAIRED FASTING GLUCOSE): Primary | ICD-10-CM

## 2019-03-04 DIAGNOSIS — I10 BENIGN ESSENTIAL HYPERTENSION: ICD-10-CM

## 2019-03-04 PROCEDURE — 99214 OFFICE O/P EST MOD 30 MIN: CPT | Performed by: FAMILY MEDICINE

## 2019-03-04 PROCEDURE — 1160F RVW MEDS BY RX/DR IN RCRD: CPT | Performed by: FAMILY MEDICINE

## 2019-03-04 PROCEDURE — 1036F TOBACCO NON-USER: CPT | Performed by: FAMILY MEDICINE

## 2019-03-04 PROCEDURE — 3008F BODY MASS INDEX DOCD: CPT | Performed by: FAMILY MEDICINE

## 2019-03-04 RX ORDER — RAMIPRIL 10 MG/1
10 CAPSULE ORAL DAILY
Qty: 90 CAPSULE | Refills: 1 | Status: SHIPPED | OUTPATIENT
Start: 2019-03-04 | End: 2019-10-21 | Stop reason: SDUPTHER

## 2019-03-04 NOTE — PROGRESS NOTES
Assessment/Plan:    No problem-specific Assessment & Plan notes found for this encounter     htn improved, cont verapamil/propranolol for headaches and 10mg ramipril for htn    Headaches about the same, neuro f/u    Tramadol risks in the elderly advised, also risk of serotonin syndrome with migraine meds, aware    Right leg pain, normal knee exam, possible lumbar radiculopathy, sister says had hx of abnormal MRI lumbar in past, worse to get in/out of car, ok with walking so doubt claudication, chronic edema noted may be due to varicose veins, sister with pursue orthopedic evaluation    Prediabetes aware, BMI advised     Diagnoses and all orders for this visit:    IFG (impaired fasting glucose)  -     Comprehensive metabolic panel; Future  -     Hemoglobin A1C; Future    Prostate cancer screening  -     PSA, Total Screen; Future    Benign essential hypertension  -     ramipril (ALTACE) 10 MG capsule; Take 1 capsule (10 mg total) by mouth daily  -     Comprehensive metabolic panel; Future    Obesity (BMI 30-39  9)              No follow-ups on file  Subjective:      Patient ID: Mj Lan is a 77 y o  male  Chief Complaint   Patient presents with    Follow-up     1m f/u       HPI  About 4 headaches in past month  Not constipated  Still some swelling in feet  No pain  No cough    Pulse range 48-60  bp range 130-140 but 160s past 2 days  Limits salt      The following portions of the patient's history were reviewed and updated as appropriate: allergies, current medications, past family history, past medical history, past social history, past surgical history and problem list     Review of Systems   Respiratory: Negative for shortness of breath  Cardiovascular: Positive for leg swelling  Negative for chest pain and palpitations           Current Outpatient Medications   Medication Sig Dispense Refill    acetaminophen (TYLENOL ARTHRITIS PAIN) 650 mg CR tablet Take 650 mg by mouth as needed        Ascorbic Acid (VITAMIN C) 500 MG CAPS Take by mouth daily      fluticasone (FLONASE) 50 mcg/act nasal spray 1 spray into each nostril daily      Ibuprofen (ADVIL MIGRAINE) 200 MG CAPS Take 600 mg by mouth as needed      Magnesium Oxide 400 MG CAPS Take 1 tablet (400 mg total) by mouth 2 (two) times a day 60 each 2    montelukast (SINGULAIR) 10 mg tablet Take 1 tablet (10 mg total) by mouth daily with breakfast 90 tablet 3    Multiple Vitamin (MULTI-DAY PO) Take 1 tablet by mouth daily      pantoprazole (PROTONIX) 40 mg tablet Take 40 mg by mouth daily      propranolol (INDERAL LA) 160 mg Take 1 capsule (160 mg total) by mouth daily 90 capsule 1    ramipril (ALTACE) 10 MG capsule Take 1 capsule (10 mg total) by mouth daily 90 capsule 1    SUMAtriptan (IMITREX) 100 mg tablet Take 1 tablet (100 mg total) by mouth once as needed for migraine for up to 1 dose 9 tablet 3    thiamine (VITAMIN B1) 100 mg tablet Take by mouth daily      traMADol (ULTRAM-ER) 200 MG 24 hr tablet Take 1 tablet (200 mg total) by mouth daily at bedtime 90 tablet 1    verapamil (CALAN-SR) 180 mg CR tablet Take 1 tablet (180 mg total) by mouth daily at bedtime 30 tablet 3     No current facility-administered medications for this visit  Objective:    /80   Pulse 58   Temp 98 2 °F (36 8 °C)   Resp 16   Ht 5' 1" (1 549 m)   Wt 75 3 kg (166 lb)   BMI 31 37 kg/m²        Physical Exam   Constitutional: He appears well-developed and well-nourished  No distress  HENT:   Head: Normocephalic  Mouth/Throat: No oropharyngeal exudate  Eyes: Conjunctivae are normal  Right eye exhibits no discharge  Left eye exhibits no discharge  No scleral icterus  Neck: Neck supple  Carotid bruit is not present  Cardiovascular: Normal rate and intact distal pulses  Pulmonary/Chest: Effort normal  No respiratory distress  He has no wheezes  Abdominal: Soft  He exhibits no distension  There is no tenderness     Musculoskeletal: He exhibits no edema or deformity  Lymphadenopathy:     He has no cervical adenopathy  Neurological: He is alert  Skin: Skin is warm and dry  Capillary refill takes less than 2 seconds  No pallor  Psychiatric: His behavior is normal  Thought content normal    Nursing note and vitals reviewed               Garry Duane, DO

## 2019-03-05 ENCOUNTER — TELEPHONE (OUTPATIENT)
Dept: FAMILY MEDICINE CLINIC | Facility: CLINIC | Age: 69
End: 2019-03-05

## 2019-03-05 NOTE — TELEPHONE ENCOUNTER
PA was done over the phone [facility] ID [id number]  PA was approved until 03/04/2020 Pharmacy and pt are aware    Task complete  rmklpn

## 2019-04-05 DIAGNOSIS — R73.01 IFG (IMPAIRED FASTING GLUCOSE): ICD-10-CM

## 2019-04-05 DIAGNOSIS — I10 BENIGN ESSENTIAL HYPERTENSION: ICD-10-CM

## 2019-04-05 DIAGNOSIS — K21.9 GASTROESOPHAGEAL REFLUX DISEASE, ESOPHAGITIS PRESENCE NOT SPECIFIED: ICD-10-CM

## 2019-04-05 RX ORDER — MONTELUKAST SODIUM 10 MG/1
10 TABLET ORAL
Qty: 90 TABLET | Refills: 1 | Status: SHIPPED | OUTPATIENT
Start: 2019-04-05 | End: 2019-10-21 | Stop reason: SDUPTHER

## 2019-04-10 LAB
ALBUMIN SERPL-MCNC: 3.9 G/DL (ref 3.6–4.8)
ALBUMIN/GLOB SERPL: 1.7 {RATIO} (ref 1.2–2.2)
ALP SERPL-CCNC: 109 IU/L (ref 39–117)
ALT SERPL-CCNC: 20 IU/L (ref 0–44)
AST SERPL-CCNC: 19 IU/L (ref 0–40)
BILIRUB SERPL-MCNC: 0.3 MG/DL (ref 0–1.2)
BUN SERPL-MCNC: 22 MG/DL (ref 8–27)
BUN/CREAT SERPL: 20 (ref 10–24)
CALCIUM SERPL-MCNC: 8.5 MG/DL (ref 8.6–10.2)
CHLORIDE SERPL-SCNC: 106 MMOL/L (ref 96–106)
CO2 SERPL-SCNC: 23 MMOL/L (ref 20–29)
CREAT SERPL-MCNC: 1.11 MG/DL (ref 0.76–1.27)
GLOBULIN SER-MCNC: 2.3 G/DL (ref 1.5–4.5)
GLUCOSE SERPL-MCNC: 115 MG/DL (ref 65–99)
HBA1C MFR BLD: 5.9 % (ref 4.8–5.6)
LABCORP COMMENT: NORMAL
POTASSIUM SERPL-SCNC: 4.5 MMOL/L (ref 3.5–5.2)
PROT SERPL-MCNC: 6.2 G/DL (ref 6–8.5)
PSA SERPL-MCNC: 3.4 NG/ML (ref 0–4)
SL AMB EGFR AFRICAN AMERICAN: 78 ML/MIN/1.73
SL AMB EGFR NON AFRICAN AMERICAN: 68 ML/MIN/1.73
SODIUM SERPL-SCNC: 141 MMOL/L (ref 134–144)

## 2019-04-15 ENCOUNTER — OFFICE VISIT (OUTPATIENT)
Dept: FAMILY MEDICINE CLINIC | Facility: CLINIC | Age: 69
End: 2019-04-15
Payer: COMMERCIAL

## 2019-04-15 VITALS
DIASTOLIC BLOOD PRESSURE: 64 MMHG | RESPIRATION RATE: 16 BRPM | BODY MASS INDEX: 31.34 KG/M2 | SYSTOLIC BLOOD PRESSURE: 122 MMHG | HEIGHT: 61 IN | WEIGHT: 166 LBS | HEART RATE: 52 BPM | TEMPERATURE: 97.8 F

## 2019-04-15 DIAGNOSIS — Z13.89 SCREENING FOR CARDIOVASCULAR, RESPIRATORY, AND GENITOURINARY DISEASES: ICD-10-CM

## 2019-04-15 DIAGNOSIS — Z13.83 SCREENING FOR CARDIOVASCULAR, RESPIRATORY, AND GENITOURINARY DISEASES: ICD-10-CM

## 2019-04-15 DIAGNOSIS — K21.9 GASTROESOPHAGEAL REFLUX DISEASE WITHOUT ESOPHAGITIS: Primary | ICD-10-CM

## 2019-04-15 DIAGNOSIS — I10 BENIGN ESSENTIAL HYPERTENSION: ICD-10-CM

## 2019-04-15 DIAGNOSIS — R73.01 IFG (IMPAIRED FASTING GLUCOSE): ICD-10-CM

## 2019-04-15 DIAGNOSIS — Z13.6 SCREENING FOR CARDIOVASCULAR, RESPIRATORY, AND GENITOURINARY DISEASES: ICD-10-CM

## 2019-04-15 DIAGNOSIS — G43.009 MIGRAINE WITHOUT AURA AND WITHOUT STATUS MIGRAINOSUS, NOT INTRACTABLE: ICD-10-CM

## 2019-04-15 PROCEDURE — 99214 OFFICE O/P EST MOD 30 MIN: CPT | Performed by: FAMILY MEDICINE

## 2019-04-15 RX ORDER — PROPRANOLOL HYDROCHLORIDE 160 MG/1
160 CAPSULE, EXTENDED RELEASE ORAL DAILY
Qty: 90 CAPSULE | Refills: 1 | Status: SHIPPED | OUTPATIENT
Start: 2019-04-15 | End: 2019-09-05 | Stop reason: SDUPTHER

## 2019-04-15 RX ORDER — PANTOPRAZOLE SODIUM 40 MG/1
40 TABLET, DELAYED RELEASE ORAL DAILY
Qty: 90 TABLET | Refills: 1 | Status: SHIPPED | OUTPATIENT
Start: 2019-04-15 | End: 2020-04-24 | Stop reason: SDUPTHER

## 2019-05-17 DIAGNOSIS — G43.009 MIGRAINE WITHOUT AURA AND WITHOUT STATUS MIGRAINOSUS, NOT INTRACTABLE: ICD-10-CM

## 2019-05-17 DIAGNOSIS — M53.9 SPINE DISORDER: ICD-10-CM

## 2019-05-19 RX ORDER — TRAMADOL HYDROCHLORIDE 200 MG/1
200 TABLET, EXTENDED RELEASE ORAL
Qty: 90 TABLET | Refills: 0 | Status: SHIPPED | OUTPATIENT
Start: 2019-05-19 | End: 2019-10-21

## 2019-05-22 ENCOUNTER — OFFICE VISIT (OUTPATIENT)
Dept: NEUROLOGY | Facility: CLINIC | Age: 69
End: 2019-05-22
Payer: COMMERCIAL

## 2019-05-22 VITALS
DIASTOLIC BLOOD PRESSURE: 82 MMHG | HEART RATE: 72 BPM | SYSTOLIC BLOOD PRESSURE: 128 MMHG | BODY MASS INDEX: 31.15 KG/M2 | HEIGHT: 61 IN | WEIGHT: 165 LBS

## 2019-05-22 DIAGNOSIS — G43.009 MIGRAINE WITHOUT AURA AND WITHOUT STATUS MIGRAINOSUS, NOT INTRACTABLE: Primary | ICD-10-CM

## 2019-05-22 PROCEDURE — 99214 OFFICE O/P EST MOD 30 MIN: CPT | Performed by: PSYCHIATRY & NEUROLOGY

## 2019-05-22 RX ORDER — LANOLIN ALCOHOL/MO/W.PET/CERES
CREAM (GRAM) TOPICAL
Qty: 60 TABLET | Refills: 2 | Status: SHIPPED | OUTPATIENT
Start: 2019-05-22 | End: 2019-06-02

## 2019-05-29 NOTE — TELEPHONE ENCOUNTER
Problem with Tramadol Refill  [phone]  ID [id number]  Insurance called since PA had been approved in March  Pharmacy was running a 90 day supply  Insurance will only pay for a 30 day supply  Called family making them aware     Task Complete  rmklpn

## 2019-06-02 RX ORDER — CALCIUM CARBONATE/VITAMIN D3 500-10/5ML
400 LIQUID (ML) ORAL 2 TIMES DAILY
COMMUNITY

## 2019-06-02 RX ORDER — FUROSEMIDE 20 MG/1
20 TABLET ORAL
COMMUNITY
Start: 2019-05-27 | End: 2019-06-03

## 2019-06-03 ENCOUNTER — OFFICE VISIT (OUTPATIENT)
Dept: FAMILY MEDICINE CLINIC | Facility: CLINIC | Age: 69
End: 2019-06-03
Payer: COMMERCIAL

## 2019-06-03 VITALS
TEMPERATURE: 97.9 F | WEIGHT: 166.2 LBS | BODY MASS INDEX: 31.38 KG/M2 | HEART RATE: 72 BPM | SYSTOLIC BLOOD PRESSURE: 132 MMHG | RESPIRATION RATE: 20 BRPM | DIASTOLIC BLOOD PRESSURE: 68 MMHG | HEIGHT: 61 IN

## 2019-06-03 DIAGNOSIS — J30.9 ALLERGIC RHINITIS, UNSPECIFIED SEASONALITY, UNSPECIFIED TRIGGER: Primary | ICD-10-CM

## 2019-06-03 DIAGNOSIS — K21.9 GASTROESOPHAGEAL REFLUX DISEASE, ESOPHAGITIS PRESENCE NOT SPECIFIED: ICD-10-CM

## 2019-06-03 DIAGNOSIS — I10 BENIGN ESSENTIAL HYPERTENSION: ICD-10-CM

## 2019-06-03 DIAGNOSIS — G43.009 MIGRAINE WITHOUT AURA AND WITHOUT STATUS MIGRAINOSUS, NOT INTRACTABLE: ICD-10-CM

## 2019-06-03 DIAGNOSIS — R06.02 SOB (SHORTNESS OF BREATH): ICD-10-CM

## 2019-06-03 PROCEDURE — 3725F SCREEN DEPRESSION PERFORMED: CPT | Performed by: FAMILY MEDICINE

## 2019-06-03 PROCEDURE — 3008F BODY MASS INDEX DOCD: CPT | Performed by: FAMILY MEDICINE

## 2019-06-03 PROCEDURE — 1036F TOBACCO NON-USER: CPT | Performed by: FAMILY MEDICINE

## 2019-06-03 PROCEDURE — 1160F RVW MEDS BY RX/DR IN RCRD: CPT | Performed by: FAMILY MEDICINE

## 2019-06-03 PROCEDURE — 3078F DIAST BP <80 MM HG: CPT | Performed by: FAMILY MEDICINE

## 2019-06-03 PROCEDURE — 99214 OFFICE O/P EST MOD 30 MIN: CPT | Performed by: FAMILY MEDICINE

## 2019-06-03 PROCEDURE — 3075F SYST BP GE 130 - 139MM HG: CPT | Performed by: FAMILY MEDICINE

## 2019-06-03 RX ORDER — ALBUTEROL SULFATE 90 UG/1
2 AEROSOL, METERED RESPIRATORY (INHALATION) EVERY 6 HOURS PRN
Qty: 1 INHALER | Refills: 5 | Status: SHIPPED | OUTPATIENT
Start: 2019-06-03 | End: 2020-06-02 | Stop reason: SDUPTHER

## 2019-06-04 ENCOUNTER — TELEPHONE (OUTPATIENT)
Dept: FAMILY MEDICINE CLINIC | Facility: CLINIC | Age: 69
End: 2019-06-04

## 2019-07-08 DIAGNOSIS — G43.009 MIGRAINE WITHOUT AURA AND WITHOUT STATUS MIGRAINOSUS, NOT INTRACTABLE: ICD-10-CM

## 2019-07-08 RX ORDER — SUMATRIPTAN 100 MG/1
100 TABLET, FILM COATED ORAL ONCE AS NEEDED
Qty: 9 TABLET | Refills: 0 | Status: SHIPPED | OUTPATIENT
Start: 2019-07-08 | End: 2019-09-05 | Stop reason: SDUPTHER

## 2019-07-30 ENCOUNTER — TELEPHONE (OUTPATIENT)
Dept: FAMILY MEDICINE CLINIC | Facility: CLINIC | Age: 69
End: 2019-07-30

## 2019-07-30 DIAGNOSIS — R06.02 SOB (SHORTNESS OF BREATH): Primary | ICD-10-CM

## 2019-07-30 NOTE — TELEPHONE ENCOUNTER
DR Kimberly Otoole     Patients sister called here to say patient needs a tube called to the pharmacy to go with his inhaler? She said that its used for children  But her brother needs it? Please advise  [facility] pharmacy

## 2019-07-31 NOTE — TELEPHONE ENCOUNTER
Faxed to [facility]    Confirmation fax received    No further action needed

## 2019-07-31 NOTE — TELEPHONE ENCOUNTER
Melo Marcos to let her know I will fax the script over to [facility], just waiting for them to open so I can get their fax number    Awaiting fax number

## 2019-09-05 DIAGNOSIS — G43.009 MIGRAINE WITHOUT AURA AND WITHOUT STATUS MIGRAINOSUS, NOT INTRACTABLE: ICD-10-CM

## 2019-09-05 DIAGNOSIS — I10 BENIGN ESSENTIAL HYPERTENSION: ICD-10-CM

## 2019-09-05 RX ORDER — PROPRANOLOL HYDROCHLORIDE 160 MG/1
160 CAPSULE, EXTENDED RELEASE ORAL DAILY
Qty: 90 CAPSULE | Refills: 1 | Status: SHIPPED | OUTPATIENT
Start: 2019-09-05 | End: 2020-04-24 | Stop reason: SDUPTHER

## 2019-09-05 RX ORDER — SUMATRIPTAN 100 MG/1
100 TABLET, FILM COATED ORAL ONCE AS NEEDED
Qty: 9 TABLET | Refills: 0 | Status: SHIPPED | OUTPATIENT
Start: 2019-09-05 | End: 2019-10-23 | Stop reason: SDUPTHER

## 2019-10-12 LAB
ALBUMIN SERPL-MCNC: 4.2 G/DL (ref 3.6–4.8)
ALBUMIN/GLOB SERPL: 1.8 {RATIO} (ref 1.2–2.2)
ALP SERPL-CCNC: 100 IU/L (ref 39–117)
ALT SERPL-CCNC: 19 IU/L (ref 0–44)
AST SERPL-CCNC: 15 IU/L (ref 0–40)
BILIRUB SERPL-MCNC: 0.4 MG/DL (ref 0–1.2)
BUN SERPL-MCNC: 29 MG/DL (ref 8–27)
BUN/CREAT SERPL: 27 (ref 10–24)
CALCIUM SERPL-MCNC: 9.4 MG/DL (ref 8.6–10.2)
CHLORIDE SERPL-SCNC: 105 MMOL/L (ref 96–106)
CHOLEST SERPL-MCNC: 262 MG/DL (ref 100–199)
CO2 SERPL-SCNC: 22 MMOL/L (ref 20–29)
CREAT SERPL-MCNC: 1.07 MG/DL (ref 0.76–1.27)
GLOBULIN SER-MCNC: 2.3 G/DL (ref 1.5–4.5)
GLUCOSE SERPL-MCNC: 119 MG/DL (ref 65–99)
HBA1C MFR BLD: 6 % (ref 4.8–5.6)
HDLC SERPL-MCNC: 40 MG/DL
LDLC SERPL CALC-MCNC: ABNORMAL MG/DL (ref 0–99)
LDLC SERPL DIRECT ASSAY-MCNC: 149 MG/DL (ref 0–99)
MICRODELETION SYND BLD/T FISH: NORMAL
POTASSIUM SERPL-SCNC: 4.9 MMOL/L (ref 3.5–5.2)
PROT SERPL-MCNC: 6.5 G/DL (ref 6–8.5)
SL AMB EGFR AFRICAN AMERICAN: 82 ML/MIN/1.73
SL AMB EGFR NON AFRICAN AMERICAN: 71 ML/MIN/1.73
SODIUM SERPL-SCNC: 144 MMOL/L (ref 134–144)
TRIGL SERPL-MCNC: 502 MG/DL (ref 0–149)

## 2019-10-21 ENCOUNTER — OFFICE VISIT (OUTPATIENT)
Dept: FAMILY MEDICINE CLINIC | Facility: CLINIC | Age: 69
End: 2019-10-21
Payer: COMMERCIAL

## 2019-10-21 VITALS
TEMPERATURE: 98 F | RESPIRATION RATE: 16 BRPM | BODY MASS INDEX: 32.28 KG/M2 | DIASTOLIC BLOOD PRESSURE: 80 MMHG | HEART RATE: 57 BPM | SYSTOLIC BLOOD PRESSURE: 136 MMHG | HEIGHT: 61 IN | WEIGHT: 171 LBS | OXYGEN SATURATION: 94 %

## 2019-10-21 DIAGNOSIS — I10 BENIGN ESSENTIAL HYPERTENSION: ICD-10-CM

## 2019-10-21 DIAGNOSIS — K21.9 GASTROESOPHAGEAL REFLUX DISEASE, ESOPHAGITIS PRESENCE NOT SPECIFIED: ICD-10-CM

## 2019-10-21 DIAGNOSIS — Z12.5 PROSTATE CANCER SCREENING: ICD-10-CM

## 2019-10-21 DIAGNOSIS — R73.01 IFG (IMPAIRED FASTING GLUCOSE): ICD-10-CM

## 2019-10-21 DIAGNOSIS — E78.1 HIGH TRIGLYCERIDES: ICD-10-CM

## 2019-10-21 DIAGNOSIS — L30.9 HAND DERMATITIS: ICD-10-CM

## 2019-10-21 DIAGNOSIS — Z00.00 MEDICARE ANNUAL WELLNESS VISIT, SUBSEQUENT: Primary | ICD-10-CM

## 2019-10-21 PROCEDURE — G0439 PPPS, SUBSEQ VISIT: HCPCS | Performed by: FAMILY MEDICINE

## 2019-10-21 PROCEDURE — 99214 OFFICE O/P EST MOD 30 MIN: CPT | Performed by: FAMILY MEDICINE

## 2019-10-21 PROCEDURE — 3075F SYST BP GE 130 - 139MM HG: CPT | Performed by: FAMILY MEDICINE

## 2019-10-21 PROCEDURE — 3079F DIAST BP 80-89 MM HG: CPT | Performed by: FAMILY MEDICINE

## 2019-10-21 PROCEDURE — 1125F AMNT PAIN NOTED PAIN PRSNT: CPT | Performed by: FAMILY MEDICINE

## 2019-10-21 PROCEDURE — 1170F FXNL STATUS ASSESSED: CPT | Performed by: FAMILY MEDICINE

## 2019-10-21 RX ORDER — MONTELUKAST SODIUM 10 MG/1
10 TABLET ORAL
Qty: 90 TABLET | Refills: 1 | Status: SHIPPED | OUTPATIENT
Start: 2019-10-21 | End: 2020-04-24 | Stop reason: SDUPTHER

## 2019-10-21 RX ORDER — RAMIPRIL 10 MG/1
10 CAPSULE ORAL DAILY
Qty: 90 CAPSULE | Refills: 1 | Status: SHIPPED | OUTPATIENT
Start: 2019-10-21 | End: 2020-04-24 | Stop reason: SDUPTHER

## 2019-10-21 RX ORDER — MUPIROCIN CALCIUM 20 MG/G
CREAM TOPICAL 3 TIMES DAILY
Qty: 30 G | Refills: 5 | Status: SHIPPED | OUTPATIENT
Start: 2019-10-21 | End: 2020-06-02 | Stop reason: SDUPTHER

## 2019-10-21 NOTE — PROGRESS NOTES
Assessment/Plan:    No problem-specific Assessment & Plan notes found for this encounter  High trig over 500, he will try harder with TLC for 6m before starting meds  Htn/headaches/gerd stable  PPI use prn  ifg unchanged  Hand dermatitis, sister requests bactroban to use prn irritation since otc not helping     Diagnoses and all orders for this visit:    Medicare annual wellness visit, subsequent    Benign essential hypertension  -     montelukast (SINGULAIR) 10 mg tablet; Take 1 tablet (10 mg total) by mouth daily with breakfast  -     ramipril (ALTACE) 10 MG capsule; Take 1 capsule (10 mg total) by mouth daily  -     Comprehensive metabolic panel; Future    Gastroesophageal reflux disease, esophagitis presence not specified  -     montelukast (SINGULAIR) 10 mg tablet; Take 1 tablet (10 mg total) by mouth daily with breakfast    IFG (impaired fasting glucose)  -     montelukast (SINGULAIR) 10 mg tablet; Take 1 tablet (10 mg total) by mouth daily with breakfast  -     Hemoglobin A1C; Future    High triglycerides  -     Lipid Panel with Direct LDL reflex; Future    Prostate cancer screening  -     PSA, Total Screen; Future    Hand dermatitis  -     mupirocin (BACTROBAN) 2 % cream; Apply topically 3 (three) times a day Prn, hands        Return in about 6 months (around 4/21/2020) for Recheck  Subjective:      Patient ID: Nathan Trotter is a 77 y o  male      Chief Complaint   Patient presents with    Follow-up     6 month f/[id number]-w[facility]ma       HPI  Taking all meds  Walking  Eating healthier  Some snacks, tapioca, popcorn  Mostly eating healthy  Headaches still, about 2-3 per month  imitrex helps  Not on tramadol anymore  Not much cough  SAMAN infrequently, jackson in cold weather  Recurrent rash on back of hands, appears infected sometimes, neosporin not helping    The following portions of the patient's history were reviewed and updated as appropriate: allergies, current medications, past family history, past medical history, past social history, past surgical history and problem list     Review of Systems   Constitutional: Negative for chills and fever  Current Outpatient Medications   Medication Sig Dispense Refill    acetaminophen (TYLENOL ARTHRITIS PAIN) 650 mg CR tablet Take 650 mg by mouth as needed        albuterol (PROVENTIL HFA,VENTOLIN HFA) 90 mcg/act inhaler Inhale 2 puffs every 6 (six) hours as needed for wheezing or shortness of breath (swish/spit after use) 1 Inhaler 5    Ascorbic Acid (VITAMIN C) 500 MG CAPS Take by mouth daily      fluticasone (FLONASE) 50 mcg/act nasal spray 1 spray into each nostril daily      Ibuprofen (ADVIL MIGRAINE) 200 MG CAPS Take 600 mg by mouth as needed      Magnesium Oxide 400 MG CAPS Take 400 mg by mouth      montelukast (SINGULAIR) 10 mg tablet Take 1 tablet (10 mg total) by mouth daily with breakfast 90 tablet 1    Multiple Vitamin (MULTI-DAY PO) Take 1 tablet by mouth daily      pantoprazole (PROTONIX) 40 mg tablet Take 1 tablet (40 mg total) by mouth daily 90 tablet 1    propranolol (INDERAL LA) 160 mg Take 1 capsule (160 mg total) by mouth daily 90 capsule 1    ramipril (ALTACE) 10 MG capsule Take 1 capsule (10 mg total) by mouth daily 90 capsule 1    SUMAtriptan (IMITREX) 100 mg tablet Take 1 tablet (100 mg total) by mouth once as needed for migraine for up to 1 dose 9 tablet 0    thiamine (VITAMIN B1) 100 mg tablet Take by mouth daily      verapamil (CALAN-SR) 180 mg CR tablet TAKE 1 TABLET (180 MG TOTAL) BY MOUTH DAILY AT BEDTIME 30 tablet 3    mupirocin (BACTROBAN) 2 % cream Apply topically 3 (three) times a day Prn, hands 30 g 5     No current facility-administered medications for this visit  Objective:    /80   Pulse 57   Temp 98 °F (36 7 °C)   Resp 16   Ht 5' 1" (1 549 m)   Wt 77 6 kg (171 lb)   SpO2 94%   BMI 32 31 kg/m²        Physical Exam   Constitutional: He appears well-developed  No distress  HENT:   Head: Normocephalic  Right Ear: External ear normal    Left Ear: External ear normal    Eyes: Conjunctivae are normal  No scleral icterus  Neck: Neck supple  No thyromegaly present  Cardiovascular: Normal rate, regular rhythm and intact distal pulses  No murmur heard  Pulmonary/Chest: Effort normal and breath sounds normal  No respiratory distress  He has no wheezes  Abdominal: Soft  Bowel sounds are normal  He exhibits no distension  There is no tenderness  Musculoskeletal: He exhibits no edema or deformity  Neurological: He is alert  He exhibits normal muscle tone  Skin: Skin is warm and dry  No pallor  Psychiatric: His behavior is normal  Thought content normal    Nursing note and vitals reviewed  Robert Perez, DO   Assessment and Plan:     Problem List Items Addressed This Visit        Active Problems    Benign essential hypertension    Relevant Medications    montelukast (SINGULAIR) 10 mg tablet    ramipril (ALTACE) 10 MG capsule    Other Relevant Orders    Comprehensive metabolic panel    GE reflux    Relevant Medications    montelukast (SINGULAIR) 10 mg tablet    Hand dermatitis    Relevant Medications    mupirocin (BACTROBAN) 2 % cream    High triglycerides    Relevant Orders    Lipid Panel with Direct LDL reflex    IFG (impaired fasting glucose)    Relevant Medications    montelukast (SINGULAIR) 10 mg tablet    Other Relevant Orders    Hemoglobin A1C    Medicare annual wellness visit, subsequent - Primary    Prostate cancer screening    Relevant Orders    PSA, Total Screen           Preventive health issues were discussed with patient, and age appropriate screening tests were ordered as noted in patient's After Visit Summary  Personalized health advice and appropriate referrals for health education or preventive services given if needed, as noted in patient's After Visit Summary       History of Present Illness:     Patient presents for Medicare Annual Wellness visit    Patient Care Team:  Adan Barbosa DO as PCP - MD Marcos Shannon MD Marcellus Mussel, MD Moses Jarvis, MD Dorice Ivy, DO     Problem List:     Patient Active Problem List   Diagnosis    Allergic rhinitis    Benign colon polyp    Benign essential hypertension    Benign prostatic hyperplasia    Cervical radiculopathy    Diverticulosis of large intestine without perforation or abscess without bleeding    GE reflux    Hiatal hernia    High triglycerides    Hyperlipidemia LDL goal <130    IFG (impaired fasting glucose)    Inguinal hernia    Internal hemorrhoids    Migraine without aura    Obesity (BMI 30-39  9)    Epidermoid cyst of skin    Situational anxiety    Spine disorder    BMI 30 0-30 9,adult    SOB (shortness of breath)    Prostate cancer screening    Hand dermatitis    Medicare annual wellness visit, subsequent      Past Medical and Surgical History:     Past Medical History:   Diagnosis Date    Arthritis     Chest pain 02/01/2006    Chronic pain     lower back    Closed fracture of one rib 07/27/2007    Diabetes mellitus ([address] )     boarderline diabetes    Environmental allergies     GERD (gastroesophageal reflux disease)     Hiatal hernia     Hyperlipidemia 01/05/2015    Hypertension     Migraine     Sebaceous cyst 01/11/2016     Past Surgical History:   Procedure Laterality Date    COLONOSCOPY        Family History:     Family History   Problem Relation Age of Onset    Arthritis Mother     Lung cancer Father     Asthma Brother       Social History:     Social History     Socioeconomic History    Marital status: Single     Spouse name: None    Number of children: None    Years of education: None    Highest education level: None   Occupational History    None   Social Needs    Financial resource strain: None    Food insecurity:     Worry: None     Inability: None    Transportation needs:     Medical: None     Non-medical: None   Tobacco Use    Smoking status: Passive Smoke Exposure - Never Smoker    Smokeless tobacco: Never Used   Substance and Sexual Activity    Alcohol use:  Yes     Alcohol/week: 4 0 standard drinks     Types: 4 Glasses of wine per week     Comment: social    Drug use: No    Sexual activity: None   Lifestyle    Physical activity:     Days per week: None     Minutes per session: None    Stress: None   Relationships    Social connections:     Talks on phone: None     Gets together: None     Attends [employer] service: None     Active member of club or organization: None     Attends meetings of clubs or organizations: None     Relationship status: None    Intimate partner violence:     Fear of current or ex partner: None     Emotionally abused: None     Physically abused: None     Forced sexual activity: None   Other Topics Concern    None   Social History Narrative    None       Medications and Allergies:     Current Outpatient Medications   Medication Sig Dispense Refill    acetaminophen (TYLENOL ARTHRITIS PAIN) 650 mg CR tablet Take 650 mg by mouth as needed        albuterol (PROVENTIL HFA,VENTOLIN HFA) 90 mcg/act inhaler Inhale 2 puffs every 6 (six) hours as needed for wheezing or shortness of breath (swish/spit after use) 1 Inhaler 5    Ascorbic Acid (VITAMIN C) 500 MG CAPS Take by mouth daily      fluticasone (FLONASE) 50 mcg/act nasal spray 1 spray into each nostril daily      Ibuprofen (ADVIL MIGRAINE) 200 MG CAPS Take 600 mg by mouth as needed      Magnesium Oxide 400 MG CAPS Take 400 mg by mouth      montelukast (SINGULAIR) 10 mg tablet Take 1 tablet (10 mg total) by mouth daily with breakfast 90 tablet 1    Multiple Vitamin (MULTI-DAY PO) Take 1 tablet by mouth daily      pantoprazole (PROTONIX) 40 mg tablet Take 1 tablet (40 mg total) by mouth daily 90 tablet 1    propranolol (INDERAL LA) 160 mg Take 1 capsule (160 mg total) by mouth daily 90 capsule 1    ramipril (ALTACE) 10 MG capsule Take 1 capsule (10 mg total) by mouth daily 90 capsule 1    SUMAtriptan (IMITREX) 100 mg tablet Take 1 tablet (100 mg total) by mouth once as needed for migraine for up to 1 dose 9 tablet 0    thiamine (VITAMIN B1) 100 mg tablet Take by mouth daily      verapamil (CALAN-SR) 180 mg CR tablet TAKE 1 TABLET (180 MG TOTAL) BY MOUTH DAILY AT BEDTIME 30 tablet 3    mupirocin (BACTROBAN) 2 % cream Apply topically 3 (three) times a day Prn, hands 30 g 5     No current facility-administered medications for this visit  Allergies   Allergen Reactions    Other Sneezing     SEASONAL       Immunizations: There is no immunization history on file for this patient  Health Maintenance:         Topic Date Due    Hepatitis C Screening  1950    CRC Screening: Colonoscopy  12/08/2027         Topic Date Due    DTaP,Tdap,and Td Vaccines (1 - Tdap) 02/11/1972    Pneumococcal Vaccine: 65+ Years (1 of 2 - PCV13) 02/11/2016    INFLUENZA VACCINE  07/01/2019      Medicare Health Risk Assessment:     /80   Pulse 57   Temp 98 °F (36 7 °C)   Resp 16   Ht 5' 1" (1 549 m)   Wt 77 6 kg (171 lb)   SpO2 94%   BMI 32 31 kg/m²      Vivek Ross is here for his Subsequent Wellness visit  Last Medicare Wellness visit information reviewed, patient interviewed and updates made to the record today  Health Risk Assessment:   Patient rates overall health as very good  Patient feels that their physical health rating is same  Eyesight was rated as same  Hearing was rated as same  Patient feels that their emotional and mental health rating is same  Pain experienced in the last 7 days has been none  Patient states that he has experienced no weight loss or gain in last 6 months  Depression Screening:   PHQ-2 Score: 0      Fall Risk Screening: In the past year, patient has experienced: no history of falling in past year      Home Safety:  Patient does not have trouble with stairs inside or outside of their home   Patient has working smoke alarms and has working carbon monoxide detector  Home safety hazards include: none  Nutrition:   Current diet is Regular, No Added Salt, Low Saturated Fat, Low Cholesterol and Frequent junk food  Medications:   Patient is not currently taking any over-the-counter supplements  Patient is able to manage medications  Activities of Daily Living (ADLs)/Instrumental Activities of Daily Living (IADLs):   Walk and transfer into and out of bed and chair?: Yes  Dress and groom yourself?: Yes    Bathe or shower yourself?: Yes    Feed yourself? Yes  Do your laundry/housekeeping?: Yes  Manage your money, pay your bills and track your expenses?: No  Make your own meals?: Yes    Do your own shopping?: Yes    Previous Hospitalizations:   Any hospitalizations or ED visits within the last 12 months?: Yes    How many hospitalizations have you had in the last year?: 1-2    Advance Care Planning:   Living will: Yes    Durable POA for healthcare: Yes    Advanced directive: Yes    End of Life Decisions reviewed with patient: Yes      Cognitive Screening:   Provider or family/friend/caregiver concerned regarding cognition?: No    PREVENTIVE SCREENINGS      Cardiovascular Screening:    General: Screening Not Indicated and History Lipid Disorder      Diabetes Screening:     General: Screening Current      Colorectal Cancer Screening:     General: Screening Current      Prostate Cancer Screening:    General: Screening Current      Osteoporosis Screening:    General: Screening Not Indicated      Abdominal Aortic Aneurysm (AAA) Screening:    Risk factors include: age between 73-69 yo        Lung Cancer Screening:     General: Risks and Benefits Discussed      Hepatitis C Screening:    General: Screening Not Indicated    Hep C Screening Accepted: No     Other Counseling Topics:   Car/seat belt/driving safety and regular weightbearing exercise         Josh Scruggs DO

## 2019-10-23 ENCOUNTER — OFFICE VISIT (OUTPATIENT)
Dept: NEUROLOGY | Facility: CLINIC | Age: 69
End: 2019-10-23
Payer: COMMERCIAL

## 2019-10-23 VITALS
DIASTOLIC BLOOD PRESSURE: 82 MMHG | HEIGHT: 61 IN | WEIGHT: 171 LBS | SYSTOLIC BLOOD PRESSURE: 127 MMHG | HEART RATE: 56 BPM | BODY MASS INDEX: 32.28 KG/M2

## 2019-10-23 DIAGNOSIS — G43.009 MIGRAINE WITHOUT AURA AND WITHOUT STATUS MIGRAINOSUS, NOT INTRACTABLE: Primary | ICD-10-CM

## 2019-10-23 PROCEDURE — 99214 OFFICE O/P EST MOD 30 MIN: CPT | Performed by: PSYCHIATRY & NEUROLOGY

## 2019-10-23 RX ORDER — SUMATRIPTAN 100 MG/1
100 TABLET, FILM COATED ORAL ONCE AS NEEDED
Qty: 9 TABLET | Refills: 0 | Status: SHIPPED | OUTPATIENT
Start: 2019-10-23 | End: 2020-10-26 | Stop reason: SDUPTHER

## 2019-10-23 NOTE — PROGRESS NOTES
Patient ID: Dimitri Reason is a 77 y o  male  Assessment/Plan:    Migraine without aura     Vascular headache    Plan-continue verapamil  once a day  Along with sumatriptan as a rescue medicine  Patient has a 6 Imitrex left and I have sent out 9 more tablet prescription to his pharmacy  Verapamil prescription has been sent out with 3 refills which will last him till next appointment  Inderal prescription has been given to him by Dr Suazo Gain    I will see the patient in 4 months  Subjective:    20-year-old man followed in the office for migraine without aura and also suffering with vascular headache secondary to fluctuating blood pressure  Patient was last seen in the office on May 22nd and since that time he has a total of 12 headaches and we are going by the number of the Imitrex that he has taken so far  Headaches are located in the left occipital head region in a patchy distribution described as a pressure pain and sometimes throbbing pain  He described the severity at scale 10 however taking the Imitrex and baby aspirin headache subside within 1 hour  He denies any visual aura or vertigo prior to the headache and also denies any nausea, vomiting or any light and noise sensitivity  Patient reported Imitrex works even when headache is at scale 10  Patient denies other neurological symptoms upon asking each in detail and they are as follows  Pt denies double vision, blurred vision, transient monocular blindness, vertigo, tinnitus, hearing loss, slurred speech, difficulty expressing and understanding, dysphasia, and focal weakness, numbness, imbalance and incoordination  Pt denies bladder and bowel urgency, frequency and incontinence         Past Medical History:   Diagnosis Date    Arthritis     Chest pain 02/01/2006    Chronic pain     lower back    Closed fracture of one rib 07/27/2007    Diabetes mellitus ([facility] [address] )     boarderline diabetes    Environmental allergies  GERD (gastroesophageal reflux disease)     Hiatal hernia     Hyperlipidemia 01/05/2015    Hypertension     Migraine     Sebaceous cyst 01/11/2016       Family History   Problem Relation Age of Onset    Arthritis Mother     Lung cancer Father     Asthma Brother    ,    Social History     Socioeconomic History    Marital status: Single     Spouse name: Not on file    Number of children: Not on file    Years of education: Not on file    Highest education level: Not on file   Occupational History    Not on file   Social Needs    Financial resource strain: Not on file    Food insecurity:     Worry: Not on file     Inability: Not on file    Transportation needs:     Medical: Not on file     Non-medical: Not on file   Tobacco Use    Smoking status: Passive Smoke Exposure - Never Smoker    Smokeless tobacco: Never Used   Substance and Sexual Activity    Alcohol use:  Yes     Alcohol/week: 4 0 standard drinks     Types: 4 Glasses of wine per week     Comment: social    Drug use: No    Sexual activity: Not on file   Lifestyle    Physical activity:     Days per week: Not on file     Minutes per session: Not on file    Stress: Not on file   Relationships    Social connections:     Talks on phone: Not on file     Gets together: Not on file     Attends [employer] service: Not on file     Active member of club or organization: Not on file     Attends meetings of clubs or organizations: Not on file     Relationship status: Not on file    Intimate partner violence:     Fear of current or ex partner: Not on file     Emotionally abused: Not on file     Physically abused: Not on file     Forced sexual activity: Not on file   Other Topics Concern    Not on file   Social History Narrative    Not on file       Current Outpatient Medications on File Prior to Visit   Medication Sig Dispense Refill    acetaminophen (TYLENOL ARTHRITIS PAIN) 650 mg CR tablet Take 650 mg by mouth as needed        albuterol (PROVENTIL HFA,VENTOLIN HFA) 90 mcg/act inhaler Inhale 2 puffs every 6 (six) hours as needed for wheezing or shortness of breath (swish/spit after use) 1 Inhaler 5    Ascorbic Acid (VITAMIN C) 500 MG CAPS Take by mouth daily      fluticasone (FLONASE) 50 mcg/act nasal spray 1 spray into each nostril daily      Ibuprofen (ADVIL MIGRAINE) 200 MG CAPS Take 600 mg by mouth as needed      Magnesium Oxide 400 MG CAPS Take 400 mg by mouth      montelukast (SINGULAIR) 10 mg tablet Take 1 tablet (10 mg total) by mouth daily with breakfast 90 tablet 1    Multiple Vitamin (MULTI-DAY PO) Take 1 tablet by mouth daily      mupirocin (BACTROBAN) 2 % cream Apply topically 3 (three) times a day Prn, hands 30 g 5    pantoprazole (PROTONIX) 40 mg tablet Take 1 tablet (40 mg total) by mouth daily 90 tablet 1    propranolol (INDERAL LA) 160 mg Take 1 capsule (160 mg total) by mouth daily 90 capsule 1    ramipril (ALTACE) 10 MG capsule Take 1 capsule (10 mg total) by mouth daily 90 capsule 1    thiamine (VITAMIN B1) 100 mg tablet Take by mouth daily      [DISCONTINUED] SUMAtriptan (IMITREX) 100 mg tablet Take 1 tablet (100 mg total) by mouth once as needed for migraine for up to 1 dose 9 tablet 0    [DISCONTINUED] verapamil (CALAN-SR) 180 mg CR tablet TAKE 1 TABLET (180 MG TOTAL) BY MOUTH DAILY AT BEDTIME 30 tablet 3     No current facility-administered medications on file prior to visit  Objective:    Blood pressure 127/82, pulse 56, height 5' 1" (1 549 m), weight 77 6 kg (171 lb)  Physical Exam   Eyes: Pupils are equal, round, and reactive to light  EOM are normal    Neck: Normal carotid pulses present  Carotid bruit is not present  Neurological: He has normal strength and normal reflexes  Psychiatric: His speech is normal        Neurological Exam  Mental Status   Oriented to person, place, time and situation  Recent and remote memory are intact   Speech is normal  Language is fluent with no aphasia  Cranial Nerves  CN II: Visual fields full to confrontation  CN III, IV, VI: Extraocular movements intact bilaterally  Pupils equal round and reactive to light bilaterally  CN V: Facial sensation is normal   CN VII: Full and symmetric facial movement  CN VIII: Hearing is normal   CN IX, X: Palate elevates symmetrically  Normal gag reflex  CN XI: Shoulder shrug strength is normal   CN XII: Tongue midline without atrophy or fasciculations  Motor  Normal muscle bulk throughout  No fasciculations present  Normal muscle tone  Strength is 5/5 throughout all four extremities  Sensory  Sensation is intact to light touch, pinprick, vibration and proprioception in all four extremities  Light touch is normal in upper and lower extremities  Pinprick is normal in upper and lower extremities  Proprioception is normal in upper and lower extremities  Reflexes  Deep tendon reflexes are 2+ and symmetric in all four extremities with downgoing toes bilaterally  Coordination  Right: Finger-to-nose normal  Heel-to-shin normal   Left: Finger-to-nose normal  Heel-to-shin normal     Gait  Normal casual, toe, heel and tandem gait  ROS:    Review of Systems   Constitutional: Negative  HENT: Negative  Eyes: Negative  Respiratory: Negative  Cardiovascular: Negative  Gastrointestinal: Negative  Endocrine: Negative  Genitourinary: Negative  Musculoskeletal: Negative  Skin: Negative  Allergic/Immunologic: Negative  Neurological: Negative  Hematological: Negative  Psychiatric/Behavioral: Negative

## 2019-10-29 ENCOUNTER — TELEPHONE (OUTPATIENT)
Dept: FAMILY MEDICINE CLINIC | Facility: CLINIC | Age: 69
End: 2019-10-29

## 2019-10-29 DIAGNOSIS — Z11.1 SCREENING-PULMONARY TB: Primary | ICD-10-CM

## 2019-10-29 NOTE — TELEPHONE ENCOUNTER
Thank you   Tues NOV 5 appointment at [address] [place] [address]    Please order    Sandoval Traylor MA

## 2019-11-05 ENCOUNTER — CLINICAL SUPPORT (OUTPATIENT)
Dept: FAMILY MEDICINE CLINIC | Facility: CLINIC | Age: 69
End: 2019-11-05
Payer: COMMERCIAL

## 2019-11-05 DIAGNOSIS — Z23 NEED FOR VACCINATION: Primary | ICD-10-CM

## 2019-11-05 PROCEDURE — 86580 TB INTRADERMAL TEST: CPT

## 2019-11-07 ENCOUNTER — CLINICAL SUPPORT (OUTPATIENT)
Dept: FAMILY MEDICINE CLINIC | Facility: CLINIC | Age: 69
End: 2019-11-07

## 2019-11-07 ENCOUNTER — TELEPHONE (OUTPATIENT)
Dept: FAMILY MEDICINE CLINIC | Facility: CLINIC | Age: 69
End: 2019-11-07

## 2019-11-07 DIAGNOSIS — Z11.1 ENCOUNTER FOR PPD SKIN TEST READING: Primary | ICD-10-CM

## 2019-11-07 LAB
INDURATION: 0 MM
TB SKIN TEST: NEGATIVE

## 2019-11-07 NOTE — TELEPHONE ENCOUNTER
Pt sister is requesting pt get a refill of Tramadol sent to [facility]  Pls call her when ready, Cintia Elizondo

## 2019-11-08 NOTE — TELEPHONE ENCOUNTER
At visit with patient (and sister) on 10/21/19, she related that he was not on Tramadol anymore and I agreed since it is not recommended for his age and there is potential interaction with his imitrex  If she wants him to have a refill, then I will give him a refill but then he will need to be referred to pain management for this or another option due to opioid safety rules

## 2019-11-08 NOTE — TELEPHONE ENCOUNTER
Spoke with pt's sister Consuelo Arevalo, in agreement with Dr Marcella Adkins  And will make pt aware of note and if pain continues , they will call back for referral for pain management     Dustin Hyatt MA

## 2019-12-19 DIAGNOSIS — G43.009 MIGRAINE WITHOUT AURA AND WITHOUT STATUS MIGRAINOSUS, NOT INTRACTABLE: ICD-10-CM

## 2019-12-19 RX ORDER — SUMATRIPTAN 100 MG/1
100 TABLET, FILM COATED ORAL ONCE AS NEEDED
Qty: 9 TABLET | Refills: 0 | Status: SHIPPED | OUTPATIENT
Start: 2019-12-19 | End: 2020-06-02 | Stop reason: SDUPTHER

## 2020-02-19 ENCOUNTER — OFFICE VISIT (OUTPATIENT)
Dept: NEUROLOGY | Facility: CLINIC | Age: 70
End: 2020-02-19
Payer: COMMERCIAL

## 2020-02-19 VITALS
BODY MASS INDEX: 33.42 KG/M2 | HEART RATE: 100 BPM | HEIGHT: 61 IN | SYSTOLIC BLOOD PRESSURE: 124 MMHG | WEIGHT: 177 LBS | DIASTOLIC BLOOD PRESSURE: 90 MMHG

## 2020-02-19 DIAGNOSIS — G43.009 MIGRAINE WITHOUT AURA AND WITHOUT STATUS MIGRAINOSUS, NOT INTRACTABLE: Primary | ICD-10-CM

## 2020-02-19 PROCEDURE — 99214 OFFICE O/P EST MOD 30 MIN: CPT | Performed by: PSYCHIATRY & NEUROLOGY

## 2020-02-19 PROCEDURE — 3008F BODY MASS INDEX DOCD: CPT | Performed by: PSYCHIATRY & NEUROLOGY

## 2020-02-19 PROCEDURE — 1160F RVW MEDS BY RX/DR IN RCRD: CPT | Performed by: PSYCHIATRY & NEUROLOGY

## 2020-02-19 PROCEDURE — 1036F TOBACCO NON-USER: CPT | Performed by: PSYCHIATRY & NEUROLOGY

## 2020-02-19 PROCEDURE — 3080F DIAST BP >= 90 MM HG: CPT | Performed by: PSYCHIATRY & NEUROLOGY

## 2020-02-19 PROCEDURE — 3074F SYST BP LT 130 MM HG: CPT | Performed by: PSYCHIATRY & NEUROLOGY

## 2020-02-19 NOTE — PROGRESS NOTES
Patient ID: Dimitri Reason is a 72 y o  male  Assessment/Plan:    Migraine without aura    Vascular headache    Plan patient to restart it verapamil which she had stopped sometime in December    Patient to also restart propranolol, also stopped in December  Patient has 1 refill left on each of  I have sent out verapamil prescription with 2 refill dated March 18, 2020  Patient has a 9 tablet of Imitrex left    I will see the patient in 4 months  I have advised patient any sister that I will be retiring sometime in end of the June  Subjective:    15-year-old male last seen in the office on October 2019  He has been followed in the office for vascular headache and migraine without aura  Patient reported has no headache in month of November however December he had 2 headache, January he has a 4 headache and in February so far he has 3 headaches  In January and February patient reported 5/6 headaches were at severity scale 10 and 1 headache was at scale 5  As per sister patient keeps his hours temperature high however patient reported he keeps only at 72  Headaches are located on the left posterior head region described as a throbbing pain and at times they are all over generalized  Headache is not associated with the nausea, vomiting, photophobia or phonophobia  Patient apparently has not been drinking enough fluid  Patient reported that change in the weather of sudden nature may affect his the frequency and severity of the headache  Patient denies any other neurological symptoms upon asking each in detail and they are as follows      Pt denies double vision, blurred vision, transient monocular blindness, vertigo, tinnitus, hearing loss, slurred speech, difficulty expressing and understanding, dysphasia, and focal weakness, numbness, imbalance and incoordination  Pt denies bladder and bowel urgency, frequency and incontinence         Past Medical History:   Diagnosis Date    Arthritis  Chest pain 02/01/2006    Chronic pain     lower back    Closed fracture of one rib 07/27/2007    Diabetes mellitus ([address] )     boarderline diabetes    Environmental allergies     GERD (gastroesophageal reflux disease)     Hiatal hernia     Hyperlipidemia 01/05/2015    Hypertension     Migraine     Sebaceous cyst 01/11/2016       Family History   Problem Relation Age of Onset    Arthritis Mother     Lung cancer Father     Asthma Brother    ,    Social History     Socioeconomic History    Marital status: Single     Spouse name: Not on file    Number of children: Not on file    Years of education: Not on file    Highest education level: Not on file   Occupational History    Not on file   Social Needs    Financial resource strain: Not on file    Food insecurity:     Worry: Not on file     Inability: Not on file    Transportation needs:     Medical: Not on file     Non-medical: Not on file   Tobacco Use    Smoking status: Passive Smoke Exposure - Never Smoker    Smokeless tobacco: Never Used   Substance and Sexual Activity    Alcohol use:  Yes     Alcohol/week: 4 0 standard drinks     Types: 4 Glasses of wine per week     Comment: social    Drug use: No    Sexual activity: Not on file   Lifestyle    Physical activity:     Days per week: Not on file     Minutes per session: Not on file    Stress: Not on file   Relationships    Social connections:     Talks on phone: Not on file     Gets together: Not on file     Attends L[employer] service: Not on file     Active member of club or organization: Not on file     Attends meetings of clubs or organizations: Not on file     Relationship status: Not on file    Intimate partner violence:     Fear of current or ex partner: Not on file     Emotionally abused: Not on file     Physically abused: Not on file     Forced sexual activity: Not on file   Other Topics Concern    Not on file   Social History Narrative    Not on file       Current Outpatient Medications on File Prior to Visit   Medication Sig Dispense Refill    acetaminophen (TYLENOL ARTHRITIS PAIN) 650 mg CR tablet Take 650 mg by mouth as needed        albuterol (PROVENTIL HFA,VENTOLIN HFA) 90 mcg/act inhaler Inhale 2 puffs every 6 (six) hours as needed for wheezing or shortness of breath (swish/spit after use) 1 Inhaler 5    Ascorbic Acid (VITAMIN C) 500 MG CAPS Take by mouth daily      fluticasone (FLONASE) 50 mcg/act nasal spray 1 spray into each nostril daily      Magnesium Oxide 400 MG CAPS Take 400 mg by mouth      montelukast (SINGULAIR) 10 mg tablet Take 1 tablet (10 mg total) by mouth daily with breakfast 90 tablet 1    Multiple Vitamin (MULTI-DAY PO) Take 1 tablet by mouth daily      mupirocin (BACTROBAN) 2 % cream Apply topically 3 (three) times a day Prn, hands 30 g 5    pantoprazole (PROTONIX) 40 mg tablet Take 1 tablet (40 mg total) by mouth daily 90 tablet 1    propranolol (INDERAL LA) 160 mg Take 1 capsule (160 mg total) by mouth daily 90 capsule 1    ramipril (ALTACE) 10 MG capsule Take 1 capsule (10 mg total) by mouth daily 90 capsule 1    SUMAtriptan (IMITREX) 100 mg tablet TAKE 1 TABLET (100 MG TOTAL) BY MOUTH ONCE AS NEEDED FOR MIGRAINE FOR UP TO 1 DOSE 9 tablet 0    thiamine (VITAMIN B1) 100 mg tablet Take by mouth daily      Ibuprofen (ADVIL MIGRAINE) 200 MG CAPS Take 600 mg by mouth as needed      SUMAtriptan (IMITREX) 100 mg tablet Take 1 tablet (100 mg total) by mouth once as needed for migraine for up to 1 dose (Patient not taking: Reported on 2/19/2020) 9 tablet 0    verapamil (CALAN-SR) 180 mg CR tablet Take 1 tablet (180 mg total) by mouth daily at bedtime (Patient not taking: Reported on 2/19/2020) 30 tablet 3     No current facility-administered medications on file prior to visit  Objective:    Blood pressure 124/90, pulse 100, height 5' 1" (1 549 m), weight 80 3 kg (177 lb)      Physical Exam   Eyes: Pupils are equal, round, and reactive to light  EOM are normal    Neck: Normal carotid pulses present  Carotid bruit is not present  Neurological: He has normal strength and normal reflexes  Psychiatric: His speech is normal        Neurological Exam  Mental Status   Oriented to person, place, time and situation  Recent and remote memory are intact  Speech is normal  Language is fluent with no aphasia  Cranial Nerves  CN II: Visual fields full to confrontation  CN III, IV, VI: Extraocular movements intact bilaterally  Pupils equal round and reactive to light bilaterally  CN V: Facial sensation is normal   CN VII: Full and symmetric facial movement  CN VIII: Hearing is normal   CN IX, X: Palate elevates symmetrically  Normal gag reflex  CN XI: Shoulder shrug strength is normal   CN XII: Tongue midline without atrophy or fasciculations  Motor  Normal muscle bulk throughout  No fasciculations present  Normal muscle tone  Strength is 5/5 throughout all four extremities  Sensory  Sensation is intact to light touch, pinprick, vibration and proprioception in all four extremities  Light touch is normal in upper and lower extremities  Pinprick is normal in upper and lower extremities  Proprioception is normal in upper and lower extremities  Reflexes  Deep tendon reflexes are 2+ and symmetric in all four extremities with downgoing toes bilaterally  Coordination  Right: Finger-to-nose normal  Heel-to-shin normal   Left: Finger-to-nose normal  Heel-to-shin normal     Gait  Normal casual, toe, heel and tandem gait  ROS:    Review of Systems   Constitutional: Negative  HENT: Negative  Eyes: Negative  Respiratory: Negative  Cardiovascular: Negative  Gastrointestinal: Negative  Endocrine: Negative  Genitourinary: Negative  Musculoskeletal: Negative  Skin: Negative  Allergic/Immunologic: Negative  Neurological: Negative  Hematological: Negative  Psychiatric/Behavioral: Negative

## 2020-04-24 DIAGNOSIS — I10 BENIGN ESSENTIAL HYPERTENSION: ICD-10-CM

## 2020-04-24 DIAGNOSIS — G43.009 MIGRAINE WITHOUT AURA AND WITHOUT STATUS MIGRAINOSUS, NOT INTRACTABLE: ICD-10-CM

## 2020-04-24 DIAGNOSIS — K21.9 GASTROESOPHAGEAL REFLUX DISEASE, ESOPHAGITIS PRESENCE NOT SPECIFIED: ICD-10-CM

## 2020-04-24 DIAGNOSIS — L30.9 HAND DERMATITIS: ICD-10-CM

## 2020-04-24 DIAGNOSIS — K21.9 GASTROESOPHAGEAL REFLUX DISEASE WITHOUT ESOPHAGITIS: ICD-10-CM

## 2020-04-24 DIAGNOSIS — R73.01 IFG (IMPAIRED FASTING GLUCOSE): ICD-10-CM

## 2020-04-24 RX ORDER — RAMIPRIL 10 MG/1
10 CAPSULE ORAL DAILY
Qty: 90 CAPSULE | Refills: 1 | Status: SHIPPED | OUTPATIENT
Start: 2020-04-24 | End: 2020-07-20 | Stop reason: SDUPTHER

## 2020-04-24 RX ORDER — PROPRANOLOL HYDROCHLORIDE 160 MG/1
160 CAPSULE, EXTENDED RELEASE ORAL DAILY
Qty: 90 CAPSULE | Refills: 1 | Status: SHIPPED | OUTPATIENT
Start: 2020-04-24 | End: 2020-12-03 | Stop reason: SDUPTHER

## 2020-04-24 RX ORDER — MONTELUKAST SODIUM 10 MG/1
10 TABLET ORAL
Qty: 90 TABLET | Refills: 1 | Status: SHIPPED | OUTPATIENT
Start: 2020-04-24 | End: 2020-10-28

## 2020-04-24 RX ORDER — PANTOPRAZOLE SODIUM 40 MG/1
40 TABLET, DELAYED RELEASE ORAL DAILY
Qty: 90 TABLET | Refills: 1 | Status: SHIPPED | OUTPATIENT
Start: 2020-04-24 | End: 2021-06-04

## 2020-05-21 ENCOUNTER — TELEPHONE (OUTPATIENT)
Dept: FAMILY MEDICINE CLINIC | Facility: CLINIC | Age: 70
End: 2020-05-21

## 2020-06-02 ENCOUNTER — TELEPHONE (OUTPATIENT)
Dept: FAMILY MEDICINE CLINIC | Facility: CLINIC | Age: 70
End: 2020-06-02

## 2020-06-02 ENCOUNTER — OFFICE VISIT (OUTPATIENT)
Dept: FAMILY MEDICINE CLINIC | Facility: CLINIC | Age: 70
End: 2020-06-02
Payer: COMMERCIAL

## 2020-06-02 VITALS
BODY MASS INDEX: 33.68 KG/M2 | DIASTOLIC BLOOD PRESSURE: 80 MMHG | HEIGHT: 62 IN | WEIGHT: 183 LBS | TEMPERATURE: 98.3 F | OXYGEN SATURATION: 95 % | SYSTOLIC BLOOD PRESSURE: 146 MMHG | HEART RATE: 57 BPM | RESPIRATION RATE: 16 BRPM

## 2020-06-02 DIAGNOSIS — K21.9 GASTROESOPHAGEAL REFLUX DISEASE, ESOPHAGITIS PRESENCE NOT SPECIFIED: ICD-10-CM

## 2020-06-02 DIAGNOSIS — J30.9 ALLERGIC RHINITIS, UNSPECIFIED SEASONALITY, UNSPECIFIED TRIGGER: ICD-10-CM

## 2020-06-02 DIAGNOSIS — R06.02 SOB (SHORTNESS OF BREATH): ICD-10-CM

## 2020-06-02 DIAGNOSIS — L30.9 HAND DERMATITIS: ICD-10-CM

## 2020-06-02 DIAGNOSIS — I10 BENIGN ESSENTIAL HYPERTENSION: Primary | ICD-10-CM

## 2020-06-02 DIAGNOSIS — R06.00 DOE (DYSPNEA ON EXERTION): ICD-10-CM

## 2020-06-02 DIAGNOSIS — K40.21 BILATERAL RECURRENT INGUINAL HERNIA WITHOUT OBSTRUCTION OR GANGRENE: ICD-10-CM

## 2020-06-02 DIAGNOSIS — R73.03 PREDIABETES: ICD-10-CM

## 2020-06-02 PROBLEM — R06.09 DOE (DYSPNEA ON EXERTION): Status: ACTIVE | Noted: 2020-06-02

## 2020-06-02 PROCEDURE — 99214 OFFICE O/P EST MOD 30 MIN: CPT | Performed by: FAMILY MEDICINE

## 2020-06-02 PROCEDURE — 3077F SYST BP >= 140 MM HG: CPT | Performed by: FAMILY MEDICINE

## 2020-06-02 PROCEDURE — 3008F BODY MASS INDEX DOCD: CPT | Performed by: FAMILY MEDICINE

## 2020-06-02 PROCEDURE — 1160F RVW MEDS BY RX/DR IN RCRD: CPT | Performed by: FAMILY MEDICINE

## 2020-06-02 PROCEDURE — 1036F TOBACCO NON-USER: CPT | Performed by: FAMILY MEDICINE

## 2020-06-02 PROCEDURE — 3079F DIAST BP 80-89 MM HG: CPT | Performed by: FAMILY MEDICINE

## 2020-06-02 RX ORDER — ALBUTEROL SULFATE 90 UG/1
2 AEROSOL, METERED RESPIRATORY (INHALATION) EVERY 6 HOURS PRN
Qty: 1 INHALER | Refills: 5 | Status: SHIPPED | OUTPATIENT
Start: 2020-06-02 | End: 2020-06-08 | Stop reason: SDUPTHER

## 2020-06-02 RX ORDER — ASPIRIN 81 MG/1
81 TABLET ORAL DAILY
COMMUNITY

## 2020-06-02 RX ORDER — MUPIROCIN CALCIUM 20 MG/G
CREAM TOPICAL 3 TIMES DAILY
Qty: 30 G | Refills: 5 | Status: SHIPPED | OUTPATIENT
Start: 2020-06-02

## 2020-06-03 LAB
ALBUMIN SERPL-MCNC: 4.1 G/DL (ref 3.8–4.8)
ALBUMIN/GLOB SERPL: 1.9 {RATIO} (ref 1.2–2.2)
ALP SERPL-CCNC: 95 IU/L (ref 39–117)
ALT SERPL-CCNC: 37 IU/L (ref 0–44)
AST SERPL-CCNC: 25 IU/L (ref 0–40)
BILIRUB SERPL-MCNC: 0.2 MG/DL (ref 0–1.2)
BUN SERPL-MCNC: 27 MG/DL (ref 8–27)
BUN/CREAT SERPL: 23 (ref 10–24)
CALCIUM SERPL-MCNC: 9.2 MG/DL (ref 8.6–10.2)
CHLORIDE SERPL-SCNC: 103 MMOL/L (ref 96–106)
CHOLEST SERPL-MCNC: 253 MG/DL (ref 100–199)
CO2 SERPL-SCNC: 21 MMOL/L (ref 20–29)
CREAT SERPL-MCNC: 1.19 MG/DL (ref 0.76–1.27)
GLOBULIN SER-MCNC: 2.2 G/DL (ref 1.5–4.5)
GLUCOSE SERPL-MCNC: 129 MG/DL (ref 65–99)
HBA1C MFR BLD: 6.1 % (ref 4.8–5.6)
HDLC SERPL-MCNC: 33 MG/DL
LDLC SERPL CALC-MCNC: ABNORMAL MG/DL (ref 0–99)
LDLC SERPL DIRECT ASSAY-MCNC: 100 MG/DL (ref 0–99)
MICRODELETION SYND BLD/T FISH: NORMAL
POTASSIUM SERPL-SCNC: 4.2 MMOL/L (ref 3.5–5.2)
PROT SERPL-MCNC: 6.3 G/DL (ref 6–8.5)
PSA SERPL-MCNC: 3.4 NG/ML (ref 0–4)
SL AMB EGFR AFRICAN AMERICAN: 72 ML/MIN/1.73
SL AMB EGFR NON AFRICAN AMERICAN: 62 ML/MIN/1.73
SODIUM SERPL-SCNC: 139 MMOL/L (ref 134–144)
TRIGL SERPL-MCNC: 767 MG/DL (ref 0–149)

## 2020-06-08 DIAGNOSIS — R06.02 SOB (SHORTNESS OF BREATH): ICD-10-CM

## 2020-06-09 RX ORDER — ALBUTEROL SULFATE 90 UG/1
2 AEROSOL, METERED RESPIRATORY (INHALATION) EVERY 6 HOURS PRN
Qty: 1 INHALER | Refills: 5 | Status: SHIPPED | OUTPATIENT
Start: 2020-06-09 | End: 2021-09-05

## 2020-06-24 ENCOUNTER — TELEPHONE (OUTPATIENT)
Dept: NEUROLOGY | Facility: CLINIC | Age: 70
End: 2020-06-24

## 2020-06-25 ENCOUNTER — OFFICE VISIT (OUTPATIENT)
Dept: NEUROLOGY | Facility: CLINIC | Age: 70
End: 2020-06-25
Payer: COMMERCIAL

## 2020-06-25 VITALS
DIASTOLIC BLOOD PRESSURE: 94 MMHG | TEMPERATURE: 98.5 F | HEART RATE: 53 BPM | WEIGHT: 183 LBS | SYSTOLIC BLOOD PRESSURE: 170 MMHG | HEIGHT: 62 IN | BODY MASS INDEX: 33.68 KG/M2

## 2020-06-25 DIAGNOSIS — G43.009 MIGRAINE WITHOUT AURA AND WITHOUT STATUS MIGRAINOSUS, NOT INTRACTABLE: Primary | ICD-10-CM

## 2020-06-25 DIAGNOSIS — I10 BENIGN ESSENTIAL HYPERTENSION: ICD-10-CM

## 2020-06-25 PROCEDURE — 3080F DIAST BP >= 90 MM HG: CPT | Performed by: PSYCHIATRY & NEUROLOGY

## 2020-06-25 PROCEDURE — 3077F SYST BP >= 140 MM HG: CPT | Performed by: PSYCHIATRY & NEUROLOGY

## 2020-06-25 PROCEDURE — 99214 OFFICE O/P EST MOD 30 MIN: CPT | Performed by: PSYCHIATRY & NEUROLOGY

## 2020-06-25 PROCEDURE — 1160F RVW MEDS BY RX/DR IN RCRD: CPT | Performed by: PSYCHIATRY & NEUROLOGY

## 2020-06-25 PROCEDURE — 3008F BODY MASS INDEX DOCD: CPT | Performed by: PSYCHIATRY & NEUROLOGY

## 2020-06-25 PROCEDURE — 1036F TOBACCO NON-USER: CPT | Performed by: PSYCHIATRY & NEUROLOGY

## 2020-07-20 ENCOUNTER — TELEPHONE (OUTPATIENT)
Dept: FAMILY MEDICINE CLINIC | Facility: CLINIC | Age: 70
End: 2020-07-20

## 2020-07-20 DIAGNOSIS — M54.50 CHRONIC LOW BACK PAIN, UNSPECIFIED BACK PAIN LATERALITY, UNSPECIFIED WHETHER SCIATICA PRESENT: Primary | ICD-10-CM

## 2020-07-20 DIAGNOSIS — I10 BENIGN ESSENTIAL HYPERTENSION: ICD-10-CM

## 2020-07-20 DIAGNOSIS — G89.29 CHRONIC LOW BACK PAIN, UNSPECIFIED BACK PAIN LATERALITY, UNSPECIFIED WHETHER SCIATICA PRESENT: Primary | ICD-10-CM

## 2020-07-20 RX ORDER — RAMIPRIL 10 MG/1
10 CAPSULE ORAL 2 TIMES DAILY
Qty: 180 CAPSULE | Refills: 1 | Status: SHIPPED | OUTPATIENT
Start: 2020-07-20 | End: 2020-08-19 | Stop reason: SDUPTHER

## 2020-07-20 RX ORDER — GABAPENTIN 300 MG/1
300 CAPSULE ORAL
Qty: 90 CAPSULE | Refills: 1 | Status: SHIPPED | OUTPATIENT
Start: 2020-07-20 | End: 2020-12-03 | Stop reason: SDUPTHER

## 2020-07-20 NOTE — TELEPHONE ENCOUNTER
S/w sister  bp high , pulse ok    Increase ramipril 10mg/d to 10mg bid    Low back pain, jackson stiff in am, radiates to legs  Avoid going back to tramadol    Try gabapentin 300mg qhs    F/u 1m

## 2020-07-20 NOTE — TELEPHONE ENCOUNTER
I do not see Gabapentin in his medication list   Please advise   Patient sister requesting a call back  Rachel Curiel

## 2020-07-20 NOTE — TELEPHONE ENCOUNTER
Sister, Pillo Frances would like to Speak with Dr Micki Omalley regarding his blood pressure  She states that it has been running higher than normal lately  She also wants to get a prescription of gabapentin  She stated Dr Rome Diaz would talk to her if she called office    Please call Pillo Frances back  Thank you

## 2020-08-19 ENCOUNTER — TELEPHONE (OUTPATIENT)
Dept: PHYSICAL THERAPY | Facility: OTHER | Age: 70
End: 2020-08-19

## 2020-08-19 ENCOUNTER — OFFICE VISIT (OUTPATIENT)
Dept: FAMILY MEDICINE CLINIC | Facility: CLINIC | Age: 70
End: 2020-08-19
Payer: COMMERCIAL

## 2020-08-19 VITALS
BODY MASS INDEX: 33.86 KG/M2 | HEART RATE: 67 BPM | HEIGHT: 62 IN | TEMPERATURE: 98.5 F | SYSTOLIC BLOOD PRESSURE: 134 MMHG | WEIGHT: 184 LBS | DIASTOLIC BLOOD PRESSURE: 70 MMHG | OXYGEN SATURATION: 97 % | RESPIRATION RATE: 24 BRPM

## 2020-08-19 DIAGNOSIS — K40.90 RIGHT INGUINAL HERNIA: ICD-10-CM

## 2020-08-19 DIAGNOSIS — R73.09 ABNORMAL GLUCOSE: ICD-10-CM

## 2020-08-19 DIAGNOSIS — M53.9 SPINE DISORDER: ICD-10-CM

## 2020-08-19 DIAGNOSIS — G43.009 MIGRAINE WITHOUT AURA AND WITHOUT STATUS MIGRAINOSUS, NOT INTRACTABLE: ICD-10-CM

## 2020-08-19 DIAGNOSIS — I10 BENIGN ESSENTIAL HYPERTENSION: Primary | ICD-10-CM

## 2020-08-19 DIAGNOSIS — B35.4 TINEA CORPORIS: ICD-10-CM

## 2020-08-19 PROCEDURE — 1160F RVW MEDS BY RX/DR IN RCRD: CPT | Performed by: FAMILY MEDICINE

## 2020-08-19 PROCEDURE — 3078F DIAST BP <80 MM HG: CPT | Performed by: FAMILY MEDICINE

## 2020-08-19 PROCEDURE — 3075F SYST BP GE 130 - 139MM HG: CPT | Performed by: FAMILY MEDICINE

## 2020-08-19 PROCEDURE — 1036F TOBACCO NON-USER: CPT | Performed by: FAMILY MEDICINE

## 2020-08-19 PROCEDURE — 3008F BODY MASS INDEX DOCD: CPT | Performed by: FAMILY MEDICINE

## 2020-08-19 PROCEDURE — 99214 OFFICE O/P EST MOD 30 MIN: CPT | Performed by: FAMILY MEDICINE

## 2020-08-19 RX ORDER — RAMIPRIL 10 MG/1
20 CAPSULE ORAL 2 TIMES DAILY
Qty: 180 CAPSULE | Refills: 1
Start: 2020-08-19 | End: 2020-10-14

## 2020-08-19 RX ORDER — CLOTRIMAZOLE AND BETAMETHASONE DIPROPIONATE 10; .64 MG/G; MG/G
CREAM TOPICAL 2 TIMES DAILY
Qty: 45 G | Refills: 0 | Status: SHIPPED | OUTPATIENT
Start: 2020-08-19

## 2020-08-19 NOTE — TELEPHONE ENCOUNTER
Call placed to the patient per [facility] referral     Phone rings with no answer or option fro voice mail  This is the 1st attempt to reach the patient  Will defer per protocol

## 2020-08-19 NOTE — PROGRESS NOTES
Assessment/Plan:    No problem-specific Assessment & Plan notes found for this encounter  htn better on altace 20mg/d  Needs to work on diet and weight by next labs due to elevated glucose    PT for back pain issues and HEP and hopefully wt loss, cont gabapentin 300mg qhs for now    Symptomatic RIH, offer surgeon eval/referral    Migraines stable    Right wrist tinea corporis, try lotrisone, f/u if no better     Diagnoses and all orders for this visit:    Benign essential hypertension  -     ramipril (ALTACE) 10 MG capsule; Take 2 capsules (20 mg total) by mouth 2 (two) times a day    Spine disorder  -     Ambulatory Referral to [facility]; Future    Abnormal glucose    Migraine without aura and without status migrainosus, not intractable    Tinea corporis  -     clotrimazole-betamethasone (LOTRISONE) 1-0 05 % cream; Apply topically 2 (two) times a day Short term, right arm    Right inguinal hernia  -     Ambulatory referral to General Surgery; Future          Return for Next scheduled follow up  Subjective:      Patient ID: Maya Nielsen is a 72 y o  male  Chief Complaint   Patient presents with    Follow-up     follow up on meds [id number]        HPI  Taking meds  bp med increased since was high  Headaches stable, infrequent, Dr Sammie Ott retired, did see PA there  Ramipril changed from 10mg to 20mg as 10mg bid  bp improved but not sure  No cough or leg swelling  Not much exercise  Sits often    Back pain issues  Stiff in am    Months, right forearm, itchy, persistent, enlarging    The following portions of the patient's history were reviewed and updated as appropriate: allergies, current medications, past family history, past medical history, past social history, past surgical history and problem list     Review of Systems   Constitutional: Negative for fever  Cardiovascular: Negative for chest pain           Current Outpatient Medications   Medication Sig Dispense Refill    acetaminophen (TYLENOL ARTHRITIS PAIN) 650 mg CR tablet Take 650 mg by mouth as needed        albuterol (PROVENTIL HFA,VENTOLIN HFA) 90 mcg/act inhaler Inhale 2 puffs every 6 (six) hours as needed for wheezing or shortness of breath (swish/spit after use) 1 Inhaler 5    Ascorbic Acid (VITAMIN C) 500 MG CAPS Take by mouth 2 (two) times a day       aspirin (ECOTRIN LOW STRENGTH) 81 mg EC tablet Take 81 mg by mouth daily       fluticasone (FLONASE) 50 mcg/act nasal spray 1 spray into each nostril daily      gabapentin (NEURONTIN) 300 mg capsule Take 1 capsule (300 mg total) by mouth daily at bedtime 90 capsule 1    Magnesium Oxide 400 MG CAPS Take 400 mg by mouth 2 (two) times a day       montelukast (SINGULAIR) 10 mg tablet Take 1 tablet (10 mg total) by mouth daily with breakfast 90 tablet 1    Multiple Vitamin (MULTI-DAY PO) Take 1 tablet by mouth daily      mupirocin (BACTROBAN) 2 % cream Apply topically 3 (three) times a day Prn, hands 30 g 5    pantoprazole (PROTONIX) 40 mg tablet Take 1 tablet (40 mg total) by mouth daily 90 tablet 1    propranolol (INDERAL LA) 160 mg Take 1 capsule (160 mg total) by mouth daily 90 capsule 1    ramipril (ALTACE) 10 MG capsule Take 2 capsules (20 mg total) by mouth 2 (two) times a day 180 capsule 1    SUMAtriptan (IMITREX) 100 mg tablet Take 1 tablet (100 mg total) by mouth once as needed for migraine for up to 1 dose 9 tablet 0    thiamine (VITAMIN B1) 100 mg tablet Take by mouth daily      verapamil (CALAN-SR) 180 mg CR tablet Take 1 tablet (180 mg total) by mouth daily at bedtime 90 tablet 1    clotrimazole-betamethasone (LOTRISONE) 1-0 05 % cream Apply topically 2 (two) times a day Short term, right arm 45 g 0    Ibuprofen (ADVIL MIGRAINE) 200 MG CAPS Take 600 mg by mouth as needed       No current facility-administered medications for this visit          Objective:    /70   Pulse 67   Temp 98 5 °F (36 9 °C)   Resp (!) 24   Ht 5' 1 5" (1 562 m)   Wt 83 5 kg (184 lb)   SpO2 97%   BMI 34 20 kg/m²        Physical Exam  Vitals signs and nursing note reviewed  Constitutional:       Appearance: He is well-developed  He is obese  He is not ill-appearing  HENT:      Head: Normocephalic  Nose: No rhinorrhea  Mouth/Throat:      Pharynx: No posterior oropharyngeal erythema  Eyes:      General: No scleral icterus  Conjunctiva/sclera: Conjunctivae normal    Neck:      Musculoskeletal: Neck supple  Cardiovascular:      Rate and Rhythm: Normal rate and regular rhythm  Heart sounds: No murmur  Pulmonary:      Effort: Pulmonary effort is normal  No respiratory distress  Abdominal:      General: There is distension  Palpations: Abdomen is soft  Tenderness: There is no abdominal tenderness  Hernia: A hernia is present  Hernia is present in the right inguinal area  There is no hernia in the left inguinal area  Musculoskeletal:         General: No deformity  Skin:     General: Skin is warm and dry  Findings: Rash present  Neurological:      Mental Status: He is alert  Psychiatric:         Behavior: Behavior normal          Thought Content: Thought content normal               Tobacco Cessation Counseling: Tobacco cessation counseling was provided  The patient is sincerely urged to quit consumption of tobacco  He is not ready to quit tobacco  Medication options not discussed         Telma Davidson DO

## 2020-08-24 ENCOUNTER — TELEPHONE (OUTPATIENT)
Dept: PHYSICAL THERAPY | Facility: OTHER | Age: 70
End: 2020-08-24

## 2020-08-24 NOTE — TELEPHONE ENCOUNTER
Call placed to the patient per [facility] referral     Ph # provided rings with no VM option  This the 2nd attempt to reach the patient  Will defer per protocol

## 2020-08-28 ENCOUNTER — TELEPHONE (OUTPATIENT)
Dept: PHYSICAL THERAPY | Facility: OTHER | Age: 70
End: 2020-08-28

## 2020-08-28 NOTE — TELEPHONE ENCOUNTER
Voice mail/message left requesting patient to return call to [facility] including our hours of business and phone number  Kindly asked to LM with Full Name,  and Reminded CB will come from a non- number as the nurses are working remotely/off-site      Referral closed per protocol

## 2020-09-01 ENCOUNTER — PATIENT OUTREACH (OUTPATIENT)
Dept: CASE MANAGEMENT | Facility: OTHER | Age: 70
End: 2020-09-01

## 2020-09-01 ENCOUNTER — CONSULT (OUTPATIENT)
Dept: PULMONOLOGY | Facility: MEDICAL CENTER | Age: 70
End: 2020-09-01
Payer: COMMERCIAL

## 2020-09-01 VITALS
TEMPERATURE: 98.7 F | RESPIRATION RATE: 16 BRPM | SYSTOLIC BLOOD PRESSURE: 162 MMHG | DIASTOLIC BLOOD PRESSURE: 90 MMHG | BODY MASS INDEX: 35.12 KG/M2 | OXYGEN SATURATION: 95 % | HEIGHT: 61 IN | WEIGHT: 186 LBS | HEART RATE: 78 BPM

## 2020-09-01 DIAGNOSIS — R06.00 DYSPNEA ON EXERTION: Primary | ICD-10-CM

## 2020-09-01 DIAGNOSIS — I10 BENIGN ESSENTIAL HYPERTENSION: ICD-10-CM

## 2020-09-01 DIAGNOSIS — E66.01 CLASS 2 SEVERE OBESITY DUE TO EXCESS CALORIES WITH SERIOUS COMORBIDITY AND BODY MASS INDEX (BMI) OF 35.0 TO 35.9 IN ADULT (HCC): ICD-10-CM

## 2020-09-01 PROBLEM — E66.812 CLASS 2 SEVERE OBESITY DUE TO EXCESS CALORIES WITH SERIOUS COMORBIDITY AND BODY MASS INDEX (BMI) OF 35.0 TO 35.9 IN ADULT (HCC): Status: ACTIVE | Noted: 2020-09-01

## 2020-09-01 PROCEDURE — 99204 OFFICE O/P NEW MOD 45 MIN: CPT | Performed by: INTERNAL MEDICINE

## 2020-09-01 NOTE — PROGRESS NOTES
Assessment/Plan:       Problem List Items Addressed This Visit        Cardiovascular and Mediastinum    Benign essential hypertension     He does have history of hypertension and is on medication for this  Other    Dyspnea on exertion - Primary     GrayMelintan Crimes has been having increased shortness of breath with activity over the past few months  His sister seems attributes this in part to 20 lb weight gain he has had since last year  She states with recent COVID pan epidemic he stopped doing his daily walks and is very inactive  Oxygen saturation showed some slight decreased from 95% rest to 93% with walking 250 ft   Lung examination was clear today  I did order complete PFT to assess lung function  This will also include hemoglobin  Last blood work done in May of 2018 showed hemoglobin to be 15 0  I also order chest x-ray  This will help assess heart size and also if there is any development of any process such as interstitial lung disease that would attribute this shortness of breath  He does have an intermittent wheeze and I gave him a 2 week samples of Anoro 1 puff daily  He sometimes has difficulty using his Ventolin inhaler even with spacer  I did tell patient and his sister Gladys mitchell next time he needs a rescue inhaler I could prescribe ProAir respiclick inhaler which would be easier for him to use  I did demonstrate this inhaler to him  I also given his 1st dose of Anoro today and he was able to take it without any difficulty    A further recommendation will be made pending results of pulmonary function test and chest x-ray         Relevant Orders    Complete PFT with post bronchodilator    XR chest pa & lateral    Class 2 severe obesity due to excess calories with serious comorbidity and body mass index (BMI) of 35 0 to 35 9 in adult [facility])     Graylon Crimes is overweight  He has gained 20 lb since last year and this is certainly contributing to his shortness of breath    He does have abdominal distention from his obesity and this is probably partly affected excursion of his diaphragms  I did advise him to lose weight  Also he does have some symptoms of obstructive sleep apnea  I discussed this with him and his sister  They want to have pulmonary function test 1st and then after a cough for results I will readdress with a want to consider a home sleep study  I did tell her that untreated sleep apnea could worsen his hypertension and make it hard to treat  Return if symptoms worsen or fail to improve  All questions are answered to the patient's satisfaction and understanding  He verbalizes understanding  He is encouraged to call with any further questions or concerns  Portions of the record may have been created with voice recognition software  Occasional wrong word or "sound a like" substitutions may have occurred due to the inherent limitations of voice recognition software  Read the chart carefully and recognize, using context, where substitutions have occurred  a    Electronically Signed by Gerson Brown DO    ______________________________________________________________________    Chief Complaint:   Chief Complaint   Patient presents with   [facility]    Breathing Problem        Patient ID: Jesús Marmolejo is a 72 y o  y o  male has a past medical history of Arthritis, Chest pain (02/01/2006), Chronic pain, Closed fracture of one rib (07/27/2007), Diabetes mellitus ([facility] [address] ), Environmental allergies, GERD (gastroesophageal reflux disease), Hiatal hernia, Hyperlipidemia (01/05/2015), Hypertension, Migraine, and Sebaceous cyst (01/11/2016)  9/1/2020  Patient presents today for initial visit for shortness of breath    HPI     Jesús Marmolejo presents today for evaluation of shortness of breaths he has been having with activity  He was accompanied by his sister Jenn Carbajal who also helped provide history  They live only a few doors from each other in [place]    Since COVID started Glenn Gilbert stop doing his daily walks and has been less active  He has gained 15 lb  Since March he has noticed that he has been having shortness of breath walking 200 or more yd on also sometimes going up a flight of stairs  Also has an occasional wheeze which she has had since November of last year  He was given a ProAir inhaler which she a periodically uses any states that helps some  He usually will take this before he does any walking  He used to walk at least a couple miles on almost daily basis prior to [facility] but since [facility] he has not been doing his daily walks anymore  Denies any chronic cough  No chest pain with activity  Not have any leg edema  Sister states he may have started some mild shortness of breath last year but became more eccentric weighted since he has gained weight this past spring on since he has become less active    Glenn Gilbert never smoked but is sister states he lives with the parents until he was 44years old  His father was a heavy smoker and did die of lung cancer  Also Glenn Gilbert was born about 3 months premature  He was hospitalized for appeared of time after his delivery  No history of asthma or heart disease  He is not have any chronic cough  He does have history of hypertension and hyperlipidemia  Also history of migraine headaches but these have been controlled with medication  Last lipid profile done June 1st showed his cholesterol to be elevated at 253 and triglycerides were 763  His creatinine was 1  19  Did have it overnight hospital stay at [facility] in [place], [place] in late May of 2019  He was at his family's [facility] home  He then developed a headache, nausea dizziness and vomiting  He he also was having some wheezing  He did have a CT scan of his abdomen pelvis which showed no acute intra-abdominal abnormality    There is report tiny pleural effusion and was felt he may have had some mild pulmonary vascular congestion  He was given Lasix  D-dimer was elevated mildly at 0 75  Did nuclear lung scan showed low probability for PE and venous Doppler studies of his legs were negative  He had an echocardiogram and limits report I could find indicated his ejection fraction was 50%  Blood work from that visit was reviewed and hemoglobin is 15 0  His creatinine was 1 1  Does have some mild intellectual impairment and his sister trace it does monitor his health  Patient does have some snoring and does have some fatigue  Occasional shortness of breath at night  He weighs 186 lb today and June of last year weight 166 lb so he has gained 20 lbs  Occupational/Exposure history: Reired  Did work at [employer] as a [employer]      Review of Systems   Constitutional: Positive for fatigue  Negative for chills and fever  HENT: Negative for congestion, rhinorrhea and sore throat  Eyes: Negative for discharge and redness  Respiratory: Positive for shortness of breath  Negative for cough  Has occasional wheezing  Cardiovascular: Negative for chest pain, palpitations and leg swelling  Gastrointestinal: Negative for abdominal distention, abdominal pain and nausea  Endocrine: Negative for polydipsia and polyphagia  Genitourinary: Negative for dysuria  Musculoskeletal: Negative for joint swelling and myalgias  Skin: Negative for rash  Neurological: Negative for light-headedness  Social history: He reports that he is a non-smoker but has been exposed to tobacco smoke  He has been exposed to tobacco smoke for the past 42 00 years  He has never used smokeless tobacco  He reports current alcohol use of about 4 0 standard drinks of alcohol per week  He reports that he does not use drugs      Past surgical history:   Past Surgical History:   Procedure Laterality Date    COLONOSCOPY       Family history:   Family History   Problem Relation Age of Onset    Arthritis Mother     Lung cancer Father     Asthma Brother        Immunization History   Administered Date(s) Administered    Tuberculin Skin Test-PPD Intradermal 11/05/2019     Current Outpatient Medications   Medication Sig Dispense Refill    acetaminophen (TYLENOL ARTHRITIS PAIN) 650 mg CR tablet Take 650 mg by mouth as needed        albuterol (PROVENTIL HFA,VENTOLIN HFA) 90 mcg/act inhaler Inhale 2 puffs every 6 (six) hours as needed for wheezing or shortness of breath (swish/spit after use) 1 Inhaler 5    Ascorbic Acid (VITAMIN C) 500 MG CAPS Take by mouth 2 (two) times a day       aspirin (ECOTRIN LOW STRENGTH) 81 mg EC tablet Take 81 mg by mouth daily       clotrimazole-betamethasone (LOTRISONE) 1-0 05 % cream Apply topically 2 (two) times a day Short term, right arm 45 g 0    fluticasone (FLONASE) 50 mcg/act nasal spray 1 spray into each nostril daily      gabapentin (NEURONTIN) 300 mg capsule Take 1 capsule (300 mg total) by mouth daily at bedtime 90 capsule 1    Magnesium Oxide 400 MG CAPS Take 400 mg by mouth 2 (two) times a day       montelukast (SINGULAIR) 10 mg tablet Take 1 tablet (10 mg total) by mouth daily with breakfast 90 tablet 1    Multiple Vitamin (MULTI-DAY PO) Take 1 tablet by mouth daily      Multiple Vitamins-Minerals (PRESERVISION AREDS PO) Take by mouth daily      mupirocin (BACTROBAN) 2 % cream Apply topically 3 (three) times a day Prn, hands 30 g 5    pantoprazole (PROTONIX) 40 mg tablet Take 1 tablet (40 mg total) by mouth daily 90 tablet 1    propranolol (INDERAL LA) 160 mg Take 1 capsule (160 mg total) by mouth daily 90 capsule 1    ramipril (ALTACE) 10 MG capsule Take 2 capsules (20 mg total) by mouth 2 (two) times a day 180 capsule 1    SUMAtriptan (IMITREX) 100 mg tablet Take 1 tablet (100 mg total) by mouth once as needed for migraine for up to 1 dose 9 tablet 0    thiamine (VITAMIN B1) 100 mg tablet Take by mouth daily      verapamil (CALAN-SR) 180 mg CR tablet Take 1 tablet (180 mg total) by mouth daily at bedtime 90 tablet 1    Ibuprofen (ADVIL MIGRAINE) 200 MG CAPS Take 600 mg by mouth as needed       No current facility-administered medications for this visit  Allergies: Other    Objective:  Vitals:    09/01/20 1106   BP: 162/90   BP Location: Left arm   Patient Position: Sitting   Cuff Size: Standard   Pulse: 78   Resp: 16   Temp: 98 7 °F (37 1 °C)   TempSrc: Temporal   SpO2: 95%   Weight: 84 4 kg (186 lb)   Height: 5' 1" (1 549 m)   Oxygen Therapy  SpO2: 95 %    Wt Readings from Last 3 Encounters:   09/01/20 84 4 kg (186 lb)   08/19/20 83 5 kg (184 lb)   06/25/20 83 kg (183 lb)     Body mass index is 35 14 kg/m²  Physical Exam  Constitutional:       General: He is not in acute distress  Appearance: Normal appearance  He is well-developed  He is obese  HENT:      Head: Normocephalic  Nose: Nose normal       Comments: Mallampati score is 2     Mouth/Throat:      Mouth: Mucous membranes are moist       Pharynx: Oropharynx is clear  No oropharyngeal exudate  Eyes:      Conjunctiva/sclera: Conjunctivae normal       Pupils: Pupils are equal, round, and reactive to light  Neck:      Musculoskeletal: Neck supple  Vascular: No JVD  Trachea: No tracheal deviation  Cardiovascular:      Rate and Rhythm: Normal rate and regular rhythm  Heart sounds: Normal heart sounds  Pulmonary:      Effort: Pulmonary effort is normal       Comments: Lung sounds are clear  No wheezes crackles or rhonchi  Abdominal:      General: There is no distension  Palpations: Abdomen is soft  Tenderness: There is no abdominal tenderness  There is no guarding  Comments: Obese  Musculoskeletal:      Comments: No edema, cyanosis or clubbing   Lymphadenopathy:      Cervical: No cervical adenopathy  Skin:     General: Skin is warm and dry  Findings: No rash  Neurological:      Mental Status: He is alert and oriented to person, place, and time     Psychiatric:         Mood and Affect: Mood normal          Behavior: Behavior normal          Thought Content: Thought content normal          Lab Review:   Lab Results   Component Value Date     07/25/2017    K 4 2 06/01/2020     06/01/2020    CO2 21 06/01/2020    BUN 27 06/01/2020    CREATININE 1 19 06/01/2020    CREATININE 1 20 10/26/2017    CREATININE 1 40 (H) 07/25/2017    GLUCOSE 120 (H) 07/25/2017    CALCIUM 9 1 10/26/2017    CALCIUM 9 0 07/25/2017       Diagnostics:  I have personally reviewed pertinent films in PACS  Chest x-ray:  Done October 25, 2017 was reviewed by me  This shows slightly shallow depth of inspiration  No sign of any interstitial lung disease    Heart size appears normal   I also reviewed CT scan of abdomen done October 2017 and no sign of any interstitial lung disease in the lower lobes that were visualized on this study      Room air O2 saturation at rest was 95% and ambulating 250 ft was 93%

## 2020-09-01 NOTE — ASSESSMENT & PLAN NOTE
Sonido Perkins has been having increased shortness of breath with activity over the past few months  His sister seems attributes this in part to 20 lb weight gain he has had since last year  She states with recent COVID pan epidemic he stopped doing his daily walks and is very inactive  Oxygen saturation showed some slight decreased from 95% rest to 93% with walking 250 ft   Lung examination was clear today  I did order complete PFT to assess lung function  This will also include hemoglobin  Last blood work done in May of 2018 showed hemoglobin to be 15 0  I also order chest x-ray  This will help assess heart size and also if there is any development of any process such as interstitial lung disease that would attribute this shortness of breath  He does have an intermittent wheeze and I gave him a 2 week samples of Anoro 1 puff daily  He sometimes has difficulty using his Ventolin inhaler even with spacer  I did tell patient and his sister Marc mitchell next time he needs a rescue inhaler I could prescribe ProAir respiclick inhaler which would be easier for him to use  I did demonstrate this inhaler to him    I also given his 1st dose of Anoro today and he was able to take it without any difficulty    A further recommendation will be made pending results of pulmonary function test and chest x-ray

## 2020-09-01 NOTE — ASSESSMENT & PLAN NOTE
Rachna Gold is overweight  He has gained 20 lb since last year and this is certainly contributing to his shortness of breath  He does have abdominal distention from his obesity and this is probably partly affected excursion of his diaphragms  I did advise him to lose weight  Also he does have some symptoms of obstructive sleep apnea  I discussed this with him and his sister  They want to have pulmonary function test 1st and then after a cough for results I will readdress with a want to consider a home sleep study  I did tell her that untreated sleep apnea could worsen his hypertension and make it hard to treat

## 2020-09-01 NOTE — PATIENT INSTRUCTIONS
Blood pressure reading 162/94 mm Hg  in right arm    Schedule pulmonary function test at the hospital    Try new inhaler Anoro 1 puff daily in the morning    If Nahun Redd has difficulty using Ventolin inhaler    Then can consider ProAir respi-click inhaler as a rescue inhaler 2 puffs 4 times a day as needed    Think about obstructive sleep apnea - I can order home sleep test   We will discuss after complete pulmonary function test is done    Go for chest x-ray same day as your Pulmonary function test

## 2020-09-03 ENCOUNTER — PATIENT OUTREACH (OUTPATIENT)
Dept: CASE MANAGEMENT | Facility: OTHER | Age: 70
End: 2020-09-03

## 2020-09-08 ENCOUNTER — PATIENT OUTREACH (OUTPATIENT)
Dept: CASE MANAGEMENT | Facility: OTHER | Age: 70
End: 2020-09-08

## 2020-09-15 ENCOUNTER — HOSPITAL ENCOUNTER (OUTPATIENT)
Dept: PULMONOLOGY | Facility: HOSPITAL | Age: 70
Discharge: HOME | End: 2020-09-15
Attending: INTERNAL MEDICINE
Payer: COMMERCIAL

## 2020-09-15 ENCOUNTER — TRANSCRIBE ORDERS (OUTPATIENT)
Dept: ADMINISTRATIVE | Facility: HOSPITAL | Age: 70
End: 2020-09-15

## 2020-09-15 ENCOUNTER — HOSPITAL ENCOUNTER (OUTPATIENT)
Dept: RADIOLOGY | Facility: HOSPITAL | Age: 70
Discharge: HOME | End: 2020-09-15
Attending: INTERNAL MEDICINE
Payer: COMMERCIAL

## 2020-09-15 ENCOUNTER — TELEPHONE (OUTPATIENT)
Dept: PULMONOLOGY | Facility: MEDICAL CENTER | Age: 70
End: 2020-09-15

## 2020-09-15 DIAGNOSIS — R06.00 DYSPNEA ON EXERTION: ICD-10-CM

## 2020-09-15 DIAGNOSIS — R06.00 DYSPNEA ON EXERTION: Primary | ICD-10-CM

## 2020-09-15 PROCEDURE — 94760 N-INVAS EAR/PLS OXIMETRY 1: CPT

## 2020-09-15 PROCEDURE — 94729 DIFFUSING CAPACITY: CPT | Performed by: INTERNAL MEDICINE

## 2020-09-15 PROCEDURE — 94010 BREATHING CAPACITY TEST: CPT | Performed by: INTERNAL MEDICINE

## 2020-09-15 PROCEDURE — 71046 X-RAY EXAM CHEST 2 VIEWS: CPT

## 2020-09-15 PROCEDURE — 94729 DIFFUSING CAPACITY: CPT

## 2020-09-15 PROCEDURE — 94010 BREATHING CAPACITY TEST: CPT

## 2020-09-15 PROCEDURE — 94726 PLETHYSMOGRAPHY LUNG VOLUMES: CPT

## 2020-09-15 PROCEDURE — 94726 PLETHYSMOGRAPHY LUNG VOLUMES: CPT | Performed by: INTERNAL MEDICINE

## 2020-09-15 NOTE — PROGRESS NOTES
I spoke with patient's sister pam Osullivan did have some improvement with Anoro 1 puff daily and would like to stay on this  He has less exertional dyspnea  Recent complete PFT showed lung volumes to be only minimally decreased with no clear-cut airflow obstruction  Diffusion capacity was 55% of predicted  Chest x-ray was unremarkable  I did send prescription for Anoro 1 puff daily as he only had a sample this    Also if he does not have any improvement with trying to exercise more lose weight she can call my office and I told her I would consider ordering echocardiogram

## 2020-09-15 NOTE — TELEPHONE ENCOUNTER
Requesting Anoro script be sent to the pharmacy     Was given sample at [facility] and would now like script sent

## 2020-09-23 ENCOUNTER — CONSULT (OUTPATIENT)
Dept: SURGERY | Facility: CLINIC | Age: 70
End: 2020-09-23
Payer: COMMERCIAL

## 2020-09-23 VITALS
TEMPERATURE: 97.9 F | HEIGHT: 61 IN | HEART RATE: 94 BPM | WEIGHT: 189 LBS | SYSTOLIC BLOOD PRESSURE: 166 MMHG | DIASTOLIC BLOOD PRESSURE: 84 MMHG | BODY MASS INDEX: 35.68 KG/M2

## 2020-09-23 DIAGNOSIS — R06.00 DYSPNEA ON EXERTION: ICD-10-CM

## 2020-09-23 DIAGNOSIS — E66.01 CLASS 2 SEVERE OBESITY DUE TO EXCESS CALORIES WITH SERIOUS COMORBIDITY AND BODY MASS INDEX (BMI) OF 35.0 TO 35.9 IN ADULT (HCC): ICD-10-CM

## 2020-09-23 DIAGNOSIS — K40.90 RIGHT INGUINAL HERNIA: ICD-10-CM

## 2020-09-23 DIAGNOSIS — R06.02 SOB (SHORTNESS OF BREATH): ICD-10-CM

## 2020-09-23 DIAGNOSIS — N40.0 BENIGN PROSTATIC HYPERPLASIA: ICD-10-CM

## 2020-09-23 PROCEDURE — 1036F TOBACCO NON-USER: CPT | Performed by: SPECIALIST

## 2020-09-23 PROCEDURE — 3077F SYST BP >= 140 MM HG: CPT | Performed by: SPECIALIST

## 2020-09-23 PROCEDURE — 3079F DIAST BP 80-89 MM HG: CPT | Performed by: SPECIALIST

## 2020-09-23 PROCEDURE — 99204 OFFICE O/P NEW MOD 45 MIN: CPT | Performed by: SPECIALIST

## 2020-09-23 NOTE — PROGRESS NOTES
History and Physical Examination - General Surgery   [facility]  Jesús Juárez 72 y o  male MRN: [id number]  Unit/Bed#:  Encounter: [id number] PCP: Leo Kim DO    History of Present Illness   Chief Complaint:    I have right inguinal hernia for more than 2 years  I am here for possible surgery    HPI:  Kemar Pierce is a 72 y o  male who presents to office with right inguinal hernia for more than 2 years  Which was not causing him problems    This was diagnosed while colonoscopy was being performed    Patient is morbidly morbid obese/and having a difficult breathing visibly short of breath/patient is following   Dr Gerson Brown DO order some workup recently    Denies any nausea vomiting diarrhea constipation denies any fever chills rigors    Historical Information   The following portions of the patient's history were reviewed and updated as appropriate  Past Medical History:   Diagnosis Date    Arthritis     Chest pain 02/01/2006    Chronic pain     lower back    Closed fracture of one rib 07/27/2007    Diabetes mellitus ([address] )     boarderline diabetes    Environmental allergies     GERD (gastroesophageal reflux disease)     Hiatal hernia     Hyperlipidemia 01/05/2015    Hypertension     Migraine     Sebaceous cyst 01/11/2016     Past Surgical History:   Procedure Laterality Date    COLONOSCOPY       Social History   Social History     Substance and Sexual Activity   Alcohol Use Yes    Alcohol/week: 4 0 standard drinks    Types: 4 Glasses of wine per week    Comment: social     Social History     Substance and Sexual Activity   Drug Use No     Social History     Tobacco Use   Smoking Status Passive Smoke Exposure - Never Smoker   Smokeless Tobacco Never Used   Tobacco Comment    from infancy      Family History:   Family History   Problem Relation Age of Onset    Arthritis Mother     COPD Mother     Lung cancer Father     Asthma Brother     Diabetes Sister     Hypertension Sister     Stomach cancer Brother        Meds/Allergies   Allergies   Allergen Reactions    Other Sneezing     SEASONAL        Current Outpatient Medications:     acetaminophen (TYLENOL ARTHRITIS PAIN) 650 mg CR tablet, Take 650 mg by mouth as needed  , Disp: , Rfl:     Ascorbic Acid (VITAMIN C) 500 MG CAPS, Take by mouth 2 (two) times a day , Disp: , Rfl:     aspirin (ECOTRIN LOW STRENGTH) 81 mg EC tablet, Take 81 mg by mouth daily , Disp: , Rfl:     clotrimazole-betamethasone (LOTRISONE) 1-0 05 % cream, Apply topically 2 (two) times a day Short term, right arm, Disp: 45 g, Rfl: 0    fluticasone (FLONASE) 50 mcg/act nasal spray, 1 spray into each nostril daily, Disp: , Rfl:     gabapentin (NEURONTIN) 300 mg capsule, Take 1 capsule (300 mg total) by mouth daily at bedtime, Disp: 90 capsule, Rfl: 1    Ibuprofen (ADVIL MIGRAINE) 200 MG CAPS, Take 600 mg by mouth as needed, Disp: , Rfl:     Magnesium Oxide 400 MG CAPS, Take 400 mg by mouth 2 (two) times a day , Disp: , Rfl:     montelukast (SINGULAIR) 10 mg tablet, Take 1 tablet (10 mg total) by mouth daily with breakfast, Disp: 90 tablet, Rfl: 1    Multiple Vitamin (MULTI-DAY PO), Take 1 tablet by mouth daily, Disp: , Rfl:     Multiple Vitamins-Minerals (PRESERVISION AREDS PO), Take by mouth daily, Disp: , Rfl:     mupirocin (BACTROBAN) 2 % cream, Apply topically 3 (three) times a day Prn, hands, Disp: 30 g, Rfl: 5    pantoprazole (PROTONIX) 40 mg tablet, Take 1 tablet (40 mg total) by mouth daily, Disp: 90 tablet, Rfl: 1    propranolol (INDERAL LA) 160 mg, Take 1 capsule (160 mg total) by mouth daily, Disp: 90 capsule, Rfl: 1    ramipril (ALTACE) 10 MG capsule, Take 2 capsules (20 mg total) by mouth 2 (two) times a day, Disp: 180 capsule, Rfl: 1    SUMAtriptan (IMITREX) 100 mg tablet, Take 1 tablet (100 mg total) by mouth once as needed for migraine for up to 1 dose, Disp: 9 tablet, Rfl: 0    thiamine (VITAMIN B1) 100 mg tablet, Take by mouth daily, Disp: , Rfl:     umeclidinium-vilanterol (ANORO ELLIPTA) 62 5-25 MCG/INH inhaler, Inhale 1 puff daily, Disp: 1 Inhaler, Rfl: 11    verapamil (CALAN-SR) 180 mg CR tablet, Take 1 tablet (180 mg total) by mouth daily at bedtime, Disp: 90 tablet, Rfl: 1    albuterol (PROVENTIL HFA,VENTOLIN HFA) 90 mcg/act inhaler, Inhale 2 puffs every 6 (six) hours as needed for wheezing or shortness of breath (swish/spit after use) (Patient not taking: Reported on 9/23/2020), Disp: 1 Inhaler, Rfl: 5     REVIEW OF SYSTEMS  Constitutional:  Denies fever or chills   Eyes:  Denies change in visual acuity   HENT:  Denies nasal congestion or sore throat   Respiratory:  Complaining of cough and shortness of breath  Cardiovascular:  Denies chest pain or edema   GI:  Discomfort right lower quadrant abdominal pain, no nausea, vomiting, bloody stools or diarrhea   :  No dysuria, frequency, difficulty in micturition and nocturia  Musculoskeletal:  Extensive history of back pain   Neurologic:  Denies headache, focal weakness or sensory changes   Endocrine:  Denies polyuria or polydipsia   Lymphatic:  Denies swollen glands   Psychiatric:  Denies depression or anxiety     Objective   Current Vitals:   /84 (BP Location: Left arm, Patient Position: Sitting, Cuff Size: Large)   Pulse 94   Temp 97 9 °F (36 6 °C) (Core)   Ht 5' 1" (1 549 m)   Wt 85 7 kg (189 lb)   BMI 35 71 kg/m²   Body mass index is 35 71 kg/m²  PHYSICAL EXAMS  General:  Patient is not in acute distress, laying in the bed comfortably, awake, alert responding to commands,  Visibly short of breath/   HEENT:  Both pupils normal-size atraumatic, normocephalic, nonicteric  Neck:  JVP not raised  Trachea central  Respiratory:  normal Breath sounds clear to auscultation,  Cardiovascular:  S1-S2 normal without any murmur   GI:  Abdomen soft nontender    Morbidly obese difficult clinical examination  Small right inguinal hernia nontender reducible  Musculoskeletal:  History of back pain  Integument:  No skin rashes or ulceration  Lymphatic:  No cervical or inguinal lymphadenopathy  Neurologic:  Patient is awake alert, responding to command, well-oriented to time and place and person moving all extremities ambulating well    Visit Diagnosis:   Diagnoses and all orders for this visit:    BMI 30 0-30 9,adult    Right inguinal hernia  -     Ambulatory referral to General Surgery    SOB (shortness of breath)    Dyspnea on exertion    Class 2 severe obesity due to excess calories with serious comorbidity and body mass index (BMI) of 35 0 to 35 9 in adult [facility])    Benign prostatic hyperplasia       Plan of care was discussed with patient and his sister in detail    Pertinent labs reviewed  Pertinent images and available reads personally reviewed  Procedure: Xr Chest Pa & Lateral    Result Date: 9/15/2020  Narrative: CHEST INDICATION:   R06 00: Dyspnea, unspecified  COMPARISON:  Chest x-ray on 10/25/2017  CT scan of the abdomen and pelvis on 10/26/2017  Chest x-ray on 04/16/2011 EXAM PERFORMED/VIEWS:  XR CHEST PA & LATERAL Images: 2 FINDINGS: Cardiomediastinal silhouette appears enlarged  A pericardial fat pad is present  Pulmonary vessels are normal  The lungs are clear  No pneumothorax or pleural effusion  Moderate compression of a mid thoracic vertebral body, likely nonacute  Impression: No acute cardiopulmonary disease  Workstation performed: [id number]     Pertinent notes reviewed    Assessment/Plan   Assessment:  Morbidly morbid obese  Right inguinal hernia  Shortness of breath    Plan:  The risks, benefits, alternatives,and probabilities of success were discussed in detail with no guarantee made as to outcome  All questions were answered to the patient's satisfaction  CBC/BMP/hemoglobin A1c/CT scan of abdominal pelvis with p o   And IV contrast  Follow-up in 3 weeks    Counseling / Coordination of Care  Expained patient family in detailed about coordination of care  A description of the counseling / coordination of care:  I performed an interim history, pertinent images and labs, performed a physical examination to arrive at the plan delineated above with associated thought processes  Family member/primary contact updated -  sister  at the bedside    Dr Kathy Ervin MD [place]    Office   Tel  [phone]  Fax   [phone]

## 2020-10-06 ENCOUNTER — TELEPHONE (OUTPATIENT)
Dept: SURGERY | Facility: CLINIC | Age: 70
End: 2020-10-06

## 2020-10-08 LAB
BASOPHILS # BLD AUTO: 0.1 X10E3/UL (ref 0–0.2)
BASOPHILS NFR BLD AUTO: 1 %
BUN SERPL-MCNC: 19 MG/DL (ref 8–27)
BUN/CREAT SERPL: 17 (ref 10–24)
CALCIUM SERPL-MCNC: 9 MG/DL (ref 8.6–10.2)
CHLORIDE SERPL-SCNC: 102 MMOL/L (ref 96–106)
CO2 SERPL-SCNC: 21 MMOL/L (ref 20–29)
CREAT SERPL-MCNC: 1.12 MG/DL (ref 0.76–1.27)
EOSINOPHIL # BLD AUTO: 0.1 X10E3/UL (ref 0–0.4)
EOSINOPHIL NFR BLD AUTO: 3 %
ERYTHROCYTE [DISTWIDTH] IN BLOOD BY AUTOMATED COUNT: 13.4 % (ref 11.6–15.4)
GLUCOSE SERPL-MCNC: 139 MG/DL (ref 65–99)
HCT VFR BLD AUTO: 43.8 % (ref 37.5–51)
HGB BLD-MCNC: 14.8 G/DL (ref 13–17.7)
IMM GRANULOCYTES # BLD: 0 X10E3/UL (ref 0–0.1)
IMM GRANULOCYTES NFR BLD: 1 %
LYMPHOCYTES # BLD AUTO: 1 X10E3/UL (ref 0.7–3.1)
LYMPHOCYTES NFR BLD AUTO: 24 %
MCH RBC QN AUTO: 30.6 PG (ref 26.6–33)
MCHC RBC AUTO-ENTMCNC: 33.8 G/DL (ref 31.5–35.7)
MCV RBC AUTO: 91 FL (ref 79–97)
MONOCYTES # BLD AUTO: 0.5 X10E3/UL (ref 0.1–0.9)
MONOCYTES NFR BLD AUTO: 11 %
NEUTROPHILS # BLD AUTO: 2.7 X10E3/UL (ref 1.4–7)
NEUTROPHILS NFR BLD AUTO: 60 %
PLATELET # BLD AUTO: 186 X10E3/UL (ref 150–450)
POTASSIUM SERPL-SCNC: 4.4 MMOL/L (ref 3.5–5.2)
RBC # BLD AUTO: 4.83 X10E6/UL (ref 4.14–5.8)
SL AMB EGFR AFRICAN AMERICAN: 77 ML/MIN/1.73
SL AMB EGFR NON AFRICAN AMERICAN: 67 ML/MIN/1.73
SODIUM SERPL-SCNC: 138 MMOL/L (ref 134–144)
WBC # BLD AUTO: 4.4 X10E3/UL (ref 3.4–10.8)

## 2020-10-13 ENCOUNTER — HOSPITAL ENCOUNTER (OUTPATIENT)
Dept: RADIOLOGY | Facility: HOSPITAL | Age: 70
Discharge: HOME | End: 2020-10-13
Attending: SPECIALIST
Payer: COMMERCIAL

## 2020-10-13 DIAGNOSIS — R06.00 DYSPNEA ON EXERTION: ICD-10-CM

## 2020-10-13 DIAGNOSIS — K40.90 RIGHT INGUINAL HERNIA: ICD-10-CM

## 2020-10-13 DIAGNOSIS — N40.0 BENIGN PROSTATIC HYPERPLASIA: ICD-10-CM

## 2020-10-13 DIAGNOSIS — E66.01 CLASS 2 SEVERE OBESITY DUE TO EXCESS CALORIES WITH SERIOUS COMORBIDITY AND BODY MASS INDEX (BMI) OF 35.0 TO 35.9 IN ADULT (HCC): ICD-10-CM

## 2020-10-13 DIAGNOSIS — R06.02 SOB (SHORTNESS OF BREATH): ICD-10-CM

## 2020-10-13 PROCEDURE — 71260 CT THORAX DX C+: CPT

## 2020-10-13 PROCEDURE — G1004 CDSM NDSC: HCPCS

## 2020-10-13 PROCEDURE — 74177 CT ABD & PELVIS W/CONTRAST: CPT

## 2020-10-13 RX ADMIN — IOHEXOL 100 ML: 350 INJECTION, SOLUTION INTRAVENOUS at 09:34

## 2020-10-14 ENCOUNTER — TELEPHONE (OUTPATIENT)
Dept: FAMILY MEDICINE CLINIC | Facility: CLINIC | Age: 70
End: 2020-10-14

## 2020-10-14 DIAGNOSIS — I10 BENIGN ESSENTIAL HYPERTENSION: Primary | ICD-10-CM

## 2020-10-14 DIAGNOSIS — I10 BENIGN ESSENTIAL HYPERTENSION: ICD-10-CM

## 2020-10-14 RX ORDER — RAMIPRIL 10 MG/1
20 CAPSULE ORAL DAILY
Qty: 180 CAPSULE | Refills: 1 | Status: SHIPPED | OUTPATIENT
Start: 2020-10-14 | End: 2020-12-03 | Stop reason: SDUPTHER

## 2020-10-14 RX ORDER — RAMIPRIL 10 MG/1
CAPSULE ORAL
Qty: 90 CAPSULE | Refills: 1 | Status: SHIPPED | OUTPATIENT
Start: 2020-10-14 | End: 2020-10-14 | Stop reason: SDUPTHER

## 2020-10-26 DIAGNOSIS — G43.009 MIGRAINE WITHOUT AURA AND WITHOUT STATUS MIGRAINOSUS, NOT INTRACTABLE: ICD-10-CM

## 2020-10-26 RX ORDER — SUMATRIPTAN 100 MG/1
100 TABLET, FILM COATED ORAL ONCE AS NEEDED
Qty: 9 TABLET | Refills: 0 | Status: SHIPPED | OUTPATIENT
Start: 2020-10-26 | End: 2021-01-14 | Stop reason: SDUPTHER

## 2020-10-28 ENCOUNTER — OFFICE VISIT (OUTPATIENT)
Dept: SURGERY | Facility: CLINIC | Age: 70
End: 2020-10-28
Payer: COMMERCIAL

## 2020-10-28 VITALS
TEMPERATURE: 96.8 F | BODY MASS INDEX: 35.87 KG/M2 | HEIGHT: 61 IN | WEIGHT: 190 LBS | DIASTOLIC BLOOD PRESSURE: 80 MMHG | SYSTOLIC BLOOD PRESSURE: 152 MMHG | HEART RATE: 55 BPM

## 2020-10-28 DIAGNOSIS — K44.9 HIATAL HERNIA: ICD-10-CM

## 2020-10-28 DIAGNOSIS — E66.01 CLASS 2 SEVERE OBESITY DUE TO EXCESS CALORIES WITH SERIOUS COMORBIDITY AND BODY MASS INDEX (BMI) OF 35.0 TO 35.9 IN ADULT (HCC): Primary | ICD-10-CM

## 2020-10-28 DIAGNOSIS — K21.9 GASTROESOPHAGEAL REFLUX DISEASE: ICD-10-CM

## 2020-10-28 DIAGNOSIS — I10 BENIGN ESSENTIAL HYPERTENSION: ICD-10-CM

## 2020-10-28 DIAGNOSIS — E78.5 HYPERLIPIDEMIA LDL GOAL <130: ICD-10-CM

## 2020-10-28 DIAGNOSIS — K21.9 GE REFLUX: ICD-10-CM

## 2020-10-28 DIAGNOSIS — R06.00 DYSPNEA ON EXERTION: ICD-10-CM

## 2020-10-28 DIAGNOSIS — R73.01 IFG (IMPAIRED FASTING GLUCOSE): ICD-10-CM

## 2020-10-28 DIAGNOSIS — K40.90 LEFT INGUINAL HERNIA: ICD-10-CM

## 2020-10-28 DIAGNOSIS — B35.4 TINEA CORPORIS: ICD-10-CM

## 2020-10-28 DIAGNOSIS — K42.0 UMBILICAL HERNIA WITH OBSTRUCTION, WITHOUT GANGRENE: ICD-10-CM

## 2020-10-28 DIAGNOSIS — E66.9 OBESITY (BMI 30-39.9): ICD-10-CM

## 2020-10-28 DIAGNOSIS — R06.02 SOB (SHORTNESS OF BREATH): ICD-10-CM

## 2020-10-28 DIAGNOSIS — B36.9 FUNGAL INFECTION OF SKIN OF ABDOMEN: ICD-10-CM

## 2020-10-28 PROCEDURE — 99213 OFFICE O/P EST LOW 20 MIN: CPT | Performed by: SPECIALIST

## 2020-10-28 RX ORDER — MONTELUKAST SODIUM 10 MG/1
10 TABLET ORAL
Qty: 90 TABLET | Refills: 0 | Status: SHIPPED | OUTPATIENT
Start: 2020-10-28 | End: 2021-03-25 | Stop reason: SDUPTHER

## 2020-11-23 ENCOUNTER — OFFICE VISIT (OUTPATIENT)
Dept: NEUROLOGY | Facility: CLINIC | Age: 70
End: 2020-11-23
Payer: COMMERCIAL

## 2020-11-23 VITALS
SYSTOLIC BLOOD PRESSURE: 162 MMHG | TEMPERATURE: 96.8 F | HEIGHT: 61 IN | WEIGHT: 185 LBS | DIASTOLIC BLOOD PRESSURE: 95 MMHG | BODY MASS INDEX: 34.93 KG/M2 | HEART RATE: 62 BPM

## 2020-11-23 DIAGNOSIS — I10 BENIGN ESSENTIAL HYPERTENSION: ICD-10-CM

## 2020-11-23 DIAGNOSIS — G43.009 MIGRAINE WITHOUT AURA AND WITHOUT STATUS MIGRAINOSUS, NOT INTRACTABLE: Primary | ICD-10-CM

## 2020-11-23 PROCEDURE — 3077F SYST BP >= 140 MM HG: CPT | Performed by: NURSE PRACTITIONER

## 2020-11-23 PROCEDURE — 1036F TOBACCO NON-USER: CPT | Performed by: NURSE PRACTITIONER

## 2020-11-23 PROCEDURE — 3008F BODY MASS INDEX DOCD: CPT | Performed by: NURSE PRACTITIONER

## 2020-11-23 PROCEDURE — 3080F DIAST BP >= 90 MM HG: CPT | Performed by: NURSE PRACTITIONER

## 2020-11-23 PROCEDURE — 1160F RVW MEDS BY RX/DR IN RCRD: CPT | Performed by: NURSE PRACTITIONER

## 2020-11-23 PROCEDURE — 99214 OFFICE O/P EST MOD 30 MIN: CPT | Performed by: NURSE PRACTITIONER

## 2020-11-30 LAB
ALBUMIN SERPL-MCNC: 3.8 G/DL (ref 3.8–4.8)
ALBUMIN/GLOB SERPL: 1.8 {RATIO} (ref 1.2–2.2)
ALP SERPL-CCNC: 103 IU/L (ref 39–117)
ALT SERPL-CCNC: 43 IU/L (ref 0–44)
AST SERPL-CCNC: 29 IU/L (ref 0–40)
BILIRUB SERPL-MCNC: 0.3 MG/DL (ref 0–1.2)
BUN SERPL-MCNC: 22 MG/DL (ref 8–27)
BUN/CREAT SERPL: 18 (ref 10–24)
CALCIUM SERPL-MCNC: 8.9 MG/DL (ref 8.6–10.2)
CHLORIDE SERPL-SCNC: 105 MMOL/L (ref 96–106)
CO2 SERPL-SCNC: 20 MMOL/L (ref 20–29)
CREAT SERPL-MCNC: 1.19 MG/DL (ref 0.76–1.27)
GLOBULIN SER-MCNC: 2.1 G/DL (ref 1.5–4.5)
GLUCOSE SERPL-MCNC: 134 MG/DL (ref 65–99)
HBA1C MFR BLD: 6.2 % (ref 4.8–5.6)
POTASSIUM SERPL-SCNC: 4.4 MMOL/L (ref 3.5–5.2)
PROT SERPL-MCNC: 5.9 G/DL (ref 6–8.5)
SL AMB EGFR AFRICAN AMERICAN: 72 ML/MIN/1.73
SL AMB EGFR NON AFRICAN AMERICAN: 62 ML/MIN/1.73
SODIUM SERPL-SCNC: 139 MMOL/L (ref 134–144)

## 2020-12-03 ENCOUNTER — OFFICE VISIT (OUTPATIENT)
Dept: FAMILY MEDICINE CLINIC | Facility: CLINIC | Age: 70
End: 2020-12-03
Payer: COMMERCIAL

## 2020-12-03 VITALS
BODY MASS INDEX: 35.5 KG/M2 | DIASTOLIC BLOOD PRESSURE: 88 MMHG | WEIGHT: 188 LBS | RESPIRATION RATE: 20 BRPM | HEIGHT: 61 IN | SYSTOLIC BLOOD PRESSURE: 132 MMHG | HEART RATE: 56 BPM | TEMPERATURE: 98.4 F

## 2020-12-03 DIAGNOSIS — N28.9 KIDNEY DISORDER: ICD-10-CM

## 2020-12-03 DIAGNOSIS — K40.90 LEFT INGUINAL HERNIA: ICD-10-CM

## 2020-12-03 DIAGNOSIS — J30.9 ALLERGIC RHINITIS, UNSPECIFIED SEASONALITY, UNSPECIFIED TRIGGER: ICD-10-CM

## 2020-12-03 DIAGNOSIS — Z00.00 MEDICARE ANNUAL WELLNESS VISIT, SUBSEQUENT: Primary | ICD-10-CM

## 2020-12-03 DIAGNOSIS — M54.50 CHRONIC LOW BACK PAIN, UNSPECIFIED BACK PAIN LATERALITY, UNSPECIFIED WHETHER SCIATICA PRESENT: ICD-10-CM

## 2020-12-03 DIAGNOSIS — R73.01 IFG (IMPAIRED FASTING GLUCOSE): ICD-10-CM

## 2020-12-03 DIAGNOSIS — Z13.6 SCREENING FOR CARDIOVASCULAR, RESPIRATORY, AND GENITOURINARY DISEASES: ICD-10-CM

## 2020-12-03 DIAGNOSIS — G43.009 MIGRAINE WITHOUT AURA AND WITHOUT STATUS MIGRAINOSUS, NOT INTRACTABLE: ICD-10-CM

## 2020-12-03 DIAGNOSIS — G89.29 CHRONIC LOW BACK PAIN, UNSPECIFIED BACK PAIN LATERALITY, UNSPECIFIED WHETHER SCIATICA PRESENT: ICD-10-CM

## 2020-12-03 DIAGNOSIS — Z12.5 PROSTATE CANCER SCREENING: ICD-10-CM

## 2020-12-03 DIAGNOSIS — Z13.89 SCREENING FOR CARDIOVASCULAR, RESPIRATORY, AND GENITOURINARY DISEASES: ICD-10-CM

## 2020-12-03 DIAGNOSIS — Z13.83 SCREENING FOR CARDIOVASCULAR, RESPIRATORY, AND GENITOURINARY DISEASES: ICD-10-CM

## 2020-12-03 DIAGNOSIS — R91.8 ABNORMAL CT SCAN, LUNG: ICD-10-CM

## 2020-12-03 DIAGNOSIS — E66.9 OBESITY (BMI 30-39.9): ICD-10-CM

## 2020-12-03 DIAGNOSIS — I10 BENIGN ESSENTIAL HYPERTENSION: ICD-10-CM

## 2020-12-03 PROCEDURE — 1036F TOBACCO NON-USER: CPT | Performed by: FAMILY MEDICINE

## 2020-12-03 PROCEDURE — 3725F SCREEN DEPRESSION PERFORMED: CPT | Performed by: FAMILY MEDICINE

## 2020-12-03 PROCEDURE — 1160F RVW MEDS BY RX/DR IN RCRD: CPT | Performed by: FAMILY MEDICINE

## 2020-12-03 PROCEDURE — 1125F AMNT PAIN NOTED PAIN PRSNT: CPT | Performed by: FAMILY MEDICINE

## 2020-12-03 PROCEDURE — G0439 PPPS, SUBSEQ VISIT: HCPCS | Performed by: FAMILY MEDICINE

## 2020-12-03 PROCEDURE — 3008F BODY MASS INDEX DOCD: CPT | Performed by: FAMILY MEDICINE

## 2020-12-03 PROCEDURE — 1170F FXNL STATUS ASSESSED: CPT | Performed by: FAMILY MEDICINE

## 2020-12-03 PROCEDURE — 3079F DIAST BP 80-89 MM HG: CPT | Performed by: FAMILY MEDICINE

## 2020-12-03 PROCEDURE — 99214 OFFICE O/P EST MOD 30 MIN: CPT | Performed by: FAMILY MEDICINE

## 2020-12-03 PROCEDURE — 3075F SYST BP GE 130 - 139MM HG: CPT | Performed by: FAMILY MEDICINE

## 2020-12-03 RX ORDER — RAMIPRIL 10 MG/1
20 CAPSULE ORAL DAILY
Qty: 180 CAPSULE | Refills: 1 | Status: SHIPPED | OUTPATIENT
Start: 2020-12-03 | End: 2021-06-04 | Stop reason: SDUPTHER

## 2020-12-03 RX ORDER — PROPRANOLOL HYDROCHLORIDE 160 MG/1
160 CAPSULE, EXTENDED RELEASE ORAL DAILY
Qty: 90 CAPSULE | Refills: 1 | Status: SHIPPED | OUTPATIENT
Start: 2020-12-03 | End: 2021-05-25 | Stop reason: SDUPTHER

## 2020-12-03 RX ORDER — GABAPENTIN 300 MG/1
300 CAPSULE ORAL
Qty: 90 CAPSULE | Refills: 1 | Status: SHIPPED | OUTPATIENT
Start: 2020-12-03 | End: 2021-06-04 | Stop reason: SDUPTHER

## 2020-12-09 ENCOUNTER — PATIENT OUTREACH (OUTPATIENT)
Dept: CASE MANAGEMENT | Facility: OTHER | Age: 70
End: 2020-12-09

## 2020-12-10 ENCOUNTER — PATIENT OUTREACH (OUTPATIENT)
Dept: CASE MANAGEMENT | Facility: OTHER | Age: 70
End: 2020-12-10

## 2021-01-13 ENCOUNTER — HOSPITAL ENCOUNTER (OUTPATIENT)
Dept: RADIOLOGY | Facility: HOSPITAL | Age: 71
Discharge: HOME | End: 2021-01-13
Attending: FAMILY MEDICINE
Payer: COMMERCIAL

## 2021-01-13 DIAGNOSIS — R91.8 ABNORMAL CT SCAN, LUNG: ICD-10-CM

## 2021-01-13 DIAGNOSIS — N28.9 KIDNEY DISORDER: ICD-10-CM

## 2021-01-13 PROCEDURE — G1004 CDSM NDSC: HCPCS

## 2021-01-13 PROCEDURE — 71250 CT THORAX DX C-: CPT

## 2021-01-13 PROCEDURE — 76770 US EXAM ABDO BACK WALL COMP: CPT

## 2021-01-14 DIAGNOSIS — G43.009 MIGRAINE WITHOUT AURA AND WITHOUT STATUS MIGRAINOSUS, NOT INTRACTABLE: ICD-10-CM

## 2021-01-14 RX ORDER — SUMATRIPTAN 100 MG/1
100 TABLET, FILM COATED ORAL ONCE AS NEEDED
Qty: 9 TABLET | Refills: 0 | Status: SHIPPED | OUTPATIENT
Start: 2021-01-14 | End: 2021-04-21 | Stop reason: SDUPTHER

## 2021-01-18 ENCOUNTER — TELEPHONE (OUTPATIENT)
Dept: FAMILY MEDICINE CLINIC | Facility: CLINIC | Age: 71
End: 2021-01-18

## 2021-01-18 NOTE — TELEPHONE ENCOUNTER
Radiology called wanting to let Dr Ana Maria Lane know that patient has significant finding on CT of chest

## 2021-03-01 DIAGNOSIS — Z23 ENCOUNTER FOR IMMUNIZATION: ICD-10-CM

## 2021-03-17 ENCOUNTER — IMMUNIZATIONS (OUTPATIENT)
Dept: FAMILY MEDICINE CLINIC | Facility: HOSPITAL | Age: 71
End: 2021-03-17

## 2021-03-17 DIAGNOSIS — Z23 ENCOUNTER FOR IMMUNIZATION: Primary | ICD-10-CM

## 2021-03-17 PROCEDURE — 91301 SARS-COV-2 / COVID-19 MRNA VACCINE (MODERNA) 100 MCG: CPT

## 2021-03-17 PROCEDURE — 0011A SARS-COV-2 / COVID-19 MRNA VACCINE (MODERNA) 100 MCG: CPT

## 2021-03-25 DIAGNOSIS — I10 BENIGN ESSENTIAL HYPERTENSION: ICD-10-CM

## 2021-03-25 DIAGNOSIS — K21.9 GASTROESOPHAGEAL REFLUX DISEASE: ICD-10-CM

## 2021-03-25 DIAGNOSIS — R73.01 IFG (IMPAIRED FASTING GLUCOSE): ICD-10-CM

## 2021-03-25 RX ORDER — MONTELUKAST SODIUM 10 MG/1
10 TABLET ORAL
Qty: 90 TABLET | Refills: 0 | Status: SHIPPED | OUTPATIENT
Start: 2021-03-25 | End: 2021-06-04 | Stop reason: SDUPTHER

## 2021-04-16 ENCOUNTER — IMMUNIZATIONS (OUTPATIENT)
Dept: FAMILY MEDICINE CLINIC | Facility: HOSPITAL | Age: 71
End: 2021-04-16

## 2021-04-16 DIAGNOSIS — Z23 ENCOUNTER FOR IMMUNIZATION: Primary | ICD-10-CM

## 2021-04-16 PROCEDURE — 0012A SARS-COV-2 / COVID-19 MRNA VACCINE (MODERNA) 100 MCG: CPT

## 2021-04-16 PROCEDURE — 91301 SARS-COV-2 / COVID-19 MRNA VACCINE (MODERNA) 100 MCG: CPT

## 2021-04-21 DIAGNOSIS — G43.009 MIGRAINE WITHOUT AURA AND WITHOUT STATUS MIGRAINOSUS, NOT INTRACTABLE: ICD-10-CM

## 2021-04-21 RX ORDER — SUMATRIPTAN 100 MG/1
100 TABLET, FILM COATED ORAL ONCE AS NEEDED
Qty: 9 TABLET | Refills: 1 | Status: SHIPPED | OUTPATIENT
Start: 2021-04-21 | End: 2021-06-28 | Stop reason: SDUPTHER

## 2021-05-05 ENCOUNTER — PATIENT OUTREACH (OUTPATIENT)
Dept: CASE MANAGEMENT | Facility: OTHER | Age: 71
End: 2021-05-05

## 2021-05-05 NOTE — PROGRESS NOTES
[facility] Call:    Would the patient like to make an appointment? No  (Yes or No)    Does the patient have a smart device? Yes  (Yes or No)    Is the patient active with MyChart? Yes  (Yes or No)    Is the patient interested in completing the SDOH questionnaire? yes  (Yes or No)    Is the patient a smoker? NO  (Yes or No)    Is the patient interested in smoking cessation counseling? N/A  (Yes, No, or n/a)    Is the patient Diabetic? no  (Yes or No)    What is the patient's A1C? N/A  (equal or greater than 9 - refer to [address] [place] [address])    Who helps the patient manage their medications? Self    Does the patient have any issues in obtaining or affording medications? no  (Yes = Referral to [place], No, n/a)    Other barriers identified during the call: no    Summary of Call: This CHW spoke with patient  Patient is doing well  Patient's last appt with PCP was 03/17/21 and has a f/u appt scheduled for 06/10/21  Patient received 2nd  covid vaccine on 04/16/21  Patient states he is doing well with the vaccine and has been feeling great  Patient states he is able to get medications and has no problem paying for medication  Patient was agreeable to receiving SDOH questionnaire  Patient denied needing help with other resources at this time  This CHW provided the patient with my contact information  This CHW provided the patient with my contact information  The patient was agreeable to additional outreaches from this CHW  This CHW will follow up in three months

## 2021-05-21 ENCOUNTER — TELEPHONE (OUTPATIENT)
Dept: NEUROLOGY | Facility: CLINIC | Age: 71
End: 2021-05-21

## 2021-05-21 NOTE — TELEPHONE ENCOUNTER
THE [facility] for patient to Perla Perez and confirm appointment with [facility]  Gave direct numbers in [place]

## 2021-05-25 ENCOUNTER — OFFICE VISIT (OUTPATIENT)
Dept: NEUROLOGY | Facility: CLINIC | Age: 71
End: 2021-05-25
Payer: COMMERCIAL

## 2021-05-25 VITALS
DIASTOLIC BLOOD PRESSURE: 76 MMHG | WEIGHT: 184 LBS | SYSTOLIC BLOOD PRESSURE: 116 MMHG | HEART RATE: 46 BPM | BODY MASS INDEX: 34.74 KG/M2 | HEIGHT: 61 IN

## 2021-05-25 DIAGNOSIS — I10 BENIGN ESSENTIAL HYPERTENSION: ICD-10-CM

## 2021-05-25 DIAGNOSIS — G43.009 MIGRAINE WITHOUT AURA AND WITHOUT STATUS MIGRAINOSUS, NOT INTRACTABLE: Primary | ICD-10-CM

## 2021-05-25 PROCEDURE — 99214 OFFICE O/P EST MOD 30 MIN: CPT | Performed by: NURSE PRACTITIONER

## 2021-05-25 RX ORDER — PROPRANOLOL HYDROCHLORIDE 160 MG/1
160 CAPSULE, EXTENDED RELEASE ORAL DAILY
Qty: 90 CAPSULE | Refills: 1 | Status: SHIPPED | OUTPATIENT
Start: 2021-05-25 | End: 2021-12-06

## 2021-05-25 NOTE — PATIENT INSTRUCTIONS
Continue with Propanolol 160mg in am for migraine  Continue with Verapamil 180mg in pm for migraine  Can take Imitrex with baby aspirin at the start of Migraine activity  Follow up with outpatient Neurology in 6 months

## 2021-05-25 NOTE — PROGRESS NOTES
Patient ID: Jhony Castañeda is a 80 y o  male  Assessment/Plan:     Diagnoses and all orders for this visit:    Migraine without aura and without status migrainosus, not intractable  -     propranolol (INDERAL LA) 160 mg; Take 1 capsule (160 mg total) by mouth daily    Benign essential hypertension  -     propranolol (INDERAL LA) 160 mg; Take 1 capsule (160 mg total) by mouth daily       Continue with Propanolol 160mg in am for migraine  Continue with Verapamil 180mg in pm for migraine  Can take Imitrex with baby aspirin at the start of Migraine activity  Follow up with outpatient Neurology in 6 months    Subjective/HPI:  Roxane Duke a 61-year-old male treats with outpatient neurology for medical management of migraines  Patient been on propanolol 160 mg daily as well as verapamil 180 mg at bedtime for preventative medication and takes Imitrex with baby aspirin for breakthrough migraine  Patient currently reports having about 1 migraine per month, notes an increase in his migraine activity with severe weather changes  States that he does get some blurred vision at times however felt this was more related to cataracts and was to be seen by Ophthalmology  And a sister report that he did go to Ophthalmology, noted that his eyes are doing well and needed a new prescription  He is currently waiting to get new glasses  Patient did indicate that he did have treatment for an ulceration on his left eye which has since improved  With regards to his migraines, patient reports that they generally start in the left posterior portion of his head, notes that when the migraine is really bad it will radiate up to behind his left eyeball  States that he has had approximately 1 migraine per month since our last appointment, last bad migraine was in March  Stated he did take Imitrex with baby aspirin which did help although took a little bit longer vertigo way        Last office appointment, patient was having issues with regards to hypertension  He has been following with his PCP for medical management of this, continues on propanolol on verapamil for his migraines which should be assisting his blood pressures as well  Today blood pressure much improved, sister happy with his progress today  No further issues with regards to this at this time  Patient denies having any headache, dizziness, lightheadedness, visual disturbances, speech or swallowing difficulties, imbalances or falls  He is tolerating his medications well and is happy with his current migraine regimen  No further changes will be made at this time  Patient can follow up in the outpatient neurology office in approximately 6 months or sooner if needed        The following portions of the patient's history were reviewed and updated as appropriate: allergies, current medications, past family history, past medical history, past social history, past surgical history and problem list         Past Medical History:   Diagnosis Date    Arthritis     Chest pain 02/01/2006    Chronic pain     lower back    Closed fracture of one rib 07/27/2007    Diabetes mellitus [address] [address] )     boarderline diabetes    Environmental allergies     GERD (gastroesophageal reflux disease)     Hiatal hernia     Hyperlipidemia 01/05/2015    Hypertension     Migraine     Sebaceous cyst 01/11/2016       Past Surgical History:   Procedure Laterality Date    COLONOSCOPY         Social History     Socioeconomic History    Marital status: Single     Spouse name: None    Number of children: None    Years of education: None    Highest education level: None   Occupational History    None   Social Needs    Financial resource strain: None    Food insecurity     Worry: None     Inability: None    Transportation needs     Medical: None     Non-medical: None   Tobacco Use    Smoking status: Passive Smoke Exposure - Never Smoker    Smokeless tobacco: Never Used    Tobacco comment: from infancy    Substance and Sexual Activity    Alcohol use:  Yes     Alcohol/week: 4 0 standard drinks     Types: 4 Glasses of wine per week     Comment: social    Drug use: No    Sexual activity: None   Lifestyle    Physical activity     Days per week: None     Minutes per session: None    Stress: None   Relationships    Social connections     Talks on phone: None     Gets together: None     Attends Orthodox service: None     Active member of club or organization: None     Attends meetings of clubs or organizations: None     Relationship status: None    Intimate partner violence     Fear of current or ex partner: None     Emotionally abused: None     Physically abused: None     Forced sexual activity: None   Other Topics Concern    None   Social History Narrative    None       Family History   Problem Relation Age of Onset    Arthritis Mother     COPD Mother     Hypertension Mother     Lung cancer Father     Asthma Brother     Diabetes Sister     Hypertension Sister     Stomach cancer Brother          Current Outpatient Medications:     acetaminophen (TYLENOL ARTHRITIS PAIN) 650 mg CR tablet, Take 650 mg by mouth as needed  , Disp: , Rfl:     Ascorbic Acid (VITAMIN C) 500 MG CAPS, Take by mouth 2 (two) times a day , Disp: , Rfl:     aspirin (ECOTRIN LOW STRENGTH) 81 mg EC tablet, Take 81 mg by mouth daily , Disp: , Rfl:     clotrimazole-betamethasone (LOTRISONE) 1-0 05 % cream, Apply topically 2 (two) times a day Short term, right arm (Patient taking differently: Apply topically as needed Short term, right arm), Disp: 45 g, Rfl: 0    fluticasone (FLONASE) 50 mcg/act nasal spray, 1 spray into each nostril daily, Disp: , Rfl:     gabapentin (NEURONTIN) 300 mg capsule, Take 1 capsule (300 mg total) by mouth daily at bedtime, Disp: 90 capsule, Rfl: 1    Ibuprofen (ADVIL MIGRAINE) 200 MG CAPS, Take 600 mg by mouth as needed, Disp: , Rfl:     Magnesium Oxide 400 MG CAPS, Take 400 mg by mouth 2 (two) times a day , Disp: , Rfl:     montelukast (SINGULAIR) 10 mg tablet, Take 1 tablet (10 mg total) by mouth daily with breakfast, Disp: 90 tablet, Rfl: 0    Multiple Vitamin (MULTI-DAY PO), Take 1 tablet by mouth daily, Disp: , Rfl:     Multiple Vitamins-Minerals (PRESERVISION AREDS PO), Take by mouth daily, Disp: , Rfl:     mupirocin (BACTROBAN) 2 % cream, Apply topically 3 (three) times a day Prn, hands, Disp: 30 g, Rfl: 5    propranolol (INDERAL LA) 160 mg, Take 1 capsule (160 mg total) by mouth daily, Disp: 90 capsule, Rfl: 1    ramipril (ALTACE) 10 MG capsule, Take 2 capsules (20 mg total) by mouth daily, Disp: 180 capsule, Rfl: 1    SUMAtriptan (IMITREX) 100 mg tablet, Take 1 tablet (100 mg total) by mouth once as needed for migraine for up to 1 dose, Disp: 9 tablet, Rfl: 1    thiamine (VITAMIN B1) 100 mg tablet, Take by mouth daily, Disp: , Rfl:     umeclidinium-vilanterol (ANORO ELLIPTA) 62 5-25 MCG/INH inhaler, Inhale 1 puff daily, Disp: 1 Inhaler, Rfl: 11    verapamil (CALAN-SR) 180 mg CR tablet, Take 1 tablet (180 mg total) by mouth daily at bedtime, Disp: 90 tablet, Rfl: 1    albuterol (PROVENTIL HFA,VENTOLIN HFA) 90 mcg/act inhaler, Inhale 2 puffs every 6 (six) hours as needed for wheezing or shortness of breath (swish/spit after use) (Patient not taking: Reported on 9/23/2020), Disp: 1 Inhaler, Rfl: 5    pantoprazole (PROTONIX) 40 mg tablet, Take 1 tablet (40 mg total) by mouth daily (Patient not taking: Reported on 5/25/2021), Disp: 90 tablet, Rfl: 1    Allergies   Allergen Reactions    Other Sneezing     SEASONAL         Blood pressure 116/76, pulse (!) 46, height 5' 1" (1 549 m), weight 83 5 kg (184 lb)  Objective:    Blood pressure 116/76, pulse (!) 46, height 5' 1" (1 549 m), weight 83 5 kg (184 lb)  Physical Exam  Vitals signs reviewed  Constitutional:       Appearance: He is well-developed  HENT:      Head: Normocephalic        Right Ear: Hearing normal       Left Ear: Hearing normal       Nose: Nose normal       Mouth/Throat:      Mouth: Mucous membranes are moist    Eyes:      General: Lids are normal       Extraocular Movements: Extraocular movements intact  Conjunctiva/sclera: Conjunctivae normal       Pupils: Pupils are equal, round, and reactive to light  Neck:      Musculoskeletal: Normal range of motion  Cardiovascular:      Rate and Rhythm: Normal rate  Pulmonary:      Effort: Pulmonary effort is normal  No respiratory distress  Abdominal:      Palpations: Abdomen is soft  Tenderness: There is no abdominal tenderness  Musculoskeletal: Normal range of motion  Skin:     General: Skin is warm and dry  Neurological:      Mental Status: He is alert  Coordination: Romberg sign negative  Deep Tendon Reflexes: Strength normal and reflexes are normal and symmetric  Psychiatric:         Attention and Perception: Attention normal          Mood and Affect: Mood normal          Speech: Speech normal          Behavior: Behavior normal          Thought Content: Thought content normal          Cognition and Memory: Cognition normal          Judgment: Judgment normal          Neurological Exam  Mental Status  Alert  Oriented to person, place, time and situation  Recent and remote memory are intact  Speech is normal  Language is fluent with no aphasia  Attention and concentration are normal  Fund of knowledge is appropriate for level of education  Cranial Nerves  CN II: Visual acuity is normal  Visual fields full to confrontation  CN III, IV, VI: Extraocular movements intact bilaterally  Normal lids and orbits bilaterally  Pupils equal round and reactive to light bilaterally  CN V: Facial sensation is normal   CN VII: Full and symmetric facial movement  CN VIII: Hearing is normal   Right: Hearing is normal   Left: Hearing is normal   CN IX, X: Palate elevates symmetrically  Normal gag reflex    CN XI: Shoulder shrug strength is normal   CN XII: Tongue midline without atrophy or fasciculations  Motor  Normal muscle bulk throughout  Normal muscle tone  No abnormal involuntary movements  Strength is 5/5 throughout all four extremities  Sensory  Light touch is normal in upper and lower extremities  Temperature is normal in upper and lower extremities  Vibration is normal in upper and lower extremities  Proprioception is normal in upper and lower extremities  Reflexes  Deep tendon reflexes are 2+ and symmetric in all four extremities with downgoing toes bilaterally  Right pathological reflexes: Herbert's absent  Left pathological reflexes: Herbert's absent  Coordination  Right: Finger-to-nose normal  Rapid alternating movement normal  Heel-to-shin normal   Left: Finger-to-nose normal  Rapid alternating movement normal  Heel-to-shin normal     Gait  Casual gait is normal including stance, stride, and arm swing  Romberg is absent  Unable to rise from chair without using arms  ROS:    Review of Systems   Constitutional: Negative  Negative for appetite change and fever  HENT: Positive for hearing loss  Negative for tinnitus, trouble swallowing and voice change  Eyes: Negative  Negative for photophobia and pain  Respiratory: Negative  Negative for shortness of breath  Cardiovascular: Negative  Negative for palpitations  Gastrointestinal: Negative  Negative for nausea and vomiting  Endocrine: Negative  Negative for cold intolerance  Genitourinary: Negative  Negative for dysuria, frequency and urgency  Musculoskeletal: Negative  Negative for myalgias and neck pain  Skin: Negative  Negative for rash  Neurological: Positive for headaches  Negative for dizziness, tremors, seizures, syncope, facial asymmetry, speech difficulty, weakness, light-headedness and numbness  Hematological: Negative  Does not bruise/bleed easily  Psychiatric/Behavioral: Negative    Negative for confusion, hallucinations and sleep mandi GUERRERO reviewed with patient and his sister whom is his primary caregiver at this time

## 2021-05-28 LAB
ALBUMIN SERPL-MCNC: 4.2 G/DL (ref 3.8–4.8)
ALBUMIN/GLOB SERPL: 1.9 {RATIO} (ref 1.2–2.2)
ALP SERPL-CCNC: 109 IU/L (ref 48–121)
ALT SERPL-CCNC: 42 IU/L (ref 0–44)
AST SERPL-CCNC: 27 IU/L (ref 0–40)
BILIRUB SERPL-MCNC: 0.4 MG/DL (ref 0–1.2)
BUN SERPL-MCNC: 23 MG/DL (ref 8–27)
BUN/CREAT SERPL: 19 (ref 10–24)
CALCIUM SERPL-MCNC: 9.3 MG/DL (ref 8.6–10.2)
CHLORIDE SERPL-SCNC: 101 MMOL/L (ref 96–106)
CHOLEST SERPL-MCNC: 279 MG/DL (ref 100–199)
CO2 SERPL-SCNC: 20 MMOL/L (ref 20–29)
CREAT SERPL-MCNC: 1.19 MG/DL (ref 0.76–1.27)
GLOBULIN SER-MCNC: 2.2 G/DL (ref 1.5–4.5)
GLUCOSE SERPL-MCNC: 134 MG/DL (ref 65–99)
HBA1C MFR BLD: 6.2 % (ref 4.8–5.6)
HDLC SERPL-MCNC: 33 MG/DL
LDLC SERPL CALC-MCNC: ABNORMAL MG/DL (ref 0–99)
LDLC SERPL DIRECT ASSAY-MCNC: 117 MG/DL (ref 0–99)
MICRODELETION SYND BLD/T FISH: NORMAL
POTASSIUM SERPL-SCNC: 4.5 MMOL/L (ref 3.5–5.2)
PROT SERPL-MCNC: 6.4 G/DL (ref 6–8.5)
PSA SERPL-MCNC: 3.4 NG/ML (ref 0–4)
SL AMB EGFR AFRICAN AMERICAN: 71 ML/MIN/1.73
SL AMB EGFR NON AFRICAN AMERICAN: 62 ML/MIN/1.73
SODIUM SERPL-SCNC: 136 MMOL/L (ref 134–144)
TRIGL SERPL-MCNC: 836 MG/DL (ref 0–149)

## 2021-06-04 ENCOUNTER — OFFICE VISIT (OUTPATIENT)
Dept: FAMILY MEDICINE CLINIC | Facility: CLINIC | Age: 71
End: 2021-06-04
Payer: COMMERCIAL

## 2021-06-04 VITALS
OXYGEN SATURATION: 94 % | TEMPERATURE: 97.5 F | HEART RATE: 52 BPM | HEIGHT: 61 IN | BODY MASS INDEX: 35.12 KG/M2 | WEIGHT: 186 LBS | RESPIRATION RATE: 20 BRPM | SYSTOLIC BLOOD PRESSURE: 136 MMHG | DIASTOLIC BLOOD PRESSURE: 78 MMHG

## 2021-06-04 DIAGNOSIS — K21.9 GASTROESOPHAGEAL REFLUX DISEASE: ICD-10-CM

## 2021-06-04 DIAGNOSIS — M54.50 CHRONIC LOW BACK PAIN, UNSPECIFIED BACK PAIN LATERALITY, UNSPECIFIED WHETHER SCIATICA PRESENT: ICD-10-CM

## 2021-06-04 DIAGNOSIS — Z86.010 PERSONAL HISTORY OF COLONIC POLYPS: ICD-10-CM

## 2021-06-04 DIAGNOSIS — B35.6 TINEA CRURIS: ICD-10-CM

## 2021-06-04 DIAGNOSIS — R73.01 IFG (IMPAIRED FASTING GLUCOSE): ICD-10-CM

## 2021-06-04 DIAGNOSIS — E66.9 OBESITY (BMI 30-39.9): ICD-10-CM

## 2021-06-04 DIAGNOSIS — I10 BENIGN ESSENTIAL HYPERTENSION: Primary | ICD-10-CM

## 2021-06-04 DIAGNOSIS — G89.29 CHRONIC LOW BACK PAIN, UNSPECIFIED BACK PAIN LATERALITY, UNSPECIFIED WHETHER SCIATICA PRESENT: ICD-10-CM

## 2021-06-04 DIAGNOSIS — E78.1 HIGH TRIGLYCERIDES: ICD-10-CM

## 2021-06-04 DIAGNOSIS — L72.0 EPIDERMOID CYST OF SKIN: ICD-10-CM

## 2021-06-04 DIAGNOSIS — R73.09 ABNORMAL GLUCOSE: ICD-10-CM

## 2021-06-04 DIAGNOSIS — J30.9 ALLERGIC RHINITIS, UNSPECIFIED SEASONALITY, UNSPECIFIED TRIGGER: ICD-10-CM

## 2021-06-04 PROBLEM — Z86.0100 PERSONAL HISTORY OF COLONIC POLYPS: Status: ACTIVE | Noted: 2021-06-04

## 2021-06-04 PROBLEM — B35.4 TINEA CORPORIS: Status: RESOLVED | Noted: 2020-08-19 | Resolved: 2021-06-04

## 2021-06-04 PROCEDURE — 1160F RVW MEDS BY RX/DR IN RCRD: CPT | Performed by: FAMILY MEDICINE

## 2021-06-04 PROCEDURE — 3075F SYST BP GE 130 - 139MM HG: CPT | Performed by: FAMILY MEDICINE

## 2021-06-04 PROCEDURE — 3008F BODY MASS INDEX DOCD: CPT | Performed by: FAMILY MEDICINE

## 2021-06-04 PROCEDURE — 3725F SCREEN DEPRESSION PERFORMED: CPT | Performed by: FAMILY MEDICINE

## 2021-06-04 PROCEDURE — 1036F TOBACCO NON-USER: CPT | Performed by: FAMILY MEDICINE

## 2021-06-04 PROCEDURE — 3078F DIAST BP <80 MM HG: CPT | Performed by: FAMILY MEDICINE

## 2021-06-04 PROCEDURE — 99214 OFFICE O/P EST MOD 30 MIN: CPT | Performed by: FAMILY MEDICINE

## 2021-06-04 RX ORDER — CLOTRIMAZOLE AND BETAMETHASONE DIPROPIONATE 10; .64 MG/G; MG/G
CREAM TOPICAL 2 TIMES DAILY
Qty: 45 G | Refills: 5 | Status: SHIPPED | OUTPATIENT
Start: 2021-06-04 | End: 2021-09-05

## 2021-06-04 RX ORDER — GABAPENTIN 300 MG/1
300 CAPSULE ORAL 2 TIMES DAILY
Qty: 180 CAPSULE | Refills: 1 | Status: SHIPPED | OUTPATIENT
Start: 2021-06-04 | End: 2021-12-09 | Stop reason: SDUPTHER

## 2021-06-04 RX ORDER — OMEGA-3-ACID ETHYL ESTERS 1 G/1
2 CAPSULE, LIQUID FILLED ORAL 2 TIMES DAILY
Qty: 360 CAPSULE | Refills: 1 | Status: SHIPPED | OUTPATIENT
Start: 2021-06-04 | End: 2021-11-30

## 2021-06-04 RX ORDER — MONTELUKAST SODIUM 10 MG/1
10 TABLET ORAL
Qty: 90 TABLET | Refills: 0 | Status: SHIPPED | OUTPATIENT
Start: 2021-06-04 | End: 2021-09-02

## 2021-06-04 RX ORDER — RAMIPRIL 10 MG/1
20 CAPSULE ORAL DAILY
Qty: 180 CAPSULE | Refills: 1 | Status: SHIPPED | OUTPATIENT
Start: 2021-06-04 | End: 2021-12-09 | Stop reason: SDUPTHER

## 2021-06-04 NOTE — PROGRESS NOTES
Assessment/Plan:    No problem-specific Assessment & Plan notes found for this encounter  Htn stable but work on weight loss, diet, exercise  Cyst of skin on back and neck, offer dermatologist  Chronic LBP, ongoing, increase gabapentin 300mg qd to bid if tolerated, not on tramadol anymore  Glucose readings advised, needs to work on diet, sister aware of DM2 criteria  B/l inguinal tinea cruris, prevention discussed, lotrisone short term prn     Diagnoses and all orders for this visit:    Benign essential hypertension  -     ramipril (ALTACE) 10 MG capsule; Take 2 capsules (20 mg total) by mouth daily    Chronic low back pain, unspecified back pain laterality, unspecified whether sciatica present  -     gabapentin (NEURONTIN) 300 mg capsule; Take 1 capsule (300 mg total) by mouth 2 (two) times a day    Gastroesophageal reflux disease    IFG (impaired fasting glucose)    Epidermoid cyst of skin  -     Ambulatory referral to Dermatology; Future    Abnormal glucose    High triglycerides  -     omega-3-acid ethyl esters (LOVAZA) 1 g capsule; Take 2 capsules (2 g total) by mouth 2 (two) times a day  -     Comprehensive metabolic panel; Future  -     Lipid Panel with Direct LDL reflex; Future    Personal history of colonic polyps  -     Ambulatory referral to Gastroenterology; Future    Tinea cruris  -     clotrimazole-betamethasone (LOTRISONE) 1-0 05 % cream; Apply topically 2 (two) times a day Short term, sparingly to area: groin    Allergic rhinitis, unspecified seasonality, unspecified trigger  -     montelukast (SINGULAIR) 10 mg tablet; Take 1 tablet (10 mg total) by mouth daily with breakfast    Obesity (BMI 30-39  9)    BMI 35 0-35 9,adult              Return in about 3 months (around 9/4/2021) for Recheck  Subjective:      Patient ID: Constance Lewis is a 80 y o  male      Chief Complaint   Patient presents with    Follow-up     follow up on meds [id number]        HPI  Healthy breakfast   Wt about same  No soda  Herbal teas  Likes bread  Not walking    Ongoing back pain  No tramadol  Daytime back pain also    Rash/itch in groin  L>R  No bleeding  Worse in heat    The following portions of the patient's history were reviewed and updated as appropriate: allergies, current medications, past family history, past medical history, past social history, past surgical history and problem list     Review of Systems   Constitutional: Negative for fever  Respiratory: Negative for cough  Skin: Positive for rash           Current Outpatient Medications   Medication Sig Dispense Refill    acetaminophen (TYLENOL ARTHRITIS PAIN) 650 mg CR tablet Take 650 mg by mouth as needed        Ascorbic Acid (VITAMIN C) 500 MG CAPS Take by mouth 2 (two) times a day       aspirin (ECOTRIN LOW STRENGTH) 81 mg EC tablet Take 81 mg by mouth daily       clotrimazole-betamethasone (LOTRISONE) 1-0 05 % cream Apply topically 2 (two) times a day Short term, right arm (Patient taking differently: Apply topically as needed Short term, right arm) 45 g 0    fluticasone (FLONASE) 50 mcg/act nasal spray 1 spray into each nostril daily      gabapentin (NEURONTIN) 300 mg capsule Take 1 capsule (300 mg total) by mouth 2 (two) times a day 180 capsule 1    Ibuprofen (ADVIL MIGRAINE) 200 MG CAPS Take 600 mg by mouth as needed      Magnesium Oxide 400 MG CAPS Take 400 mg by mouth 2 (two) times a day       montelukast (SINGULAIR) 10 mg tablet Take 1 tablet (10 mg total) by mouth daily with breakfast 90 tablet 0    Multiple Vitamin (MULTI-DAY PO) Take 1 tablet by mouth daily      Multiple Vitamins-Minerals (PRESERVISION AREDS PO) Take by mouth daily      mupirocin (BACTROBAN) 2 % cream Apply topically 3 (three) times a day Prn, hands 30 g 5    propranolol (INDERAL LA) 160 mg Take 1 capsule (160 mg total) by mouth daily 90 capsule 1    ramipril (ALTACE) 10 MG capsule Take 2 capsules (20 mg total) by mouth daily 180 capsule 1    SUMAtriptan (IMITREX) 100 mg tablet Take 1 tablet (100 mg total) by mouth once as needed for migraine for up to 1 dose 9 tablet 1    thiamine (VITAMIN B1) 100 mg tablet Take by mouth daily      umeclidinium-vilanterol (ANORO ELLIPTA) 62 5-25 MCG/INH inhaler Inhale 1 puff daily 1 Inhaler 11    verapamil (CALAN-SR) 180 mg CR tablet Take 1 tablet (180 mg total) by mouth daily at bedtime 90 tablet 1    albuterol (PROVENTIL HFA,VENTOLIN HFA) 90 mcg/act inhaler Inhale 2 puffs every 6 (six) hours as needed for wheezing or shortness of breath (swish/spit after use) (Patient not taking: Reported on 9/23/2020) 1 Inhaler 5    clotrimazole-betamethasone (LOTRISONE) 1-0 05 % cream Apply topically 2 (two) times a day Short term, sparingly to area: groin 45 g 5    omega-3-acid ethyl esters (LOVAZA) 1 g capsule Take 2 capsules (2 g total) by mouth 2 (two) times a day 360 capsule 1     No current facility-administered medications for this visit  Objective:    /78   Pulse (!) 52   Temp 97 5 °F (36 4 °C)   Resp 20   Ht 5' 1" (1 549 m)   Wt 84 4 kg (186 lb)   SpO2 94%   BMI 35 14 kg/m²        Physical Exam  Vitals signs and nursing note reviewed  Constitutional:       Appearance: He is well-developed  He is obese  He is not ill-appearing  HENT:      Head: Normocephalic  Right Ear: External ear normal       Left Ear: External ear normal    Eyes:      General: No scleral icterus  Conjunctiva/sclera: Conjunctivae normal    Neck:      Musculoskeletal: Neck supple  Cardiovascular:      Rate and Rhythm: Normal rate and regular rhythm  Heart sounds: No murmur  Pulmonary:      Effort: Pulmonary effort is normal  No respiratory distress  Breath sounds: No wheezing  Abdominal:      Palpations: Abdomen is soft  Tenderness: There is no abdominal tenderness  There is no guarding  Musculoskeletal:         General: No deformity  Right lower leg: No edema  Left lower leg: No edema     Skin:     General: Skin is warm and dry  Coloration: Skin is not jaundiced or pale  Neurological:      Mental Status: He is alert  Motor: No weakness  Gait: Gait normal    Psychiatric:         Mood and Affect: Mood normal          Behavior: Behavior normal          Thought Content: Thought content normal        BMI Counseling: Body mass index is 35 14 kg/m²  The BMI is above normal  Nutrition recommendations include decreasing portion sizes and moderation in carbohydrate intake  Exercise recommendations include exercising 3-5 times per week  No pharmacotherapy was ordered                  Kandice Mccormack DO

## 2021-06-28 DIAGNOSIS — G43.009 MIGRAINE WITHOUT AURA AND WITHOUT STATUS MIGRAINOSUS, NOT INTRACTABLE: ICD-10-CM

## 2021-06-28 RX ORDER — SUMATRIPTAN 100 MG/1
100 TABLET, FILM COATED ORAL ONCE AS NEEDED
Qty: 9 TABLET | Refills: 1 | Status: SHIPPED | OUTPATIENT
Start: 2021-06-28 | End: 2021-09-15 | Stop reason: SDUPTHER

## 2021-08-03 ENCOUNTER — PATIENT OUTREACH (OUTPATIENT)
Dept: CASE MANAGEMENT | Facility: OTHER | Age: 71
End: 2021-08-03

## 2021-08-03 NOTE — PROGRESS NOTES
1st Attempt       [facility] Call:    Would the patient like to make an appointment? NO  (Yes or No)    Does the patient have a smart device? yES  (Yes or No)    Is the patient active with MyChart? yES  (Yes or No)    Is the patient interested in completing the SDOH questionnaire? nO  (Yes or No)    Is the patient a smoker? NO  (Yes or No)    Is the patient interested in smoking cessation counseling? No  (Yes, No, or n/a)    Is the patient Diabetic? No  (Yes or No)    What is the patient's A1C? N/A  (equal or greater than 9 - refer to [address] [place] [address])    Who helps the patient manage their medications? self    Does the patient have any issues in obtaining or affording medications? No  (Yes = Referral to [place], No, n/a)    Other barriers identified during the call: None    Summary of Call: This CHW spoke with patient on this day  Patient is doing well  Patient continue to not need any assistance with any resources  Patient continues to take medications as prescribed  Patient does not need any assistance with getting or paying for medications  Patient has a follow up schedule with his PCP on 09/22/21  This CHW provided the patient with my contact information  The patient was agreeable to additional outreaches from this CHW  This CHW will follow up in three months

## 2021-08-04 ENCOUNTER — CONSULT (OUTPATIENT)
Dept: GASTROENTEROLOGY | Facility: CLINIC | Age: 71
End: 2021-08-04
Payer: COMMERCIAL

## 2021-08-04 VITALS — BODY MASS INDEX: 36.63 KG/M2 | WEIGHT: 194 LBS | HEIGHT: 61 IN

## 2021-08-04 DIAGNOSIS — K64.1 GRADE II HEMORRHOIDS: ICD-10-CM

## 2021-08-04 DIAGNOSIS — K63.5 POLYP OF ASCENDING COLON, UNSPECIFIED TYPE: ICD-10-CM

## 2021-08-04 DIAGNOSIS — K44.9 HIATAL HERNIA: ICD-10-CM

## 2021-08-04 DIAGNOSIS — K21.9 GASTROESOPHAGEAL REFLUX DISEASE WITHOUT ESOPHAGITIS: ICD-10-CM

## 2021-08-04 DIAGNOSIS — Z86.010 PERSONAL HISTORY OF COLONIC POLYPS: Primary | ICD-10-CM

## 2021-08-04 DIAGNOSIS — K57.30 DIVERTICULOSIS OF LARGE INTESTINE WITHOUT PERFORATION OR ABSCESS WITHOUT BLEEDING: ICD-10-CM

## 2021-08-04 PROCEDURE — 99204 OFFICE O/P NEW MOD 45 MIN: CPT | Performed by: INTERNAL MEDICINE

## 2021-08-04 PROCEDURE — 1036F TOBACCO NON-USER: CPT | Performed by: INTERNAL MEDICINE

## 2021-08-04 PROCEDURE — 3008F BODY MASS INDEX DOCD: CPT | Performed by: INTERNAL MEDICINE

## 2021-08-04 RX ORDER — POLYETHYLENE GLYCOL 3350 17 G/17G
POWDER, FOR SOLUTION ORAL
Qty: 238 G | Refills: 0 | Status: SHIPPED | OUTPATIENT
Start: 2021-08-04 | End: 2021-09-22

## 2021-08-04 RX ORDER — MELOXICAM 7.5 MG/1
7.5 TABLET ORAL DAILY
COMMUNITY
Start: 2021-07-26 | End: 2021-12-09 | Stop reason: SDUPTHER

## 2021-08-04 RX ORDER — CHLORAL HYDRATE 500 MG
2 CAPSULE ORAL 2 TIMES DAILY
COMMUNITY
Start: 2021-06-04 | End: 2022-06-08

## 2021-08-04 NOTE — PROGRESS NOTES
[facility] - Outpatient Consultation  Zain Reyes 80 y o  male MRN: [id number]  Encounter: [id number]          ASSESSMENT AND PLAN:      1  Personal history of colonic polyps  -     Ambulatory referral to Gastroenterology    2  Gastroesophageal reflux disease without esophagitis    3  Diverticulosis of large intestine without perforation or abscess without bleeding    4  Polyp of ascending colon, unspecified type    5  Hiatal hernia    6  Grade II hemorrhoids      History of heartburn acid reflux indigestion GERD and hiatal hernia, anti-reflux measures reviewed diet discussed, schedule EGD  History of colon polyps, diverticulosis and hemorrhoids, bowel regimen discussed, encouraged him to lose some weight, schedule colonoscopy  Last colonoscopy in December of 2017  Discussed with patient's sister at length       ______________________________________________________________________    HPI:     Patient has history of occasional heartburn acid reflux indigestion, colon polyps removed more than 3 years ago, came in for evaluation  Denies any abdominal pain melena hematochezia GI bleeding chest pain shortness of breath fever chills rash coughing choking spells psych ENT  problems, denies any history of CVA seizures CAD  Has occasional acid reflux, denies nocturnal symptoms of bronchitis pneumonias  Diet medications more than 10 pertinent systems on the prior records an EGD colonoscopy biopsy results noted  REVIEW OF SYSTEMS:    CONSTITUTIONAL: Denies any fever, chills, rigors, and weight loss  HEENT: No earache or tinnitus  CARDIOVASCULAR: No chest pain or palpitations  RESPIRATORY: Denies any cough, hemoptysis, shortness of breath or dyspnea on exertion  GASTROINTESTINAL: As noted in the History of Present Illness  GENITOURINARY: Denies any hematuria or dysuria  NEUROLOGIC: No dizziness or vertigo  MUSCULOSKELETAL: Denies any joint swellings    SKIN: Denies skin rashes or itching  ENDOCRINE: Denies excessive thirst  Denies intolerance to heat or cold  PSYCHOSOCIAL: Denies depression or anxiety  Denies any recent memory loss         Historical Information   Past Medical History:   Diagnosis Date    Arthritis     Chest pain 02/01/2006    Chronic pain     lower back    Closed fracture of one rib 07/27/2007    Diabetes mellitus ([address] )     boarderline diabetes    Environmental allergies     GERD (gastroesophageal reflux disease)     Hiatal hernia     Hyperlipidemia 01/05/2015    Hypertension     Migraine     Sebaceous cyst 01/11/2016     Past Surgical History:   Procedure Laterality Date    COLONOSCOPY       Social History   Social History     Substance and Sexual Activity   Alcohol Use Yes    Alcohol/week: 4 0 standard drinks    Types: 4 Glasses of wine per week    Comment: social     Social History     Substance and Sexual Activity   Drug Use No     Social History     Tobacco Use   Smoking Status Passive Smoke Exposure - Never Smoker   Smokeless Tobacco Never Used   Tobacco Comment    from infancy      Family History   Problem Relation Age of Onset    Arthritis Mother     COPD Mother    Jael Porras Hypertension Mother    Jael Porras Lung cancer Father     Asthma Brother     Diabetes Sister     Hypertension Sister     Stomach cancer Brother        Meds/Allergies       Current Outpatient Medications:     acetaminophen (TYLENOL ARTHRITIS PAIN) 650 mg CR tablet    Ascorbic Acid (VITAMIN C) 500 MG CAPS    aspirin (ECOTRIN LOW STRENGTH) 81 mg EC tablet    clotrimazole-betamethasone (LOTRISONE) 1-0 05 % cream    fluticasone (FLONASE) 50 mcg/act nasal spray    gabapentin (NEURONTIN) 300 mg capsule    Ibuprofen (ADVIL MIGRAINE) 200 MG CAPS    Magnesium Oxide 400 MG CAPS    meloxicam (MOBIC) 7 5 mg tablet    montelukast (SINGULAIR) 10 mg tablet    Multiple Vitamin (MULTI-DAY PO)    Multiple Vitamins-Minerals (PRESERVISION AREDS PO)    mupirocin (BACTROBAN) 2 % cream    Omega-3 Fatty Acids (fish oil) 1,000 mg    omega-3-acid ethyl esters (LOVAZA) 1 g capsule    propranolol (INDERAL LA) 160 mg    ramipril (ALTACE) 10 MG capsule    SUMAtriptan (IMITREX) 100 mg tablet    thiamine (VITAMIN B1) 100 mg tablet    umeclidinium-vilanterol (ANORO ELLIPTA) 62 5-25 MCG/INH inhaler    verapamil (CALAN-SR) 180 mg CR tablet    albuterol (PROVENTIL HFA,VENTOLIN HFA) 90 mcg/act inhaler    clotrimazole-betamethasone (LOTRISONE) 1-0 05 % cream    Allergies   Allergen Reactions    Other Sneezing     SEASONAL   Other reaction(s): Sneezing  SEASONAL            Objective     Height 5' 1" (1 549 m), weight 88 kg (194 lb)  Body mass index is 36 66 kg/m²  PHYSICAL EXAM:      General Appearance:   Alert, cooperative, no distress   HEENT:   Normocephalic, atraumatic, anicteric  Neck:  Supple, symmetrical, trachea midline   Lungs:   Clear to auscultation bilaterally; no rales, rhonchi or wheezing; respirations unlabored    Heart[de-identified]   Regular rate and rhythm; no murmur  Abdomen:   Soft, non-tender, non-distended; normal bowel sounds; no masses, no organomegaly    Genitalia:   Deferred    Rectal:   Deferred    Extremities:  No cyanosis, clubbing or edema    Skin:  No jaundice, rashes, or lesions    Lymph nodes:  No palpable cervical lymphadenopathy        Lab Results:   No visits with results within 1 Day(s) from this visit     Latest known visit with results is:   Orders Only on 05/27/2021   Component Date Value    Glucose, Random 05/27/2021 134*    BUN 05/27/2021 23     Creatinine 05/27/2021 1 19     eGFR Non  05/27/2021 62     eGFR  05/27/2021 71     SL AMB BUN/CREATININE RA* 05/27/2021 19     Sodium 05/27/2021 136     Potassium 05/27/2021 4 5     Chloride 05/27/2021 101     CO2 05/27/2021 20     CALCIUM 05/27/2021 9 3     Protein, Total 05/27/2021 6 4     Albumin 05/27/2021 4 2     Globulin, Total 05/27/2021 2 2     Albumin/Globulin Ratio 05/27/2021 1 9     TOTAL BILIRUBIN 05/27/2021 0 4     Alk Phos Isoenzymes 05/27/2021 109     AST 05/27/2021 27     ALT 05/27/2021 42     Cholesterol, Total 05/27/2021 279*    Triglycerides 05/27/2021 836*    HDL 05/27/2021 33*    LDL Calculated 05/27/2021 Comment*    LDL Direct 05/27/2021 117*    Hemoglobin A1C 05/27/2021 6 2*    Prostate Specific Antige* 05/27/2021 3 4     Interpretation 05/27/2021 Note          Radiology Results:   No results found

## 2021-08-17 ENCOUNTER — OFFICE VISIT (OUTPATIENT)
Dept: OTOLARYNGOLOGY | Facility: CLINIC | Age: 71
End: 2021-08-17
Payer: COMMERCIAL

## 2021-08-17 VITALS — TEMPERATURE: 97.1 F

## 2021-08-17 DIAGNOSIS — H61.23 BILATERAL IMPACTED CERUMEN: Primary | ICD-10-CM

## 2021-08-17 DIAGNOSIS — H90.3 SENSORINEURAL HEARING LOSS (SNHL), BILATERAL: ICD-10-CM

## 2021-08-17 PROCEDURE — 69210 REMOVE IMPACTED EAR WAX UNI: CPT | Performed by: NURSE PRACTITIONER

## 2021-08-17 PROCEDURE — 99213 OFFICE O/P EST LOW 20 MIN: CPT | Performed by: NURSE PRACTITIONER

## 2021-08-17 PROCEDURE — 1160F RVW MEDS BY RX/DR IN RCRD: CPT | Performed by: NURSE PRACTITIONER

## 2021-08-17 PROCEDURE — 1036F TOBACCO NON-USER: CPT | Performed by: NURSE PRACTITIONER

## 2021-08-17 NOTE — PROGRESS NOTES
Assessment/Plan:    Bilateral impacted cerumen    On exam noted bilateral cerumen impaction and unable to fully view tympanic membrane  Cerumen impaction removed bilateral eac with alligator forceps and suction, pt tolerated procedure well  Upon removal, improved hearing and decreased clogged sensation of bilateral ears  Discussed routine cerumen care including avoidance of q-tips and cerumen softeners  Encourage ongoing follow up annually to monitor for cerumen and hearing  Audiogram if symptoms worsen  Sensorineural hearing loss (SNHL), bilateral  Continue binaural hearing amplification  Audiogram prn hearing worsening         Diagnoses and all orders for this visit:    Bilateral impacted cerumen    Sensorineural hearing loss (SNHL), bilateral    Other orders  -     Ear cerumen removal          Subjective:      Patient ID: Perlita Juarez is a 80 y o  male  Presents today for follow up due to bilateral hearing loss  Hearing gradually worsening  Bilateral hearing aids  During routine check for aids informed of wax blocking ears  No otalgia  No otorrhea  The following portions of the patient's history were reviewed and updated as appropriate: allergies, current medications, past family history, past medical history, past social history, past surgical history and problem list     Review of Systems   Constitutional: Negative  HENT: Positive for hearing loss  Negative for congestion, ear discharge, ear pain, nosebleeds, postnasal drip, rhinorrhea, sinus pressure, sinus pain, sore throat, tinnitus and voice change  Eyes: Negative  Respiratory: Negative for chest tightness and shortness of breath  Cardiovascular: Negative  Gastrointestinal: Negative  Endocrine: Negative  Musculoskeletal: Negative  Skin: Negative for color change  Neurological: Negative for dizziness, numbness and headaches  Psychiatric/Behavioral: Negative            Objective:      Temp (!) 97 1 °F (36 2 °C)          Physical Exam  Constitutional:       Appearance: He is well-developed  HENT:      Head: Normocephalic  Right Ear: Hearing, tympanic membrane, ear canal and external ear normal  No decreased hearing noted  No drainage or tenderness  There is impacted cerumen  Tympanic membrane is not perforated or erythematous  Left Ear: Hearing, tympanic membrane, ear canal and external ear normal  No decreased hearing noted  No drainage or tenderness  There is impacted cerumen  Tympanic membrane is not perforated or erythematous  Nose: Nose normal  No nasal deformity or septal deviation  Mouth/Throat:      Mouth: Mucous membranes are not pale and not dry  No oral lesions  Dentition: Normal dentition  Pharynx: Uvula midline  No oropharyngeal exudate  Neck:      Trachea: No tracheal deviation  Cardiovascular:      Rate and Rhythm: Normal rate  Pulmonary:      Effort: Pulmonary effort is normal  No accessory muscle usage or respiratory distress  Musculoskeletal:      Right shoulder: Normal range of motion  Cervical back: Full passive range of motion without pain, normal range of motion and neck supple  Lymphadenopathy:      Cervical: No cervical adenopathy  Skin:     General: Skin is warm and dry  Neurological:      Mental Status: He is alert and oriented to person, place, and time  Cranial Nerves: No cranial nerve deficit  Sensory: No sensory deficit  Psychiatric:         Behavior: Behavior is cooperative  Ear cerumen removal    Date/Time: 8/17/2021 1:45 PM  Performed by: YARELY Castorena  Authorized by: YARELY Castorena   Universal Protocol:  Consent: Verbal consent obtained    Risks and benefits: risks, benefits and alternatives were discussed  Consent given by: patient  Patient understanding: patient states understanding of the procedure being performed      Patient location:  Clinic  Procedure details:     Local anesthetic:  None    Location: L ear and R ear    Approach:  External  Post-procedure details:     Complication:  None    Hearing quality:  Normal    Patient tolerance of procedure:   Tolerated well, no immediate complications  Comments:      Bilateral cerumen impaction removed with suction #5

## 2021-08-17 NOTE — ASSESSMENT & PLAN NOTE
On exam noted bilateral cerumen impaction and unable to fully view tympanic membrane  Cerumen impaction removed bilateral eac with alligator forceps and suction, pt tolerated procedure well  Upon removal, improved hearing and decreased clogged sensation of bilateral ears  Discussed routine cerumen care including avoidance of q-tips and cerumen softeners  Encourage ongoing follow up annually to monitor for cerumen and hearing  Audiogram if symptoms worsen

## 2021-09-02 DIAGNOSIS — J30.9 ALLERGIC RHINITIS, UNSPECIFIED SEASONALITY, UNSPECIFIED TRIGGER: ICD-10-CM

## 2021-09-02 RX ORDER — MONTELUKAST SODIUM 10 MG/1
10 TABLET ORAL
Qty: 90 TABLET | Refills: 0 | Status: SHIPPED | OUTPATIENT
Start: 2021-09-02 | End: 2021-11-30

## 2021-09-09 LAB
ALBUMIN SERPL-MCNC: 4.2 G/DL (ref 3.8–4.8)
ALBUMIN/GLOB SERPL: 1.9 {RATIO} (ref 1.2–2.2)
ALP SERPL-CCNC: 117 IU/L (ref 48–121)
ALT SERPL-CCNC: 67 IU/L (ref 0–44)
AST SERPL-CCNC: 39 IU/L (ref 0–40)
BILIRUB SERPL-MCNC: 0.5 MG/DL (ref 0–1.2)
BUN SERPL-MCNC: 18 MG/DL (ref 8–27)
BUN/CREAT SERPL: 15 (ref 10–24)
CALCIUM SERPL-MCNC: 9.2 MG/DL (ref 8.6–10.2)
CHLORIDE SERPL-SCNC: 103 MMOL/L (ref 96–106)
CHOLEST SERPL-MCNC: 274 MG/DL (ref 100–199)
CO2 SERPL-SCNC: 20 MMOL/L (ref 20–29)
CREAT SERPL-MCNC: 1.17 MG/DL (ref 0.76–1.27)
GLOBULIN SER-MCNC: 2.2 G/DL (ref 1.5–4.5)
GLUCOSE SERPL-MCNC: 141 MG/DL (ref 65–99)
HDLC SERPL-MCNC: 34 MG/DL
LDLC SERPL CALC-MCNC: 136 MG/DL (ref 0–99)
MICRODELETION SYND BLD/T FISH: NORMAL
POTASSIUM SERPL-SCNC: 4.3 MMOL/L (ref 3.5–5.2)
PROT SERPL-MCNC: 6.4 G/DL (ref 6–8.5)
SL AMB EGFR AFRICAN AMERICAN: 73 ML/MIN/1.73
SL AMB EGFR NON AFRICAN AMERICAN: 63 ML/MIN/1.73
SODIUM SERPL-SCNC: 138 MMOL/L (ref 134–144)
TRIGL SERPL-MCNC: 559 MG/DL (ref 0–149)

## 2021-09-10 ENCOUNTER — OFFICE VISIT (OUTPATIENT)
Dept: FAMILY MEDICINE CLINIC | Facility: CLINIC | Age: 71
End: 2021-09-10
Payer: COMMERCIAL

## 2021-09-10 VITALS
DIASTOLIC BLOOD PRESSURE: 70 MMHG | OXYGEN SATURATION: 96 % | TEMPERATURE: 98.4 F | HEART RATE: 57 BPM | SYSTOLIC BLOOD PRESSURE: 138 MMHG | RESPIRATION RATE: 16 BRPM | HEIGHT: 61 IN | BODY MASS INDEX: 35.5 KG/M2 | WEIGHT: 188 LBS

## 2021-09-10 DIAGNOSIS — I10 BENIGN ESSENTIAL HYPERTENSION: Primary | ICD-10-CM

## 2021-09-10 DIAGNOSIS — R73.09 ABNORMAL GLUCOSE: ICD-10-CM

## 2021-09-10 DIAGNOSIS — E78.5 HYPERLIPIDEMIA LDL GOAL <130: ICD-10-CM

## 2021-09-10 PROBLEM — R73.01 IFG (IMPAIRED FASTING GLUCOSE): Status: RESOLVED | Noted: 2017-07-19 | Resolved: 2021-09-10

## 2021-09-10 PROCEDURE — 3078F DIAST BP <80 MM HG: CPT | Performed by: FAMILY MEDICINE

## 2021-09-10 PROCEDURE — 1160F RVW MEDS BY RX/DR IN RCRD: CPT | Performed by: FAMILY MEDICINE

## 2021-09-10 PROCEDURE — 3008F BODY MASS INDEX DOCD: CPT | Performed by: FAMILY MEDICINE

## 2021-09-10 PROCEDURE — 99214 OFFICE O/P EST MOD 30 MIN: CPT | Performed by: FAMILY MEDICINE

## 2021-09-10 PROCEDURE — 3075F SYST BP GE 130 - 139MM HG: CPT | Performed by: FAMILY MEDICINE

## 2021-09-10 PROCEDURE — 1036F TOBACCO NON-USER: CPT | Performed by: FAMILY MEDICINE

## 2021-09-10 RX ORDER — ATORVASTATIN CALCIUM 10 MG/1
10 TABLET, FILM COATED ORAL DAILY
Qty: 90 TABLET | Refills: 1 | Status: SHIPPED | OUTPATIENT
Start: 2021-09-10 | End: 2021-12-09 | Stop reason: SDUPTHER

## 2021-09-10 NOTE — PROGRESS NOTES
Assessment/Plan:    No problem-specific Assessment & Plan notes found for this encounter  Elevated glucose aware, will work harder on diet/exercise/wt    HLD stable, continue statin    htn stable on meds     Diagnoses and all orders for this visit:    Benign essential hypertension    Abnormal glucose  -     Comprehensive metabolic panel; Future  -     Hemoglobin A1C; Future    Hyperlipidemia LDL goal <130  -     Lipid Panel with Direct LDL reflex; Future  -     atorvastatin (LIPITOR) 10 mg tablet; Take 1 tablet (10 mg total) by mouth daily        Return in about 3 months (around 12/16/2021) for Recheck  Subjective:      Patient ID: Robert Billingsley is a 80 y o  male  Chief Complaint   Patient presents with    Follow-up     3 month mz cma       HPI  Starting to walk more lately  Wt same  Diet, avoids cold cuts  States he avoids carbs and dessert  Takes all his meds  Does not drink much    [facility] in [place] near [facility]  Ongoing back issues, given mobic by Dr Luigi Chau    The following portions of the patient's history were reviewed and updated as appropriate: allergies, current medications, past family history, past medical history, past social history, past surgical history and problem list     Review of Systems   Constitutional: Negative for fever  Respiratory: Negative for shortness of breath            Current Outpatient Medications   Medication Sig Dispense Refill    acetaminophen (TYLENOL ARTHRITIS PAIN) 650 mg CR tablet Take 650 mg by mouth as needed        Ascorbic Acid (VITAMIN C) 500 MG CAPS Take by mouth 2 (two) times a day       aspirin (ECOTRIN LOW STRENGTH) 81 mg EC tablet Take 81 mg by mouth daily       clotrimazole-betamethasone (LOTRISONE) 1-0 05 % cream Apply topically 2 (two) times a day Short term, right arm (Patient taking differently: Apply topically as needed Short term, right arm) 45 g 0    fluticasone (FLONASE) 50 mcg/act nasal spray 1 spray into each nostril daily      gabapentin (NEURONTIN) 300 mg capsule Take 1 capsule (300 mg total) by mouth 2 (two) times a day 180 capsule 1    Ibuprofen (ADVIL MIGRAINE) 200 MG CAPS Take 600 mg by mouth as needed      Magnesium Oxide 400 MG CAPS Take 400 mg by mouth 2 (two) times a day       meloxicam (MOBIC) 7 5 mg tablet Take 7 5 mg by mouth daily      montelukast (SINGULAIR) 10 mg tablet TAKE 1 TABLET (10 MG TOTAL) BY MOUTH DAILY WITH BREAKFAST 90 tablet 0    Multiple Vitamin (MULTI-DAY PO) Take 1 tablet by mouth daily      Multiple Vitamins-Minerals (PRESERVISION AREDS PO) Take by mouth daily      Omega-3 Fatty Acids (fish oil) 1,000 mg Take 2 g by mouth 2 (two) times a day      omega-3-acid ethyl esters (LOVAZA) 1 g capsule Take 2 capsules (2 g total) by mouth 2 (two) times a day 360 capsule 1    polyethylene glycol (MiraLax) 17 GM/SCOOP powder Day before procedure: clear liquid diet all day  At 2pm take 300ml of Magnesium Citrate  At 6pm start Miralax prep (238g Miralax mixed with 64 oz Gatorade)  238 g 0    propranolol (INDERAL LA) 160 mg Take 1 capsule (160 mg total) by mouth daily 90 capsule 1    ramipril (ALTACE) 10 MG capsule Take 2 capsules (20 mg total) by mouth daily 180 capsule 1    SUMAtriptan (IMITREX) 100 mg tablet Take 1 tablet (100 mg total) by mouth once as needed for migraine for up to 1 dose 9 tablet 1    thiamine (VITAMIN B1) 100 mg tablet Take by mouth daily      umeclidinium-vilanterol (ANORO ELLIPTA) 62 5-25 MCG/INH inhaler Inhale 1 puff daily 1 Inhaler 11    verapamil (CALAN-SR) 180 mg CR tablet Take 1 tablet (180 mg total) by mouth daily at bedtime 90 tablet 1    atorvastatin (LIPITOR) 10 mg tablet Take 1 tablet (10 mg total) by mouth daily 90 tablet 1    mupirocin (BACTROBAN) 2 % cream Apply topically 3 (three) times a day Prn, hands (Patient not taking: Reported on 9/10/2021) 30 g 5     No current facility-administered medications for this visit         Objective:    /70   Pulse 57   Temp 98 4 °F (36 9 °C)   Resp 16   Ht 5' 1" (1 549 m)   Wt 85 3 kg (188 lb)   SpO2 96%   BMI 35 52 kg/m²        Physical Exam  Vitals and nursing note reviewed  Constitutional:       Appearance: He is well-developed  He is obese  He is not ill-appearing  HENT:      Head: Normocephalic  Eyes:      General: No scleral icterus  Conjunctiva/sclera: Conjunctivae normal    Cardiovascular:      Rate and Rhythm: Normal rate and regular rhythm  Heart sounds: No murmur heard  Pulmonary:      Effort: Pulmonary effort is normal  No respiratory distress  Abdominal:      Palpations: Abdomen is soft  Musculoskeletal:         General: No deformity  Cervical back: Neck supple  Skin:     General: Skin is warm and dry  Coloration: Skin is not jaundiced or pale  Neurological:      Mental Status: He is alert  Gait: Gait normal    Psychiatric:         Behavior: Behavior normal          Thought Content:  Thought content normal                 Susan Moon DO

## 2021-09-15 DIAGNOSIS — G43.009 MIGRAINE WITHOUT AURA AND WITHOUT STATUS MIGRAINOSUS, NOT INTRACTABLE: ICD-10-CM

## 2021-09-15 RX ORDER — SUMATRIPTAN 100 MG/1
100 TABLET, FILM COATED ORAL ONCE AS NEEDED
Qty: 9 TABLET | Refills: 1 | Status: SHIPPED | OUTPATIENT
Start: 2021-09-15

## 2021-09-16 RX ORDER — OMEPRAZOLE 20 MG/1
20 TABLET, DELAYED RELEASE ORAL DAILY
COMMUNITY

## 2021-09-22 ENCOUNTER — ANESTHESIA (OUTPATIENT)
Dept: GASTROENTEROLOGY | Facility: AMBULATORY SURGERY CENTER | Age: 71
End: 2021-09-22

## 2021-09-22 ENCOUNTER — HOSPITAL ENCOUNTER (OUTPATIENT)
Dept: GASTROENTEROLOGY | Facility: AMBULATORY SURGERY CENTER | Age: 71
Discharge: HOME | End: 2021-09-22
Payer: COMMERCIAL

## 2021-09-22 ENCOUNTER — ANESTHESIA EVENT (OUTPATIENT)
Dept: GASTROENTEROLOGY | Facility: AMBULATORY SURGERY CENTER | Age: 71
End: 2021-09-22

## 2021-09-22 VITALS
WEIGHT: 188 LBS | HEIGHT: 61 IN | TEMPERATURE: 97.6 F | SYSTOLIC BLOOD PRESSURE: 157 MMHG | BODY MASS INDEX: 35.5 KG/M2 | RESPIRATION RATE: 18 BRPM | HEART RATE: 64 BPM | OXYGEN SATURATION: 94 % | DIASTOLIC BLOOD PRESSURE: 95 MMHG

## 2021-09-22 DIAGNOSIS — K44.9 HIATAL HERNIA: ICD-10-CM

## 2021-09-22 DIAGNOSIS — K21.9 GASTROESOPHAGEAL REFLUX DISEASE WITHOUT ESOPHAGITIS: ICD-10-CM

## 2021-09-22 DIAGNOSIS — Z86.010 PERSONAL HISTORY OF COLONIC POLYPS: ICD-10-CM

## 2021-09-22 PROCEDURE — 43239 EGD BIOPSY SINGLE/MULTIPLE: CPT | Performed by: INTERNAL MEDICINE

## 2021-09-22 PROCEDURE — 88305 TISSUE EXAM BY PATHOLOGIST: CPT | Performed by: PATHOLOGY

## 2021-09-22 PROCEDURE — 88342 IMHCHEM/IMCYTCHM 1ST ANTB: CPT | Performed by: PATHOLOGY

## 2021-09-22 PROCEDURE — 45385 COLONOSCOPY W/LESION REMOVAL: CPT | Performed by: INTERNAL MEDICINE

## 2021-09-22 PROCEDURE — 45380 COLONOSCOPY AND BIOPSY: CPT | Performed by: INTERNAL MEDICINE

## 2021-09-22 PROCEDURE — 00813 ANES UPR LWR GI NDSC PX: CPT | Performed by: NURSE ANESTHETIST, CERTIFIED REGISTERED

## 2021-09-22 RX ORDER — LIDOCAINE HYDROCHLORIDE 20 MG/ML
INJECTION, SOLUTION EPIDURAL; INFILTRATION; INTRACAUDAL; PERINEURAL AS NEEDED
Status: DISCONTINUED | OUTPATIENT
Start: 2021-09-22 | End: 2021-09-22

## 2021-09-22 RX ORDER — SODIUM CHLORIDE 9 MG/ML
30 INJECTION, SOLUTION INTRAVENOUS CONTINUOUS
Status: DISCONTINUED | OUTPATIENT
Start: 2021-09-22 | End: 2021-09-26 | Stop reason: HOSPADM

## 2021-09-22 RX ORDER — FOLIC ACID 0.8 MG
500 TABLET ORAL
COMMUNITY
End: 2021-12-09

## 2021-09-22 RX ORDER — PROPOFOL 10 MG/ML
INJECTION, EMULSION INTRAVENOUS AS NEEDED
Status: DISCONTINUED | OUTPATIENT
Start: 2021-09-22 | End: 2021-09-22

## 2021-09-22 RX ORDER — SODIUM CHLORIDE 9 MG/ML
20 INJECTION, SOLUTION INTRAVENOUS CONTINUOUS
Status: DISCONTINUED | OUTPATIENT
Start: 2021-09-22 | End: 2021-09-26 | Stop reason: HOSPADM

## 2021-09-22 RX ADMIN — PROPOFOL 50 MG: 10 INJECTION, EMULSION INTRAVENOUS at 12:37

## 2021-09-22 RX ADMIN — PROPOFOL 50 MG: 10 INJECTION, EMULSION INTRAVENOUS at 12:25

## 2021-09-22 RX ADMIN — PROPOFOL 50 MG: 10 INJECTION, EMULSION INTRAVENOUS at 12:29

## 2021-09-22 RX ADMIN — PROPOFOL 50 MG: 10 INJECTION, EMULSION INTRAVENOUS at 12:22

## 2021-09-22 RX ADMIN — PROPOFOL 20 MG: 10 INJECTION, EMULSION INTRAVENOUS at 12:20

## 2021-09-22 RX ADMIN — LIDOCAINE HYDROCHLORIDE 50 MG: 20 INJECTION, SOLUTION EPIDURAL; INFILTRATION; INTRACAUDAL; PERINEURAL at 12:17

## 2021-09-22 RX ADMIN — PROPOFOL 130 MG: 10 INJECTION, EMULSION INTRAVENOUS at 12:17

## 2021-09-22 RX ADMIN — SODIUM CHLORIDE: 9 INJECTION, SOLUTION INTRAVENOUS at 12:12

## 2021-09-22 RX ADMIN — PROPOFOL 50 MG: 10 INJECTION, EMULSION INTRAVENOUS at 12:32

## 2021-09-22 NOTE — H&P
History and Physical - [facility]  Belen Salazar 80 y o  male MRN: [id number]                  HPI: Belen Salazar is a 80y o  year old male who presents for history of reflux and polyps      REVIEW OF SYSTEMS: Per the HPI, and otherwise unremarkable      Historical Information   Past Medical History:   Diagnosis Date    Arthritis     Chest pain 02/01/2006    Chronic pain     lower back    Closed fracture of one rib 07/27/2007    Colon polyp     COPD (chronic obstructive pulmonary disease) (HCC)     Diabetes mellitus (HCC)     boarderline diabetes    Diverticulosis     Environmental allergies     GERD (gastroesophageal reflux disease)     Hiatal hernia     Nikolai (hard of hearing)     uses hearing aides    Hyperlipidemia 01/05/2015    Hypertension     Migraine     Sebaceous cyst 01/11/2016     Past Surgical History:   Procedure Laterality Date    COLONOSCOPY      EGD AND COLONOSCOPY      WISDOM TOOTH EXTRACTION       Social History   Social History     Substance and Sexual Activity   Alcohol Use Yes    Alcohol/week: 4 0 standard drinks    Types: 4 Glasses of wine per week    Comment: social     Social History     Substance and Sexual Activity   Drug Use No     Social History     Tobacco Use   Smoking Status Passive Smoke Exposure - Never Smoker   Smokeless Tobacco Never Used   Tobacco Comment    from infancy      Family History   Problem Relation Age of Onset    Arthritis Mother     COPD Mother    Kassy Jones Hypertension Mother    Kassy Jones Lung cancer Father     Asthma Brother     Cancer Brother         colon    Diabetes Sister     Hypertension Sister     Stomach cancer Brother        Meds/Allergies       Current Outpatient Medications:     acetaminophen (TYLENOL ARTHRITIS PAIN) 650 mg CR tablet    Ascorbic Acid (VITAMIN C PO)    Ascorbic Acid (VITAMIN C) 500 MG CAPS    aspirin (ECOTRIN LOW STRENGTH) 81 mg EC tablet    atorvastatin (LIPITOR) 10 mg tablet    Cyanocobalamin (VITAMIN B 12 PO)    fluticasone (FLONASE) 50 mcg/act nasal spray    gabapentin (NEURONTIN) 300 mg capsule    Magnesium 500 MG CAPS    Magnesium Oxide 400 MG CAPS    meloxicam (MOBIC) 7 5 mg tablet    montelukast (SINGULAIR) 10 mg tablet    Multiple Vitamin (MULTI-DAY PO)    Multiple Vitamins-Minerals (PRESERVISION AREDS PO)    mupirocin (BACTROBAN) 2 % cream    Omega-3 Fatty Acids (fish oil) 1,000 mg    omega-3-acid ethyl esters (LOVAZA) 1 g capsule    omeprazole (PriLOSEC OTC) 20 MG tablet    propranolol (INDERAL LA) 160 mg    ramipril (ALTACE) 10 MG capsule    SUMAtriptan (IMITREX) 100 mg tablet    thiamine (VITAMIN B1) 100 mg tablet    umeclidinium-vilanterol (ANORO ELLIPTA) 62 5-25 MCG/INH inhaler    verapamil (CALAN-SR) 180 mg CR tablet    clotrimazole-betamethasone (LOTRISONE) 1-0 05 % cream    Current Facility-Administered Medications:     sodium chloride 0 9 % infusion, 30 mL/hr, Intravenous, Continuous    sodium chloride 0 9 % infusion, 20 mL/hr, Intravenous, Continuous    Allergies   Allergen Reactions    Other Sneezing     SEASONAL   Other reaction(s): Sneezing  SEASONAL        Objective     BP (!) 187/112 Comment: 167/102  Pulse 81   Temp 97 6 °F (36 4 °C) (Temporal)   Resp 18   Ht 5' 1" (1 549 m)   Wt 85 3 kg (188 lb)   SpO2 97%   BMI 35 52 kg/m²       PHYSICAL EXAM    Gen: NAD  Head: NCAT  CV: RRR  CHEST: Clear  ABD: soft, NT/ND  EXT: no edema      ASSESSMENT/PLAN:  This is a 80y o  year old male here for EGD colonoscopy, and he is stable and optimized for his procedure

## 2021-09-22 NOTE — DISCHARGE INSTRUCTIONS
Upper Endoscopy and Colonoscopy   WHAT YOU NEED TO KNOW:   An upper endoscopy is also called an upper gastrointestinal (GI) endoscopy, or an esophagogastroduodenoscopy (EGD)  It is a procedure to examine the inside of your esophagus, stomach, and duodenum (first part of the small intestine) with a scope  You may feel bloated, gassy, or have some abdominal discomfort after your procedure  Your throat may be sore for 24 to 36 hours  You may burp or pass gas from air that is still inside your body  A colonoscopy is a procedure to examine the inside of your colon (intestine) with a scope  Polyps or tissue growths may have been removed during your colonoscopy  It is normal to feel bloated and to have some abdominal discomfort  You should be passing gas  If you have hemorrhoids or you had polyps removed, you may have a small amount of bleeding  DISCHARGE INSTRUCTIONS:   Seek care immediately if:   · You have sudden, severe abdominal pain  · You have problems swallowing  · You have a large amount of black, sticky bowel movements or blood in your bowel movements  · You have sudden trouble breathing  · You feel weak, lightheaded, or faint or your heart beats faster than normal for you  Contact your healthcare provider if:   · You have a fever and chills  · You have nausea or are vomiting  · Your abdomen is bloated or feels full and hard  · You have abdominal pain  · You have a large amount of black, sticky bowel movements or blood in your bowel movements  · You have not had a bowel movement for 3 days after your procedure  · You have rash or hives  · You have questions or concerns about your procedure  Activity:   ·       Do not lift, strain, or run for 24 hours after your procedure  ·       Rest after your procedure  You have been given medicine to relax you  Do not drive or make important decisions until the day after your procedure   Return to your normal activity as directed  ·       Relieve gas and discomfort from bloating by lying on your right side with a heating pad on your abdomen  You may need to take short walks to help the gas move out  Eat small meals until bloating is relieved  Follow up with your healthcare provider as directed: Write down your questions so you remember to ask them during your visits  If you take a blood thinner, please review the specific instructions from your endoscopist about when you should resume it  These can be found in the Recommendation and Your Medication list sections of this After Visit Summary  Hiatal Hernia   WHAT YOU NEED TO KNOW:   What is a hiatal hernia? A hiatal hernia is a condition that causes part of your stomach to bulge through the hiatus (small opening) in your diaphragm  The part of the stomach may move up and down, or it may get trapped above the diaphragm  What increases my risk for a hiatal hernia? The exact cause of a hiatal hernia is not known  You may have been born with a large hiatus  The following may increase your risk of a hiatal hernia:  · Obesity    · Older age    · Medical conditions such as diverticulosis or esophagitis    · Previous surgery of the esophagus or stomach or trauma such as from a motor vehicle accident    What are the types of hiatal hernia? · Type I (sliding hiatal hernia): A portion of the stomach slides in and out of the hiatus  This type is the most common and usually causes gastroesophageal reflux disease (GERD)  GERD occurs when the esophageal sphincter does not close properly and causes acid reflux  The esophageal sphincter is the lower muscle of the esophagus  · Type II (paraesophageal hiatal hernia):  Type II hiatal hernia forms when a part of the stomach squeezes through the hiatus and lies next to the esophagus      · Type III (combined):  Type III hiatal hernia is a combination of a sliding and a paraesophageal hiatal hernia  · Type IV (complex paraesophageal hiatal hernia): The whole stomach, the small and large bowels, spleen, pancreas, or liver is pushed up into the chest     What are the signs and symptoms of a hiatal hernia? The most common symptom is heartburn  This usually occurs after meals and spreads to your neck, jaw, or shoulder  You may have no signs or symptoms, or you may have any of the following:  · Abdominal pain, especially in the area just above your navel    · Bitter or acid taste in your mouth    · Trouble swallowing    · Coughing or hoarseness    · Chest pain or shortness of breath that occurs after eating    · Frequent burping or hiccups    · Uncomfortable feeling of fullness after eating    How is a hiatal hernia diagnosed? · An upper GI series test  includes x-rays of your esophagus, stomach, and your small intestines  It is also called a barium swallow test  You will be given barium (a chalky liquid) to drink before the pictures are taken  This liquid helps your stomach and intestines show up better on the x-rays  An upper GI series can show if you have an ulcer, a blocked intestine, or other problems  · An endoscopy  uses a scope to see the inside of your digestive tract  A scope is a long, bendable tube with a light on the end of it  A camera may be hooked to the scope to take pictures  How is a hiatal hernia treated? Treatment depends on the type of hiatal hernia you have and on your symptoms  You may not need any treatment  You may need any of the following:  · Medicines  may be given to relieve heartburn symptoms  These medicines help to decrease or block stomach acid  You may also be given medicines that help to tighten the esophageal sphincter  · Surgery  may be done when medicines cannot control your symptoms, or other problems are present  Your healthcare provider may also suggest surgery depending on the type of hernia you have   Your healthcare provider can put your stomach back into its normal location  He may make the hiatus (hole) smaller and anchor your stomach in your abdomen  Fundoplication is a surgery that wraps the upper part of the stomach around the esophageal sphincter to strengthen it  How can I manage symptoms? The following nutrition and lifestyle changes may be recommended to relieve symptoms of heartburn  · Avoid foods that make your symptoms worse  These may include spicy foods, fruit juices, alcohol, caffeine, chocolate, and mint  · Eat several small meals during the day  Small meals give your stomach less food to digest     · Avoid lying down and bending forward after you eat  Do not eat meals 2 to 3 hours before bedtime  This decreases your risk for reflux  · Maintain a healthy weight  If you are overweight, weight loss may help relieve your symptoms  · Sleep with your head elevated  at least 6 inches  · Do not smoke  Smoking can increase your symptoms of heartburn  When should I seek immediate care? · You have severe abdominal pain  · You try to vomit but nothing comes out (retching)  · You have severe chest pain and sudden trouble breathing  · Your bowel movements are black or bloody  · Your vomit looks like coffee grounds or has blood in it  When should I contact my healthcare provider? · Your symptoms are getting worse  · You have nausea, and you are vomiting  · You are losing weight without trying  · You have questions or concerns about your condition or care  CARE AGREEMENT:   You have the right to help plan your care  Learn about your health condition and how it may be treated  Discuss treatment options with your healthcare providers to decide what care you want to receive  You always have the right to refuse treatment  The above information is an  only  It is not intended as medical advice for individual conditions or treatments   Talk to your doctor, nurse or pharmacist before following any medical regimen to see if it is safe and effective for you  © Copyright [employer] 2021 Information is for End User's use only and may not be sold, redistributed or otherwise used for commercial purposes  All illustrations and images included in CareNotes® are the copyrighted property of [employer]  or Jody Ellis  GERD (Gastroesophageal Reflux Disease)   WHAT YOU NEED TO KNOW:   Gastroesophageal reflux disease (GERD) is reflux that occurs more than twice a week for a few weeks  Reflux means acid and food in the stomach back up into the esophagus  It usually causes heartburn and other symptoms  GERD can cause other health problems over time if it is not treated  DISCHARGE INSTRUCTIONS:   Call your local emergency number (911 in the [address] [facility]) if:   · You have severe chest pain and sudden trouble breathing  Seek care immediately if:   · You have trouble breathing after you vomit  · You have trouble swallowing, or pain with swallowing  · Your bowel movements are black, bloody, or tarry-looking  · Your vomit looks like coffee grounds or has blood in it  Call your doctor or gastroenterologist if:   · You feel full and cannot burp or vomit  · You vomit large amounts, or you vomit often  · You are losing weight without trying  · Your symptoms get worse or do not improve with treatment  · You have questions or concerns about your condition or care  Medicines:   · Medicines  are used to decrease stomach acid  Medicine may also be used to help your lower esophageal sphincter and stomach contract (tighten) more  · Take your medicine as directed  Contact your healthcare provider if you think your medicine is not helping or if you have side effects  Tell him or her if you are allergic to any medicine  Keep a list of the medicines, vitamins, and herbs you take  Include the amounts, and when and why you take them  Bring the list or the pill bottles to follow-up visits  Carry your medicine list with you in case of an emergency  Manage GERD:       · Do not have foods or drinks that may increase heartburn  These include chocolate, peppermint, fried or fatty foods, drinks that contain caffeine, or carbonated drinks (soda)  Other foods include spicy foods, onions, tomatoes, and tomato-based foods  Do not have foods or drinks that can irritate your esophagus, such as citrus fruits, juices, and alcohol  · Do not eat large meals  When you eat a lot of food at one time, your stomach needs more acid to digest it  Eat 6 small meals each day instead of 3 large ones, and eat slowly  Do not eat meals 2 to 3 hours before bedtime  · Elevate the head of your bed  Place 6-inch blocks under the head of your bed frame  You may also use more than one pillow under your head and shoulders while you sleep  · Maintain a healthy weight  If you are overweight, weight loss may help relieve symptoms of GERD  · Do not smoke  Smoking weakens the lower esophageal sphincter and increases the risk of GERD  Ask your healthcare provider for information if you currently smoke and need help to quit  E-cigarettes or smokeless tobacco still contain nicotine  Talk to your healthcare provider before you use these products  · Do not wear clothing that is tight around your waist   Tight clothing can put pressure on your stomach and cause or worsen GERD symptoms  Follow up with your doctor or gastroenterologist as directed:  Write down your questions so you remember to ask them during your visits  © Copyright [employer] 2021 Information is for End User's use only and may not be sold, redistributed or otherwise used for commercial purposes  All illustrations and images included in CareNotes® are the copyrighted property of [employer]  or [facility] Lisa Miller   The above information is an  only  It is not intended as medical advice for individual conditions or treatments   Talk to your doctor, nurse or pharmacist before following any medical regimen to see if it is safe and effective for you  High Fiber Diet   WHAT YOU NEED TO KNOW:   What is a high-fiber diet? A high-fiber diet includes foods that have a high amount of fiber  Fiber is the part of fruits, vegetables, and grains that is not broken down by your body  Fiber keeps your bowel movements regular  Fiber can also help lower your cholesterol level, control blood sugar in people with diabetes, and relieve constipation  Fiber can also help you control your weight because it helps you feel full faster  Most adults should eat 25 to 35 grams of fiber each day  Talk to your dietitian or healthcare provider about the amount of fiber you need  What foods are good sources of fiber? · Foods with at least 4 grams of fiber per serving:      ? ? to ½ cup of high-fiber cereal (check the nutrition label on the box)    ? ½ cup of blackberries or raspberries    ? 4 dried prunes    ? 1 cooked artichoke    ? ½ cup of cooked legumes, such as lentils, or red, kidney, and brody beans    · Foods with 1 to 3 grams of fiber per serving:      ? 1 slice of whole-wheat, pumpernickel, or rye bread    ? ½ cup of cooked brown rice    ? 4 whole-wheat crackers    ? 1 cup of oatmeal    ? ½ cup of cereal with 1 to 3 grams of fiber per serving (check the nutrition label on the box)    ? 1 small piece of fruit, such as an apple, banana, pear, kiwi, or orange    ? 3 dates    ? ½ cup of canned apricots, fruit cocktail, peaches, or pears    ? ½ cup of raw or cooked vegetables, such as carrots, cauliflower, cabbage, spinach, squash, or corn  What are some ways that I can increase fiber in my diet? · Choose brown or wild rice instead of white rice  · Use whole wheat flour in recipes instead of white or all-purpose flour  · Add beans and peas to casseroles or soups  · Choose fresh fruit and vegetables with peels or skins on instead of juices      What other guidelines should I follow? · Add fiber to your diet slowly  You may have abdominal discomfort, bloating, and gas if you add fiber to your diet too quickly  · Drink plenty of liquids as you add fiber to your diet  You may have nausea or develop constipation if you do not drink enough water  Ask how much liquid to drink each day and which liquids are best for you  CARE AGREEMENT:   You have the right to help plan your care  Discuss treatment options with your healthcare provider to decide what care you want to receive  You always have the right to refuse treatment  The above information is an  only  It is not intended as medical advice for individual conditions or treatments  Talk to your doctor, nurse or pharmacist before following any medical regimen to see if it is safe and effective for you  © Copyright [employer] 2021 Information is for End User's use only and may not be sold, redistributed or otherwise used for commercial purposes  All illustrations and images included in CareNotes® are the copyrighted property of [employer]  or [facility] Lisa Miller   Diverticulosis   WHAT YOU NEED TO KNOW:   What is diverticulosis? Diverticulosis is a condition that causes small pockets called diverticula to form in your intestine  These pockets make it difficult for bowel movements to pass through your digestive system  What causes diverticulosis? Diverticula form when muscles have to work hard to move bowel movements through the intestine  The force causes bulges to form at weak areas in the intestine  This may happen if you eat foods that are low in fiber  Fiber helps give your bowel movements more bulk so they are larger and easier to move through your colon  The following may increase your risk of diverticulosis:  · A history of constipation    · Age 36 or older    · Obesity    · Lack of exercise    What are the signs and symptoms of diverticulosis?   Diverticulosis usually does not cause any signs or symptoms  It may cause any of the following in some people:  · Pain or discomfort in your lower abdomen    · Abdominal bloating    · Constipation or diarrhea    How is diverticulosis diagnosed? Your healthcare provider will examine you and ask about your bowel movements, diet, and symptoms  He or she will also ask about any medical conditions you have or medicines you take  You may need any of the following:  · Blood tests  may be done to check for signs of inflammation  · A barium enema  is an x-ray of your colon that may show diverticula  A tube is put into your anus, and a liquid called barium is put through the tube  Barium is used so that healthcare providers can see your colon more clearly  · Flexible sigmoidoscopy  is a test to look for any changes in your lower intestines and rectum  It may also show the cause of any bleeding or pain  A soft, bendable tube with a light on the end will be put into your anus  It will then be moved forward into your intestine  · A colonoscopy  is used to look at your whole colon  A scope (long bendable tube with a light on the end) is used to take pictures  This test may show diverticula  · A CT scan , or CAT scan, may show diverticula  You may be given contrast liquid before the scan  Tell the healthcare provider if you have ever had an allergic reaction to contrast liquid  How is diverticulosis managed? The goal of treatment is to manage any symptoms you have and prevent other problems such as diverticulitis  Diverticulitis is swelling or infection of the diverticula  Your healthcare provider may recommend any of the following:  · Eat a variety of high-fiber foods  High-fiber foods help you have regular bowel movements  High-fiber foods include cooked beans, fruits, vegetables, and some cereals  Most adults need 25 to 35 grams of fiber each day  Your healthcare provider may recommend that you have more   Ask your healthcare provider how much fiber you need  Increase fiber slowly  You may have abdominal discomfort, bloating, and gas if you add fiber to your diet too quickly  You may need to take a fiber supplement if you are not getting enough fiber from food  · Medicines  to soften your bowel movements may be given  You may also need medicines to treat symptoms such as bloating and pain  · Drink liquids as directed  You may need to drink 2 to 3 liters (8 to 12 cups) of liquids every day  Ask your healthcare provider how much liquid to drink each day and which liquids are best for you  · Apply heat  on your abdomen for 20 to 30 minutes every 2 hours for as many days as directed  Heat helps decrease pain and muscle spasms  How can I help prevent diverticulitis or other symptoms? The following may help decrease your risk for diverticulitis or symptoms, such as bleeding  Talk to your provider about these or other things you can do to prevent problems that may occur with diverticulosis  · Exercise regularly  Ask your healthcare provider about the best exercise plan for you  Exercise can help you have regular bowel movements  Get 30 minutes of exercise on most days of the week  · Maintain a healthy weight  Ask your healthcare provider how much you should weigh  Ask him or her to help you create a weight loss plan if you are overweight  · Do not smoke  Nicotine and other chemicals in cigarettes increase your risk for diverticulitis  Ask your healthcare provider for information if you currently smoke and need help to quit  E-cigarettes or smokeless tobacco still contain nicotine  Talk to your healthcare provider before you use these products  · Ask your healthcare provider if it is safe to take NSAIDs  NSAIDs may increase your risk of diverticulitis  When should I seek immediate care? · You have severe pain on the left side of your lower abdomen  · Your bowel movements are bright or dark red      When should I contact my healthcare provider? · You have a fever and chills  · You feel dizzy or lightheaded  · You have nausea, or you are vomiting  · You have a change in your bowel movements  · You have questions or concerns about your condition or care  CARE AGREEMENT:   You have the right to help plan your care  Learn about your health condition and how it may be treated  Discuss treatment options with your healthcare providers to decide what care you want to receive  You always have the right to refuse treatment  The above information is an  only  It is not intended as medical advice for individual conditions or treatments  Talk to your doctor, nurse or pharmacist before following any medical regimen to see if it is safe and effective for you  © Copyright [employer] 2021 Information is for End User's use only and may not be sold, redistributed or otherwise used for commercial purposes  All illustrations and images included in CareNotes® are the copyrighted property of [employer]  or [facility] Lisa Miller   Colorectal Polyps   WHAT YOU NEED TO KNOW:   What are colorectal polyps? Colorectal polyps are small growths of tissue in the lining of the colon and rectum  Most polyps are usually benign (not cancer)  Certain types of polyps, called adenomatous polyps, may turn into cancer  What increases my risk for colorectal polyps? The exact cause of colorectal polyps is unknown  The following may increase your risk:  · Older age    · Foods high in fat and low in fiber    · Family history of polyps    · Intestinal diseases, such as Crohn disease or ulcerative colitis    · Smoking cigarettes or drinking alcohol    · Lack of physical activity, such as exercise    · Obesity    What are the signs and symptoms of colorectal polyps?    · Blood in your bowel movement or bleeding from the rectum    · Change in bowel movement habits, such as diarrhea or constipation    · Abdominal pain    How are colorectal polyps diagnosed? You should have fecal blood screening 1 time each year for colorectal disease if you are older than 50 years  You should be screened earlier if you have an intestinal disease or a family history of polyps or colorectal cancer  During this screening, a sample of your bowel movement is checked for blood, which may be an early sign of colorectal polyps or cancer  You may also need any of the following tests:  · A digital rectal exam  means your healthcare provider will use a finger to check for polyps  · A barium enema  is an x-ray of the colon  A tube is put into your anus, and a liquid called barium is put through the tube  Barium is used so that healthcare providers can see your colon better on the x-ray film  · A virtual colonoscopy  is a CT scan that takes pictures of the inside of your colon and rectum  A small, flexible tube is put into your rectum and air or carbon dioxide (gas) is used to expand your colon  This lets healthcare providers clearly see your colon and any polyps on a monitor  · Colonoscopy or sigmoidoscopy  are procedures to help your healthcare provider see the inside of your colon  He or she will use a flexible tube with a small light and camera on the end  During a sigmoidoscopy, your healthcare provider will only look at rectum and lower colon  During a colonoscopy, he or she will look at the full length of your colon  A small amount of tissue may be removed from your colon for tests  How are colorectal polyps treated? Small, benign polyps may not need treatment  Your healthcare provider will check the polyp over time to make sure it is not changing  Polyps that are cancer may be removed with one of the following:  · A polypectomy  is a minimally invasive procedure to remove a polyp during a colonoscopy or sigmoidoscopy  Your healthcare provider may need to remove the polyp with a laparoscope   Laparoscopy is done by inserting a small, flexible scope into incisions made on your abdomen  · Surgery  may be needed to remove large or deep polyps that cannot be removed in a polypectomy  Tissues or lymph nodes near a polyp may also be removed  This helps stop cancer from spreading  What can I do to lower my risk for colorectal polyps? · Eat a variety of healthy foods  Healthy foods include fruit, vegetables, whole-grain breads, low-fat dairy products, beans, lean meat, and fish  Ask if you need to be on a special diet  · Maintain a healthy weight  Ask your healthcare provider what a healthy weight is for you  Ask him or her to help with a weight loss plan if you are overweight  · Exercise as directed  Begin to exercise slowly and do more as you get stronger  Talk with your healthcare provider before you start an exercise program          · Limit alcohol  Your risk for polyps increases the more you drink  · Do not smoke  Nicotine and other chemicals in cigarettes and cigars increase your risk for polyps  Ask your healthcare provider for information if you currently smoke and need help to quit  E-cigarettes or smokeless tobacco still contain nicotine  Talk to your healthcare provider before you use these products  Where can I find more information? · Brandy Novoa ([facility])  [address] [place] [id number]  Phone: [phone]  Web Address: Yue Landa  [facility] gov    Call your local emergency number (911 in the [address] [place] [address],[address]) if:   · You have sudden shortness of breath  · You have a fast heart rate, fast breathing, or are too dizzy to stand up  When should I seek immediate care? · You have severe abdominal pain  · You see blood in your bowel movement  When should I call my doctor? · You have a fever  · You have chills, a cough, or feel weak and achy  · You have abdominal pain that does not go away or gets worse after you take medicine      · Your abdomen is swollen  · You are losing weight without trying  · You have questions or concerns about your condition or care  CARE AGREEMENT:   You have the right to help plan your care  Learn about your health condition and how it may be treated  Discuss treatment options with your healthcare providers to decide what care you want to receive  You always have the right to refuse treatment  The above information is an  only  It is not intended as medical advice for individual conditions or treatments  Talk to your doctor, nurse or pharmacist before following any medical regimen to see if it is safe and effective for you  © Copyright [employer] 2021 Information is for End User's use only and may not be sold, redistributed or otherwise used for commercial purposes   All illustrations and images included in CareNotes® are the copyrighted property of A D A M , Inc  or [address] [place] [address]

## 2021-09-22 NOTE — ANESTHESIA POSTPROCEDURE EVALUATION
Post-Op Assessment Note    CV Status:  Stable  Pain Score: 0    Pain management: adequate     Mental Status:  Alert and awake   Hydration Status:  Stable   PONV Controlled:  None   Airway Patency:  Patent      Post Op Vitals Reviewed: Yes      Staff: CRNA         No complications documented      /74 (09/22/21 1245)    Temp      Pulse 68 (09/22/21 1245)   Resp 18 (09/22/21 1245)    SpO2 96 % (09/22/21 1245)

## 2021-09-22 NOTE — ANESTHESIA PREPROCEDURE EVALUATION
Procedure:  COLONOSCOPY  EGD    Relevant Problems   ANESTHESIA (within normal limits)      CARDIO   (+) Benign essential hypertension   (+) Dyspnea on exertion   (+) High triglycerides   (+) Hyperlipidemia LDL goal <130      ENDO (within normal limits)      GI/HEPATIC   (+) GE reflux   (+) Gastroesophageal reflux disease   (+) Hiatal hernia      /RENAL   (+) Benign prostatic hyperplasia   (+) Kidney disorder      GYN (within normal limits)      HEMATOLOGY (within normal limits)      MUSCULOSKELETAL (within normal limits)      NEURO/PSYCH   (+) Personal history of colonic polyps   (+) Situational anxiety      PULMONARY   (+) COPD (chronic obstructive pulmonary disease) (HCC)   (+) Dyspnea on exertion   (+) SOB (shortness of breath)      Other   (+) Benign colon polyp   (+) Cervical radiculopathy   (+) Diabetes mellitus (HCC)   (+) Diverticulosis of large intestine without perforation or abscess without bleeding   (+) Obesity (BMI 30-39  9)        Physical Exam    Airway    Mallampati score: II  TM Distance: >3 FB  Neck ROM: full     Dental   No notable dental hx     Cardiovascular  Cardiovascular exam normal    Pulmonary  Decreased breath sounds,     Other Findings        Anesthesia Plan  ASA Score- 3     Anesthesia Type- IV sedation with anesthesia with ASA Monitors  Additional Monitors:   Airway Plan:           Plan Factors-Exercise tolerance (METS): <4 METS  Exercise comment: SOB with exertion  Chart reviewed  Patient is not a current smoker  Induction- intravenous  Postoperative Plan-     Informed Consent- Anesthetic plan and risks discussed with patient

## 2021-09-29 DIAGNOSIS — R06.00 DYSPNEA ON EXERTION: ICD-10-CM

## 2021-10-13 DIAGNOSIS — G43.009 MIGRAINE WITHOUT AURA AND WITHOUT STATUS MIGRAINOSUS, NOT INTRACTABLE: ICD-10-CM

## 2021-10-19 ENCOUNTER — OFFICE VISIT (OUTPATIENT)
Dept: PULMONOLOGY | Facility: MEDICAL CENTER | Age: 71
End: 2021-10-19
Payer: COMMERCIAL

## 2021-10-19 VITALS
BODY MASS INDEX: 34.48 KG/M2 | SYSTOLIC BLOOD PRESSURE: 138 MMHG | HEIGHT: 61 IN | HEART RATE: 56 BPM | OXYGEN SATURATION: 96 % | TEMPERATURE: 97.2 F | DIASTOLIC BLOOD PRESSURE: 72 MMHG | WEIGHT: 182.6 LBS | RESPIRATION RATE: 12 BRPM

## 2021-10-19 DIAGNOSIS — R93.89 ABNORMAL CHEST CT: Primary | ICD-10-CM

## 2021-10-19 DIAGNOSIS — Z87.09 HISTORY OF BRONCHITIS: ICD-10-CM

## 2021-10-19 DIAGNOSIS — R53.81 PHYSICAL DECONDITIONING: ICD-10-CM

## 2021-10-19 DIAGNOSIS — R06.00 DYSPNEA ON EXERTION: ICD-10-CM

## 2021-10-19 PROCEDURE — 3008F BODY MASS INDEX DOCD: CPT | Performed by: PHYSICIAN ASSISTANT

## 2021-10-19 PROCEDURE — 94618 PULMONARY STRESS TESTING: CPT | Performed by: PHYSICIAN ASSISTANT

## 2021-10-19 PROCEDURE — 1160F RVW MEDS BY RX/DR IN RCRD: CPT | Performed by: PHYSICIAN ASSISTANT

## 2021-10-19 PROCEDURE — 3075F SYST BP GE 130 - 139MM HG: CPT | Performed by: PHYSICIAN ASSISTANT

## 2021-10-19 PROCEDURE — 3078F DIAST BP <80 MM HG: CPT | Performed by: PHYSICIAN ASSISTANT

## 2021-10-19 PROCEDURE — 99214 OFFICE O/P EST MOD 30 MIN: CPT | Performed by: PHYSICIAN ASSISTANT

## 2021-10-19 PROCEDURE — 1036F TOBACCO NON-USER: CPT | Performed by: PHYSICIAN ASSISTANT

## 2021-11-01 ENCOUNTER — PATIENT OUTREACH (OUTPATIENT)
Dept: CASE MANAGEMENT | Facility: OTHER | Age: 71
End: 2021-11-01

## 2021-11-23 ENCOUNTER — IMMUNIZATIONS (OUTPATIENT)
Dept: FAMILY MEDICINE CLINIC | Facility: HOSPITAL | Age: 71
End: 2021-11-23

## 2021-11-23 DIAGNOSIS — Z23 ENCOUNTER FOR IMMUNIZATION: Primary | ICD-10-CM

## 2021-11-23 PROCEDURE — 0064A COVID-19 MODERNA VACC 0.25 ML BOOSTER: CPT

## 2021-11-23 PROCEDURE — 91306 COVID-19 MODERNA VACC 0.25 ML BOOSTER: CPT

## 2021-11-30 DIAGNOSIS — E78.1 HIGH TRIGLYCERIDES: ICD-10-CM

## 2021-11-30 DIAGNOSIS — J30.9 ALLERGIC RHINITIS, UNSPECIFIED SEASONALITY, UNSPECIFIED TRIGGER: ICD-10-CM

## 2021-11-30 RX ORDER — MONTELUKAST SODIUM 10 MG/1
10 TABLET ORAL
Qty: 90 TABLET | Refills: 1 | Status: SHIPPED | OUTPATIENT
Start: 2021-11-30

## 2021-11-30 RX ORDER — OMEGA-3-ACID ETHYL ESTERS 1 G/1
2 CAPSULE, LIQUID FILLED ORAL 2 TIMES DAILY
Qty: 360 CAPSULE | Refills: 1 | Status: SHIPPED | OUTPATIENT
Start: 2021-11-30 | End: 2022-06-08

## 2021-12-03 DIAGNOSIS — Z87.09 HISTORY OF BRONCHITIS: ICD-10-CM

## 2021-12-03 DIAGNOSIS — R06.00 DYSPNEA ON EXERTION: ICD-10-CM

## 2021-12-06 DIAGNOSIS — G43.009 MIGRAINE WITHOUT AURA AND WITHOUT STATUS MIGRAINOSUS, NOT INTRACTABLE: ICD-10-CM

## 2021-12-06 DIAGNOSIS — I10 BENIGN ESSENTIAL HYPERTENSION: ICD-10-CM

## 2021-12-06 RX ORDER — PROPRANOLOL HYDROCHLORIDE 160 MG/1
160 CAPSULE, EXTENDED RELEASE ORAL DAILY
Qty: 90 CAPSULE | Refills: 1 | Status: SHIPPED | OUTPATIENT
Start: 2021-12-06 | End: 2022-06-13

## 2021-12-06 RX ORDER — UMECLIDINIUM BROMIDE AND VILANTEROL TRIFENATATE 62.5; 25 UG/1; UG/1
1 POWDER RESPIRATORY (INHALATION) DAILY
Qty: 60 BLISTER | Refills: 5 | Status: SHIPPED | OUTPATIENT
Start: 2021-12-06 | End: 2022-08-02

## 2021-12-08 LAB
ALBUMIN SERPL-MCNC: 4.2 G/DL (ref 3.8–4.8)
ALBUMIN/GLOB SERPL: 1.8 {RATIO} (ref 1.2–2.2)
ALP SERPL-CCNC: 119 IU/L (ref 44–121)
ALT SERPL-CCNC: 66 IU/L (ref 0–44)
AST SERPL-CCNC: 39 IU/L (ref 0–40)
BILIRUB SERPL-MCNC: 0.4 MG/DL (ref 0–1.2)
BUN SERPL-MCNC: 23 MG/DL (ref 8–27)
BUN/CREAT SERPL: 19 (ref 10–24)
CALCIUM SERPL-MCNC: 9.2 MG/DL (ref 8.6–10.2)
CHLORIDE SERPL-SCNC: 103 MMOL/L (ref 96–106)
CHOLEST SERPL-MCNC: 212 MG/DL (ref 100–199)
CO2 SERPL-SCNC: 22 MMOL/L (ref 20–29)
CREAT SERPL-MCNC: 1.21 MG/DL (ref 0.76–1.27)
GLOBULIN SER-MCNC: 2.3 G/DL (ref 1.5–4.5)
GLUCOSE SERPL-MCNC: 142 MG/DL (ref 65–99)
HBA1C MFR BLD: 6.8 % (ref 4.8–5.6)
HDLC SERPL-MCNC: 41 MG/DL
LDLC SERPL CALC-MCNC: 104 MG/DL (ref 0–99)
MICRODELETION SYND BLD/T FISH: NORMAL
POTASSIUM SERPL-SCNC: 4.9 MMOL/L (ref 3.5–5.2)
PROT SERPL-MCNC: 6.5 G/DL (ref 6–8.5)
SL AMB EGFR AFRICAN AMERICAN: 70 ML/MIN/1.73
SL AMB EGFR NON AFRICAN AMERICAN: 60 ML/MIN/1.73
SODIUM SERPL-SCNC: 139 MMOL/L (ref 134–144)
TRIGL SERPL-MCNC: 393 MG/DL (ref 0–149)

## 2021-12-09 ENCOUNTER — OFFICE VISIT (OUTPATIENT)
Dept: FAMILY MEDICINE CLINIC | Facility: CLINIC | Age: 71
End: 2021-12-09
Payer: COMMERCIAL

## 2021-12-09 VITALS
SYSTOLIC BLOOD PRESSURE: 118 MMHG | TEMPERATURE: 97.9 F | RESPIRATION RATE: 16 BRPM | HEART RATE: 67 BPM | DIASTOLIC BLOOD PRESSURE: 70 MMHG | HEIGHT: 61 IN | WEIGHT: 191 LBS | BODY MASS INDEX: 36.06 KG/M2 | OXYGEN SATURATION: 95 %

## 2021-12-09 DIAGNOSIS — M54.50 CHRONIC LOW BACK PAIN, UNSPECIFIED BACK PAIN LATERALITY, UNSPECIFIED WHETHER SCIATICA PRESENT: ICD-10-CM

## 2021-12-09 DIAGNOSIS — Z12.5 PROSTATE CANCER SCREENING: ICD-10-CM

## 2021-12-09 DIAGNOSIS — E78.5 HYPERLIPIDEMIA LDL GOAL <130: ICD-10-CM

## 2021-12-09 DIAGNOSIS — E78.1 HIGH TRIGLYCERIDES: ICD-10-CM

## 2021-12-09 DIAGNOSIS — I10 BENIGN ESSENTIAL HYPERTENSION: ICD-10-CM

## 2021-12-09 DIAGNOSIS — M53.9 SPINE DISORDER: ICD-10-CM

## 2021-12-09 DIAGNOSIS — G89.29 CHRONIC LOW BACK PAIN, UNSPECIFIED BACK PAIN LATERALITY, UNSPECIFIED WHETHER SCIATICA PRESENT: ICD-10-CM

## 2021-12-09 DIAGNOSIS — R73.09 ABNORMAL GLUCOSE: ICD-10-CM

## 2021-12-09 DIAGNOSIS — Z00.00 MEDICARE ANNUAL WELLNESS VISIT, SUBSEQUENT: Primary | ICD-10-CM

## 2021-12-09 PROCEDURE — G0439 PPPS, SUBSEQ VISIT: HCPCS | Performed by: FAMILY MEDICINE

## 2021-12-09 PROCEDURE — 99214 OFFICE O/P EST MOD 30 MIN: CPT | Performed by: FAMILY MEDICINE

## 2021-12-09 PROCEDURE — 1125F AMNT PAIN NOTED PAIN PRSNT: CPT | Performed by: FAMILY MEDICINE

## 2021-12-09 PROCEDURE — 1160F RVW MEDS BY RX/DR IN RCRD: CPT | Performed by: FAMILY MEDICINE

## 2021-12-09 PROCEDURE — 3725F SCREEN DEPRESSION PERFORMED: CPT | Performed by: FAMILY MEDICINE

## 2021-12-09 PROCEDURE — 1170F FXNL STATUS ASSESSED: CPT | Performed by: FAMILY MEDICINE

## 2021-12-09 PROCEDURE — 3008F BODY MASS INDEX DOCD: CPT | Performed by: FAMILY MEDICINE

## 2021-12-09 PROCEDURE — 1036F TOBACCO NON-USER: CPT | Performed by: FAMILY MEDICINE

## 2021-12-09 PROCEDURE — 1101F PT FALLS ASSESS-DOCD LE1/YR: CPT | Performed by: FAMILY MEDICINE

## 2021-12-09 PROCEDURE — 3288F FALL RISK ASSESSMENT DOCD: CPT | Performed by: FAMILY MEDICINE

## 2021-12-09 PROCEDURE — 3074F SYST BP LT 130 MM HG: CPT | Performed by: FAMILY MEDICINE

## 2021-12-09 PROCEDURE — 3078F DIAST BP <80 MM HG: CPT | Performed by: FAMILY MEDICINE

## 2021-12-09 RX ORDER — GABAPENTIN 300 MG/1
300 CAPSULE ORAL 2 TIMES DAILY
Qty: 180 CAPSULE | Refills: 1 | Status: SHIPPED | OUTPATIENT
Start: 2021-12-09 | End: 2022-06-08

## 2021-12-09 RX ORDER — RAMIPRIL 10 MG/1
20 CAPSULE ORAL DAILY
Qty: 180 CAPSULE | Refills: 1 | Status: SHIPPED | OUTPATIENT
Start: 2021-12-09 | End: 2022-06-08

## 2021-12-09 RX ORDER — MELOXICAM 7.5 MG/1
7.5 TABLET ORAL DAILY
Qty: 90 TABLET | Refills: 1 | Status: SHIPPED | OUTPATIENT
Start: 2021-12-09

## 2021-12-09 RX ORDER — ATORVASTATIN CALCIUM 10 MG/1
10 TABLET, FILM COATED ORAL DAILY
Qty: 90 TABLET | Refills: 1 | Status: SHIPPED | OUTPATIENT
Start: 2021-12-09

## 2022-01-31 ENCOUNTER — TELEPHONE (OUTPATIENT)
Dept: FAMILY MEDICINE CLINIC | Facility: CLINIC | Age: 72
End: 2022-01-31

## 2022-01-31 DIAGNOSIS — G89.29 CHRONIC LOW BACK PAIN, UNSPECIFIED BACK PAIN LATERALITY, UNSPECIFIED WHETHER SCIATICA PRESENT: Primary | ICD-10-CM

## 2022-01-31 DIAGNOSIS — M54.50 CHRONIC LOW BACK PAIN, UNSPECIFIED BACK PAIN LATERALITY, UNSPECIFIED WHETHER SCIATICA PRESENT: Primary | ICD-10-CM

## 2022-01-31 NOTE — TELEPHONE ENCOUNTER
Larry's sister, Josh Turcios calling asking for a script for their local [facility]  She would like a script for water physical therapy  She stated is they have a script, they can send to insurance company to see if this will be covered    Please call Josh Turcios at [phone]    [facility]

## 2022-03-23 LAB
ALBUMIN SERPL-MCNC: 4.2 G/DL (ref 3.7–4.7)
ALBUMIN/GLOB SERPL: 1.8 {RATIO} (ref 1.2–2.2)
ALP SERPL-CCNC: 182 IU/L (ref 44–121)
ALT SERPL-CCNC: 40 IU/L (ref 0–44)
AST SERPL-CCNC: 26 IU/L (ref 0–40)
BILIRUB SERPL-MCNC: 0.4 MG/DL (ref 0–1.2)
BUN SERPL-MCNC: 24 MG/DL (ref 8–27)
BUN/CREAT SERPL: 21 (ref 10–24)
CALCIUM SERPL-MCNC: 9.4 MG/DL (ref 8.6–10.2)
CHLORIDE SERPL-SCNC: 102 MMOL/L (ref 96–106)
CO2 SERPL-SCNC: 20 MMOL/L (ref 20–29)
CREAT SERPL-MCNC: 1.16 MG/DL (ref 0.76–1.27)
EGFR: 67 ML/MIN/1.73
GLOBULIN SER-MCNC: 2.4 G/DL (ref 1.5–4.5)
GLUCOSE SERPL-MCNC: 156 MG/DL (ref 65–99)
HBA1C MFR BLD: 7.2 % (ref 4.8–5.6)
POTASSIUM SERPL-SCNC: 5.3 MMOL/L (ref 3.5–5.2)
PROT SERPL-MCNC: 6.6 G/DL (ref 6–8.5)
SODIUM SERPL-SCNC: 138 MMOL/L (ref 134–144)

## 2022-04-11 ENCOUNTER — TELEPHONE (OUTPATIENT)
Dept: FAMILY MEDICINE CLINIC | Facility: CLINIC | Age: 72
End: 2022-04-11

## 2022-04-11 NOTE — TELEPHONE ENCOUNTER
Sw sister  Has been exercising at [facility] past 5w  Offered recheck a1c in 3m before next appt to decide if needs to start DM meds  agreeable

## 2022-04-23 DIAGNOSIS — G43.009 MIGRAINE WITHOUT AURA AND WITHOUT STATUS MIGRAINOSUS, NOT INTRACTABLE: ICD-10-CM

## 2022-06-07 ENCOUNTER — APPOINTMENT (OUTPATIENT)
Dept: LAB | Facility: CLINIC | Age: 72
End: 2022-06-07
Payer: COMMERCIAL

## 2022-06-07 DIAGNOSIS — R73.09 ABNORMAL GLUCOSE: ICD-10-CM

## 2022-06-07 LAB
ALBUMIN SERPL BCP-MCNC: 3.6 G/DL (ref 3.5–5)
ALP SERPL-CCNC: 133 U/L (ref 46–116)
ALT SERPL W P-5'-P-CCNC: 75 U/L (ref 12–78)
ANION GAP SERPL CALCULATED.3IONS-SCNC: 5 MMOL/L (ref 4–13)
AST SERPL W P-5'-P-CCNC: 38 U/L (ref 5–45)
BILIRUB SERPL-MCNC: 0.55 MG/DL (ref 0.2–1)
BUN SERPL-MCNC: 21 MG/DL (ref 5–25)
CALCIUM SERPL-MCNC: 9.5 MG/DL (ref 8.3–10.1)
CHLORIDE SERPL-SCNC: 108 MMOL/L (ref 100–108)
CO2 SERPL-SCNC: 26 MMOL/L (ref 21–32)
CREAT SERPL-MCNC: 1.27 MG/DL (ref 0.6–1.3)
EST. AVERAGE GLUCOSE BLD GHB EST-MCNC: 151 MG/DL
GFR SERPL CREATININE-BSD FRML MDRD: 56 ML/MIN/1.73SQ M
GLUCOSE P FAST SERPL-MCNC: 157 MG/DL (ref 65–99)
HBA1C MFR BLD: 6.9 %
POTASSIUM SERPL-SCNC: 4.9 MMOL/L (ref 3.5–5.3)
PROT SERPL-MCNC: 7 G/DL (ref 6.4–8.2)
SODIUM SERPL-SCNC: 139 MMOL/L (ref 136–145)

## 2022-06-07 PROCEDURE — 36415 COLL VENOUS BLD VENIPUNCTURE: CPT

## 2022-06-07 PROCEDURE — 83036 HEMOGLOBIN GLYCOSYLATED A1C: CPT

## 2022-06-07 PROCEDURE — 80053 COMPREHEN METABOLIC PANEL: CPT

## 2022-06-08 DIAGNOSIS — I10 BENIGN ESSENTIAL HYPERTENSION: ICD-10-CM

## 2022-06-08 DIAGNOSIS — G89.29 CHRONIC LOW BACK PAIN, UNSPECIFIED BACK PAIN LATERALITY, UNSPECIFIED WHETHER SCIATICA PRESENT: ICD-10-CM

## 2022-06-08 DIAGNOSIS — M54.50 CHRONIC LOW BACK PAIN, UNSPECIFIED BACK PAIN LATERALITY, UNSPECIFIED WHETHER SCIATICA PRESENT: ICD-10-CM

## 2022-06-08 DIAGNOSIS — E78.1 HIGH TRIGLYCERIDES: ICD-10-CM

## 2022-06-08 RX ORDER — RAMIPRIL 10 MG/1
CAPSULE ORAL
Qty: 180 CAPSULE | Refills: 1 | Status: SHIPPED | OUTPATIENT
Start: 2022-06-08

## 2022-06-08 RX ORDER — GABAPENTIN 300 MG/1
CAPSULE ORAL
Qty: 180 CAPSULE | Refills: 1 | Status: SHIPPED | OUTPATIENT
Start: 2022-06-08

## 2022-06-08 RX ORDER — OMEGA-3-ACID ETHYL ESTERS 1 G/1
CAPSULE, LIQUID FILLED ORAL
Qty: 360 CAPSULE | Refills: 1 | Status: SHIPPED | OUTPATIENT
Start: 2022-06-08

## 2022-06-11 DIAGNOSIS — I10 BENIGN ESSENTIAL HYPERTENSION: ICD-10-CM

## 2022-06-11 DIAGNOSIS — G43.009 MIGRAINE WITHOUT AURA AND WITHOUT STATUS MIGRAINOSUS, NOT INTRACTABLE: ICD-10-CM

## 2022-06-13 RX ORDER — PROPRANOLOL HYDROCHLORIDE 160 MG/1
CAPSULE, EXTENDED RELEASE ORAL
Qty: 90 CAPSULE | Refills: 1 | Status: SHIPPED | OUTPATIENT
Start: 2022-06-13

## 2022-06-14 ENCOUNTER — TELEPHONE (OUTPATIENT)
Dept: NEUROLOGY | Facility: CLINIC | Age: 72
End: 2022-06-14

## 2022-06-14 NOTE — TELEPHONE ENCOUNTER
Patient scheduled next available 10/22/22          YARELY Mittalne Cordelia  Caller: Unspecified (3 days ago,  5:06 PM)  This patient needs a follow up scheduled                       Approved Prescriptions     propranolol (INDERAL LA) 160 mg         Sig: TAKE 1 CAPSULE BY MOUTH DAILY  Disp:  90 capsule    Refills:  1    Start: 6/13/2022    Class: Normal    Authorized by:  YARELY Mittal    Non-formulary For: Benign essential hypertension; Migraine without aura and without status migrainosus, not intractable        To be filled at: [facility]/pharmacy #[id number] Garcia Jimenez

## 2022-06-28 ENCOUNTER — OFFICE VISIT (OUTPATIENT)
Dept: FAMILY MEDICINE CLINIC | Facility: CLINIC | Age: 72
End: 2022-06-28
Payer: COMMERCIAL

## 2022-06-28 VITALS
HEART RATE: 54 BPM | TEMPERATURE: 97.2 F | BODY MASS INDEX: 34.78 KG/M2 | WEIGHT: 189 LBS | DIASTOLIC BLOOD PRESSURE: 68 MMHG | OXYGEN SATURATION: 97 % | HEIGHT: 62 IN | SYSTOLIC BLOOD PRESSURE: 134 MMHG | RESPIRATION RATE: 16 BRPM

## 2022-06-28 DIAGNOSIS — Z13.83 SCREENING FOR CARDIOVASCULAR, RESPIRATORY, AND GENITOURINARY DISEASES: ICD-10-CM

## 2022-06-28 DIAGNOSIS — Z12.5 PROSTATE CANCER SCREENING: ICD-10-CM

## 2022-06-28 DIAGNOSIS — Z13.89 SCREENING FOR CARDIOVASCULAR, RESPIRATORY, AND GENITOURINARY DISEASES: ICD-10-CM

## 2022-06-28 DIAGNOSIS — Z13.6 SCREENING FOR CARDIOVASCULAR, RESPIRATORY, AND GENITOURINARY DISEASES: ICD-10-CM

## 2022-06-28 DIAGNOSIS — Z86.010 PERSONAL HISTORY OF COLONIC POLYPS: Primary | ICD-10-CM

## 2022-06-28 DIAGNOSIS — I10 BENIGN ESSENTIAL HYPERTENSION: ICD-10-CM

## 2022-06-28 DIAGNOSIS — E66.9 OBESITY (BMI 30-39.9): ICD-10-CM

## 2022-06-28 DIAGNOSIS — R73.01 IFG (IMPAIRED FASTING GLUCOSE): ICD-10-CM

## 2022-06-28 PROCEDURE — 1036F TOBACCO NON-USER: CPT | Performed by: FAMILY MEDICINE

## 2022-06-28 PROCEDURE — 99214 OFFICE O/P EST MOD 30 MIN: CPT | Performed by: FAMILY MEDICINE

## 2022-06-28 PROCEDURE — 1160F RVW MEDS BY RX/DR IN RCRD: CPT | Performed by: FAMILY MEDICINE

## 2022-06-28 PROCEDURE — 3008F BODY MASS INDEX DOCD: CPT | Performed by: FAMILY MEDICINE

## 2022-06-28 PROCEDURE — 3075F SYST BP GE 130 - 139MM HG: CPT | Performed by: FAMILY MEDICINE

## 2022-06-28 PROCEDURE — 3078F DIAST BP <80 MM HG: CPT | Performed by: FAMILY MEDICINE

## 2022-06-28 PROCEDURE — 3725F SCREEN DEPRESSION PERFORMED: CPT | Performed by: FAMILY MEDICINE

## 2022-06-28 NOTE — PROGRESS NOTES
Assessment/Plan:    No problem-specific Assessment & Plan notes found for this encounter     htn stable  Wt loss  bmi aware  Headaches stable  Diet controlled DM advised, watch a1c     Diagnoses and all orders for this visit:    Personal history of colonic polyps  -     Ambulatory referral for colonoscopy; Future    Benign essential hypertension  -     Comprehensive metabolic panel; Future    BMI 35 0-35 9,adult    Obesity (BMI 30-39  9)    IFG (impaired fasting glucose)  -     Hemoglobin A1C; Future    Screening for cardiovascular, respiratory, and genitourinary diseases  -     Lipid Panel with Direct LDL reflex; Future    Prostate cancer screening  -     PSA, Total Screen; Future              Return in about 6 months (around 12/28/2022) for Recheck  Subjective:      Patient ID: Kristina Paez is a 71 y o  male  Chief Complaint   Patient presents with    Follow-up     On Emanuel alexander MA        HPI  Gym 2-3x/w  Some neighborhood walking a little  Eating healthy  Lives in / [facility]    Groin discomfort  Hx of hernias  Seen by surgeon, wt loss suggested first  No probs with bowels    Labs reviewed  fbs 157 but a1c 6 9 from 7 2    The following portions of the patient's history were reviewed and updated as appropriate: allergies, current medications, past family history, past medical history, past social history, past surgical history and problem list     Review of Systems   Gastrointestinal: Negative for abdominal distention and constipation           Current Outpatient Medications   Medication Sig Dispense Refill    acetaminophen (TYLENOL) 650 mg CR tablet Take 650 mg by mouth as needed        Ascorbic Acid (VITAMIN C) 500 MG CAPS Take by mouth 2 (two) times a day       aspirin (ECOTRIN LOW STRENGTH) 81 mg EC tablet Take 81 mg by mouth daily       atorvastatin (LIPITOR) 10 mg tablet Take 1 tablet (10 mg total) by mouth daily 90 tablet 1    clotrimazole-betamethasone (LOTRISONE) 1-0 05 % cream Apply topically 2 (two) times a day Short term, right arm (Patient taking differently: Apply topically as needed Short term, right arm) 45 g 0    Cyanocobalamin (VITAMIN B 12 PO) Take by mouth      fluticasone (FLONASE) 50 mcg/act nasal spray 1 spray into each nostril daily      gabapentin (NEURONTIN) 300 mg capsule TAKE 1 CAPSULE BY MOUTH TWICE A  capsule 1    Magnesium Oxide 400 MG CAPS Take 400 mg by mouth 2 (two) times a day       meloxicam (MOBIC) 7 5 mg tablet Take 1 tablet (7 5 mg total) by mouth daily 90 tablet 1    montelukast (SINGULAIR) 10 mg tablet TAKE 1 TABLET (10 MG TOTAL) BY MOUTH DAILY WITH BREAKFAST 90 tablet 1    Multiple Vitamins-Minerals (PRESERVISION AREDS PO) Take by mouth daily      mupirocin (BACTROBAN) 2 % cream Apply topically 3 (three) times a day Prn, hands 30 g 5    omega-3-acid ethyl esters (LOVAZA) 1 g capsule TAKE 2 CAPSULES BY MOUTH 2 TIMES A DAY  360 capsule 1    omeprazole (PriLOSEC OTC) 20 MG tablet Take 20 mg by mouth daily      propranolol (INDERAL LA) 160 mg TAKE 1 CAPSULE BY MOUTH DAILY  90 capsule 1    ramipril (ALTACE) 10 MG capsule TAKE 2 CAPSULES BY MOUTH DAILY 180 capsule 1    SUMAtriptan (IMITREX) 100 mg tablet Take 1 tablet (100 mg total) by mouth once as needed for migraine for up to 1 dose 9 tablet 1    umeclidinium-vilanterol (Anoro Ellipta) 62 5-25 MCG/INH inhaler Inhale 1 puff daily 60 blister 5    verapamil (CALAN-SR) 180 mg CR tablet TAKE 1 TABLET (180 MG TOTAL) BY MOUTH DAILY AT BEDTIME 90 tablet 1     No current facility-administered medications for this visit  Objective:    /68   Pulse (!) 54   Temp (!) 97 2 °F (36 2 °C)   Resp 16   Ht 5' 1 5" (1 562 m)   Wt 85 7 kg (189 lb)   SpO2 97%   BMI 35 13 kg/m²        Physical Exam  Vitals and nursing note reviewed  Constitutional:       General: He is not in acute distress  Appearance: He is well-developed  He is obese  He is not ill-appearing     HENT:      Head: Normocephalic  Right Ear: Tympanic membrane normal       Left Ear: Tympanic membrane normal    Eyes:      General: No scleral icterus  Conjunctiva/sclera: Conjunctivae normal    Cardiovascular:      Rate and Rhythm: Normal rate and regular rhythm  Pulmonary:      Effort: Pulmonary effort is normal  No respiratory distress  Breath sounds: No wheezing or rales  Abdominal:      General: There is no distension  Palpations: Abdomen is soft  Tenderness: There is no abdominal tenderness  Musculoskeletal:         General: No deformity  Cervical back: Neck supple  Skin:     General: Skin is warm and dry  Coloration: Skin is not pale  Neurological:      Mental Status: He is alert  Psychiatric:         Mood and Affect: Mood normal          Behavior: Behavior normal          Thought Content: Thought content normal          BMI Counseling: Body mass index is 35 13 kg/m²  The BMI is above normal  Nutrition recommendations include decreasing portion sizes and moderation in carbohydrate intake  Exercise recommendations include exercising 3-5 times per week  No pharmacotherapy was ordered  Rationale for BMI follow-up plan is due to patient being overweight or obese  Depression Screening and Follow-up Plan: Patient was screened for depression during today's encounter  They screened negative with a PHQ-2 score of 0             Karlo Hummel DO

## 2022-08-01 DIAGNOSIS — Z87.09 HISTORY OF BRONCHITIS: ICD-10-CM

## 2022-08-01 DIAGNOSIS — R06.09 DYSPNEA ON EXERTION: ICD-10-CM

## 2022-08-08 ENCOUNTER — TELEPHONE (OUTPATIENT)
Dept: PULMONOLOGY | Facility: CLINIC | Age: 72
End: 2022-08-08

## 2022-08-08 NOTE — TELEPHONE ENCOUNTER
Larissa Hernandez is calling, she said the pharmacy told her they are waiting for a prior authorization to be completed from our office for his Anoro   Please advise

## 2022-08-09 ENCOUNTER — TELEPHONE (OUTPATIENT)
Dept: PULMONOLOGY | Facility: CLINIC | Age: 72
End: 2022-08-09

## 2022-08-09 NOTE — TELEPHONE ENCOUNTER
[facility]ed [facility] at [phone] and started PA for Anoro   They will fax over the clinical questions for this request,   PA# [id number] ID #[id number]

## 2022-08-10 NOTE — TELEPHONE ENCOUNTER
Pt's sister, Salina Payan, called upset that Nat Berrios still doesn't have his inhaler yet  I explained that we started his prior auth yesterday and it takes a few days  She proceeded to get nasty with me and I tried explaining that we are just doing what we are supposed to do to have his insurance approve this inhaler  I told Salina Payan we could hold aside a sample of the Anoro at the [place] office that she could come  to get Nat Berrios through until his prior Ali Geoff is decided upon  Salina Payan told me that preeti and he--rself cant drive to [place] to  the Anoro sample so "something else needs to be done " Salina Payan stated that she wants another approved medication to be sent to the pharmacy b/c Nat Berrios is out of his inhaler and using his old one    Please advise: [phone]

## 2022-08-10 NOTE — TELEPHONE ENCOUNTER
[facility]ed [facility] and got authorization for anoro [id number] good from 7/11/22 to 8/10/23  Called [facility] and pt's sister to advise

## 2022-08-20 DIAGNOSIS — E78.5 HYPERLIPIDEMIA LDL GOAL <130: ICD-10-CM

## 2022-08-20 RX ORDER — ATORVASTATIN CALCIUM 10 MG/1
TABLET, FILM COATED ORAL
Qty: 90 TABLET | Refills: 1 | Status: SHIPPED | OUTPATIENT
Start: 2022-08-20

## 2022-09-09 DIAGNOSIS — G43.009 MIGRAINE WITHOUT AURA AND WITHOUT STATUS MIGRAINOSUS, NOT INTRACTABLE: ICD-10-CM

## 2022-10-12 PROBLEM — H61.23 BILATERAL IMPACTED CERUMEN: Status: RESOLVED | Noted: 2018-10-12 | Resolved: 2022-10-12

## 2022-10-20 ENCOUNTER — TELEPHONE (OUTPATIENT)
Dept: NEUROLOGY | Facility: CLINIC | Age: 72
End: 2022-10-20

## 2022-10-27 ENCOUNTER — OFFICE VISIT (OUTPATIENT)
Dept: NEUROLOGY | Facility: CLINIC | Age: 72
End: 2022-10-27
Payer: COMMERCIAL

## 2022-10-27 VITALS
BODY MASS INDEX: 35.33 KG/M2 | SYSTOLIC BLOOD PRESSURE: 140 MMHG | HEART RATE: 57 BPM | WEIGHT: 192 LBS | HEIGHT: 62 IN | DIASTOLIC BLOOD PRESSURE: 90 MMHG

## 2022-10-27 DIAGNOSIS — G43.009 MIGRAINE WITHOUT AURA AND WITHOUT STATUS MIGRAINOSUS, NOT INTRACTABLE: ICD-10-CM

## 2022-10-27 DIAGNOSIS — I10 BENIGN ESSENTIAL HYPERTENSION: ICD-10-CM

## 2022-10-27 PROCEDURE — 99214 OFFICE O/P EST MOD 30 MIN: CPT | Performed by: NURSE PRACTITIONER

## 2022-10-27 RX ORDER — SUMATRIPTAN 100 MG/1
100 TABLET, FILM COATED ORAL ONCE AS NEEDED
Qty: 9 TABLET | Refills: 1 | Status: SHIPPED | OUTPATIENT
Start: 2022-10-27

## 2022-10-27 RX ORDER — PROPRANOLOL HYDROCHLORIDE 160 MG/1
160 CAPSULE, EXTENDED RELEASE ORAL DAILY
Qty: 90 CAPSULE | Refills: 3 | Status: SHIPPED | OUTPATIENT
Start: 2022-10-27

## 2022-10-27 NOTE — PATIENT INSTRUCTIONS
Continue with Propranolol 160mg daily  Continue with Verapamil 180mg daily at bedtime  Can use Imitrex at onset of migraine headaches  Follow up with Neurology office in 1yr or sooner if needed

## 2022-10-27 NOTE — PROGRESS NOTES
Patient ID: Rochelle Quach is a 71 y o  male  Assessment/Plan:     Diagnoses and all orders for this visit:    Benign essential hypertension  -     propranolol (INDERAL LA) 160 mg; Take 1 capsule (160 mg total) by mouth daily    Migraine without aura and without status migrainosus, not intractable  -     propranolol (INDERAL LA) 160 mg; Take 1 capsule (160 mg total) by mouth daily  -     verapamil (CALAN-SR) 180 mg CR tablet; Take 1 tablet (180 mg total) by mouth daily at bedtime  -     SUMAtriptan (IMITREX) 100 mg tablet; Take 1 tablet (100 mg total) by mouth once as needed for migraine for up to 1 dose       Continue with Propranolol 160mg daily  Continue with Verapamil 180mg daily at bedtime  Can use Imitrex at onset of migraine headaches  Follow up with Neurology office in 1yr or sooner if needed    Subjective/HPI:  Rochelle Quach is a 78yo male who follows with outpatient neurology office for medical management of migraines  Patient has been on propanolol 160 mg daily as well as verapamil 180 mg at bedtime for preventative medication he takes Imitrex with baby aspirin for breakthrough migraine  Patient reported 1 migraine per month, noted increase in his migraine activity was severe weather changes  He gets some blurred vision at times however felt this was more related to cataracts  He had been seen by Ophthalmology and has been doing well since he had new prescription  Patient's migraines start on the left posterior portion of his head, notes that when the migraine is really bad it will radiate to behind his left eye ball periods happens approximately 1 time per month  Patient has also been having issues with hypertension he has been following with his PCP for this  Continues on the propanolol and the verapamil  Patient reports back to Neurology office today, he is accompanied by his sister  They were last seen in the Neurology office in May of 2021   He was having minimal migraine activity at that time  According to patient's sister he has had no complaints of migraines that she can recall since our last office appointment  He has been doing well on this medication, his blood pressure has been relatively normal with the exception of today's appointment  She states it is unusual that it is high today  Patient's blood pressure 140/90  Patient is tolerating his medications well, he has had no complaints or side effects from med  He does take Imitrex for abortive measures however reports that he has not had any Imitrex in a long time  Would however need to have new prescription on hand in case he should have a breakthrough migraine activity  Patient is doing well, he has remained stable with regards to his migraines  Blood pressure has been well controlled with the exception of this office appointment  No changes will be made to his medication regimen, he will follow-up in the outpatient neurology office in 1 year or sooner if needed          The following portions of the patient's history were reviewed and updated as appropriate: allergies, current medications, past family history, past medical history, past social history, past surgical history and problem list         Past Medical History:   Diagnosis Date   • Arthritis    • Chest pain 02/01/2006   • Chronic pain     lower back   • Closed fracture of one rib 07/27/2007   • Colon polyp    • COPD (chronic obstructive pulmonary disease) ([facility] [address] )    • [facility] )     boarderline diabetes   • Diverticulosis    • Environmental allergies    • GERD (gastroesophageal reflux disease)    • Hiatal hernia    • [place] (hard of hearing)     uses hearing aides   • Hyperlipidemia 01/05/2015   • Hypertension    • Migraine    • Sebaceous cyst 01/11/2016       Past Surgical History:   Procedure Laterality Date   • COLONOSCOPY     • EGD AND COLONOSCOPY     • WISDOM TOOTH EXTRACTION         Social History     Socioeconomic History   • Marital status: Single Spouse name: None   • Number of children: None   • Years of education: None   • Highest education level: None   Occupational History   • None   Tobacco Use   • Smoking status: Passive Smoke Exposure - Never Smoker   • Smokeless tobacco: Never Used   • Tobacco comment: from infancy    Vaping Use   • Vaping Use: Never used   Substance and Sexual Activity   • Alcohol use:  Yes     Alcohol/week: 4 0 standard drinks     Types: 4 Glasses of wine per week     Comment: social   • Drug use: No   • Sexual activity: None   Other Topics Concern   • None   Social History Narrative   • None     Social Determinants of Health     Financial Resource Strain: Not on file   Food Insecurity: Not on file   Transportation Needs: Not on file   Physical Activity: Not on file   Stress: Not on file   Social Connections: Not on file   Intimate Partner Violence: Not on file   Housing Stability: Not on file       Family History   Problem Relation Age of Onset   • Arthritis Mother    • COPD Mother    • Hypertension Mother    • Lung cancer Father    • Asthma Brother    • Cancer Brother         colon   • Diabetes Sister    • Hypertension Sister    • Stomach cancer Brother          Current Outpatient Medications:   •  acetaminophen (TYLENOL) 650 mg CR tablet, Take 650 mg by mouth as needed  , Disp: , Rfl:   •  Anoro Ellipta 62 5-25 MCG/INH inhaler, TAKE 1 PUFF BY MOUTH EVERY DAY, Disp: 90 blister, Rfl: 1  •  Ascorbic Acid (VITAMIN C) 500 MG CAPS, Take by mouth 2 (two) times a day , Disp: , Rfl:   •  aspirin (ECOTRIN LOW STRENGTH) 81 mg EC tablet, Take 81 mg by mouth if needed, Disp: , Rfl:   •  atorvastatin (LIPITOR) 10 mg tablet, TAKE 1 TABLET BY MOUTH EVERY DAY, Disp: 90 tablet, Rfl: 1  •  clotrimazole-betamethasone (LOTRISONE) 1-0 05 % cream, Apply topically 2 (two) times a day Short term, right arm (Patient taking differently: Apply topically as needed Short term, right arm), Disp: 45 g, Rfl: 0  •  Cyanocobalamin (VITAMIN B 12 PO), Take by mouth, Disp: , Rfl:   •  fluticasone (FLONASE) 50 mcg/act nasal spray, 1 spray into each nostril daily, Disp: , Rfl:   •  gabapentin (NEURONTIN) 300 mg capsule, TAKE 1 CAPSULE BY MOUTH TWICE A DAY, Disp: 180 capsule, Rfl: 1  •  Magnesium Oxide 400 MG CAPS, Take 400 mg by mouth 2 (two) times a day , Disp: , Rfl:   •  montelukast (SINGULAIR) 10 mg tablet, TAKE 1 TABLET (10 MG TOTAL) BY MOUTH DAILY WITH BREAKFAST, Disp: 90 tablet, Rfl: 1  •  Multiple Vitamins-Minerals (PRESERVISION AREDS PO), Take by mouth daily, Disp: , Rfl:   •  mupirocin (BACTROBAN) 2 % cream, Apply topically 3 (three) times a day Prn, hands, Disp: 30 g, Rfl: 5  •  omega-3-acid ethyl esters (LOVAZA) 1 g capsule, TAKE 2 CAPSULES BY MOUTH 2 TIMES A DAY , Disp: 360 capsule, Rfl: 1  •  omeprazole (PriLOSEC OTC) 20 MG tablet, Take 20 mg by mouth daily, Disp: , Rfl:   •  propranolol (INDERAL LA) 160 mg, Take 1 capsule (160 mg total) by mouth daily, Disp: 90 capsule, Rfl: 3  •  ramipril (ALTACE) 10 MG capsule, TAKE 2 CAPSULES BY MOUTH DAILY, Disp: 180 capsule, Rfl: 1  •  SUMAtriptan (IMITREX) 100 mg tablet, Take 1 tablet (100 mg total) by mouth once as needed for migraine for up to 1 dose, Disp: 9 tablet, Rfl: 1  •  verapamil (CALAN-SR) 180 mg CR tablet, Take 1 tablet (180 mg total) by mouth daily at bedtime, Disp: 90 tablet, Rfl: 3  •  meloxicam (MOBIC) 7 5 mg tablet, Take 1 tablet (7 5 mg total) by mouth daily (Patient not taking: Reported on 10/27/2022), Disp: 90 tablet, Rfl: 1    Allergies   Allergen Reactions   • Other Sneezing     SEASONAL   Other reaction(s): Sneezing  SEASONAL         Blood pressure 140/90, pulse 57, height 5' 1 5" (1 562 m), weight 87 1 kg (192 lb)  Objective:    Blood pressure 140/90, pulse 57, height 5' 1 5" (1 562 m), weight 87 1 kg (192 lb)  Physical Exam  Vitals reviewed  Constitutional:       Appearance: Normal appearance  He is well-developed  HENT:      Head: Normocephalic        Right Ear: Hearing normal       Left Ear: Hearing normal       Nose: Nose normal       Mouth/Throat:      Mouth: Mucous membranes are moist    Eyes:      General: Lids are normal       Extraocular Movements: Extraocular movements intact  Conjunctiva/sclera: Conjunctivae normal       Pupils: Pupils are equal, round, and reactive to light  Cardiovascular:      Rate and Rhythm: Normal rate  Pulmonary:      Effort: Pulmonary effort is normal  No respiratory distress  Abdominal:      Palpations: Abdomen is soft  Tenderness: There is no abdominal tenderness  Musculoskeletal:         General: Normal range of motion  Cervical back: Normal range of motion  Skin:     General: Skin is warm and dry  Neurological:      Mental Status: He is alert  Coordination: Romberg sign negative  Deep Tendon Reflexes: Strength normal and reflexes are normal and symmetric  Psychiatric:         Attention and Perception: Attention and perception normal          Mood and Affect: Mood and affect normal          Speech: Speech normal          Behavior: Behavior normal  Behavior is cooperative  Thought Content: Thought content normal          Cognition and Memory: Cognition and memory normal          Judgment: Judgment normal          Neurological Exam  Mental Status  Alert  Oriented to person, place, time and situation  Memory is normal  Recent and remote memory are intact  Speech is normal  Language is fluent with no aphasia  Attention and concentration are normal  Fund of knowledge is appropriate for level of education  Cranial Nerves  CN II: Visual acuity is normal  Visual fields full to confrontation  CN III, IV, VI: Extraocular movements intact bilaterally  Normal lids and orbits bilaterally  Pupils equal round and reactive to light bilaterally  CN V: Facial sensation is normal   CN VII: Full and symmetric facial movement    CN VIII: Hearing is normal   Right: Hearing is normal   Left: Hearing is normal   CN IX, X: Palate elevates symmetrically  Normal gag reflex  CN XI: Shoulder shrug strength is normal   CN XII: Tongue midline without atrophy or fasciculations  Motor  Normal muscle bulk throughout  Normal muscle tone  No abnormal involuntary movements  Strength is 5/5 throughout all four extremities  Sensory  Light touch is normal in upper and lower extremities  Temperature is normal in upper and lower extremities  Vibration is normal in upper and lower extremities  Proprioception is normal in upper and lower extremities  Reflexes  Deep tendon reflexes are 2+ and symmetric in all four extremities  Right pathological reflexes: Herbert's absent  Left pathological reflexes: Herbert's absent  Coordination  Right: Finger-to-nose normal  Rapid alternating movement normal  Heel-to-shin normal Left: Finger-to-nose normal  Rapid alternating movement normal  Heel-to-shin normal     Gait  Casual gait is normal including stance, stride, and arm swing  Normal toe walking  Normal heel walking  Normal tandem gait  Romberg is absent  Able to rise from chair without using arms  ROS:    Review of Systems   Constitutional: Negative  Negative for appetite change and fever  HENT: Negative  Negative for hearing loss, tinnitus, trouble swallowing and voice change  Eyes: Negative  Negative for photophobia, pain and visual disturbance  Respiratory: Negative  Negative for shortness of breath  Cardiovascular: Negative  Negative for palpitations  Gastrointestinal: Negative  Negative for nausea and vomiting  Endocrine: Negative  Negative for cold intolerance  Genitourinary: Negative  Negative for dysuria, frequency and urgency  Musculoskeletal: Negative  Negative for gait problem, myalgias and neck pain  Skin: Negative  Negative for rash  Allergic/Immunologic: Negative  Neurological: Negative    Negative for dizziness, tremors, seizures, syncope, facial asymmetry, speech difficulty, weakness, light-headedness, numbness and headaches  Hematological: Negative  Does not bruise/bleed easily  Psychiatric/Behavioral: Negative  Negative for confusion, hallucinations and sleep disturbance  ROS reviewed and discussed with patient and his sister

## 2022-10-31 ENCOUNTER — TELEPHONE (OUTPATIENT)
Dept: PULMONOLOGY | Facility: MEDICAL CENTER | Age: 72
End: 2022-10-31

## 2022-10-31 NOTE — TELEPHONE ENCOUNTER
LM for patient to call office back to schedule 1yr f/u , first available  Appointment reminder mailed

## 2022-12-04 DIAGNOSIS — E78.1 HIGH TRIGLYCERIDES: ICD-10-CM

## 2022-12-04 DIAGNOSIS — I10 BENIGN ESSENTIAL HYPERTENSION: ICD-10-CM

## 2022-12-05 RX ORDER — RAMIPRIL 10 MG/1
CAPSULE ORAL
Qty: 180 CAPSULE | Refills: 1 | Status: SHIPPED | OUTPATIENT
Start: 2022-12-05

## 2022-12-05 RX ORDER — OMEGA-3-ACID ETHYL ESTERS 1 G/1
CAPSULE, LIQUID FILLED ORAL
Qty: 360 CAPSULE | Refills: 1 | Status: SHIPPED | OUTPATIENT
Start: 2022-12-05

## 2022-12-08 LAB — HBA1C MFR BLD HPLC: 9.3 %

## 2022-12-13 ENCOUNTER — OFFICE VISIT (OUTPATIENT)
Dept: FAMILY MEDICINE CLINIC | Facility: CLINIC | Age: 72
End: 2022-12-13

## 2022-12-13 VITALS
BODY MASS INDEX: 35.15 KG/M2 | OXYGEN SATURATION: 95 % | DIASTOLIC BLOOD PRESSURE: 70 MMHG | HEART RATE: 68 BPM | WEIGHT: 191 LBS | HEIGHT: 62 IN | TEMPERATURE: 97.9 F | SYSTOLIC BLOOD PRESSURE: 144 MMHG | RESPIRATION RATE: 16 BRPM

## 2022-12-13 DIAGNOSIS — M54.50 CHRONIC BILATERAL LOW BACK PAIN WITHOUT SCIATICA: ICD-10-CM

## 2022-12-13 DIAGNOSIS — I10 BENIGN ESSENTIAL HYPERTENSION: ICD-10-CM

## 2022-12-13 DIAGNOSIS — Z00.00 MEDICARE ANNUAL WELLNESS VISIT, SUBSEQUENT: Primary | ICD-10-CM

## 2022-12-13 DIAGNOSIS — Z23 IMMUNIZATION DUE: ICD-10-CM

## 2022-12-13 DIAGNOSIS — G89.29 CHRONIC BILATERAL LOW BACK PAIN WITHOUT SCIATICA: ICD-10-CM

## 2022-12-13 DIAGNOSIS — R73.01 IFG (IMPAIRED FASTING GLUCOSE): ICD-10-CM

## 2022-12-13 DIAGNOSIS — E78.5 HYPERLIPIDEMIA LDL GOAL <130: ICD-10-CM

## 2022-12-13 RX ORDER — GABAPENTIN 300 MG/1
300 CAPSULE ORAL 2 TIMES DAILY
Qty: 180 CAPSULE | Refills: 1 | Status: SHIPPED | OUTPATIENT
Start: 2022-12-13

## 2022-12-13 NOTE — PROGRESS NOTES
Assessment/Plan:    No problem-specific Assessment & Plan notes found for this encounter  If bp remains high with HA despite TLC then they can call for a med change/addition with f/u afterward    HLD stable on statin    Cont gabapentin for LBP    rx adacel for pharmacy    ifg aware, work on diet choices/snacks     Diagnoses and all orders for this visit:    Medicare annual wellness visit, subsequent    Benign essential hypertension  -     Comprehensive metabolic panel; Future    Hyperlipidemia LDL goal <130    Chronic bilateral low back pain without sciatica  -     gabapentin (NEURONTIN) 300 mg capsule; Take 1 capsule (300 mg total) by mouth 2 (two) times a day    Immunization due  -     tetanus-diphtheria-acellular pertussis (ADACEL) 5-2-15 5 LF-mcg/0 5 injection; Inject 0 5 mL into a muscle once for 1 dose    IFG (impaired fasting glucose)  -     Hemoglobin A1C; Future        Return in about 6 months (around 6/13/2023) for Recheck  Subjective:      Patient ID: Kathi Hamm is a 71 y o  male  Chief Complaint   Patient presents with   • Follow-up     6 month f/u-wmcma       HPI  Goes to [facility] where he lives 3x/w  Machines  Rides the bike  Has his sweets  Portion control, some carbs  Some wt gain  Ongoing headaches, infrequent though    The following portions of the patient's history were reviewed and updated as appropriate: allergies, current medications, past family history, past medical history, past social history, past surgical history and problem list     Review of Systems   Constitutional: Negative for chills and fever           Current Outpatient Medications   Medication Sig Dispense Refill   • acetaminophen (TYLENOL) 650 mg CR tablet Take 650 mg by mouth as needed       • Anoro Ellipta 62 5-25 MCG/INH inhaler TAKE 1 PUFF BY MOUTH EVERY DAY 90 blister 1   • Ascorbic Acid (VITAMIN C) 500 MG CAPS Take by mouth 2 (two) times a day      • aspirin (ECOTRIN LOW STRENGTH) 81 mg EC tablet Take 81 mg by mouth if needed     • atorvastatin (LIPITOR) 10 mg tablet TAKE 1 TABLET BY MOUTH EVERY DAY 90 tablet 1   • clotrimazole-betamethasone (LOTRISONE) 1-0 05 % cream Apply topically 2 (two) times a day Short term, right arm (Patient taking differently: Apply topically as needed Short term, right arm) 45 g 0   • Cyanocobalamin (VITAMIN B 12 PO) Take by mouth     • fluticasone (FLONASE) 50 mcg/act nasal spray 1 spray into each nostril daily     • gabapentin (NEURONTIN) 300 mg capsule Take 1 capsule (300 mg total) by mouth 2 (two) times a day 180 capsule 1   • Magnesium Oxide 400 MG CAPS Take 400 mg by mouth 2 (two) times a day      • montelukast (SINGULAIR) 10 mg tablet TAKE 1 TABLET (10 MG TOTAL) BY MOUTH DAILY WITH BREAKFAST 90 tablet 1   • Multiple Vitamins-Minerals (PRESERVISION AREDS PO) Take by mouth daily     • mupirocin (BACTROBAN) 2 % cream Apply topically 3 (three) times a day Prn, hands 30 g 5   • omega-3-acid ethyl esters (LOVAZA) 1 g capsule TAKE 2 CAPSULES BY MOUTH TWICE A  capsule 1   • omeprazole (PriLOSEC OTC) 20 MG tablet Take 20 mg by mouth daily     • propranolol (INDERAL LA) 160 mg Take 1 capsule (160 mg total) by mouth daily 90 capsule 3   • ramipril (ALTACE) 10 MG capsule TAKE 2 CAPSULES BY MOUTH EVERY  capsule 1   • SUMAtriptan (IMITREX) 100 mg tablet Take 1 tablet (100 mg total) by mouth once as needed for migraine for up to 1 dose 9 tablet 1   • verapamil (CALAN-SR) 180 mg CR tablet Take 1 tablet (180 mg total) by mouth daily at bedtime 90 tablet 3   • meloxicam (MOBIC) 7 5 mg tablet Take 1 tablet (7 5 mg total) by mouth daily (Patient not taking: Reported on 10/27/2022) 90 tablet 1     No current facility-administered medications for this visit  Objective:    /70   Pulse 68   Temp 97 9 °F (36 6 °C) (Temporal)   Resp 16   Ht 5' 1 5" (1 562 m)   Wt 86 6 kg (191 lb)   SpO2 95%   BMI 35 50 kg/m²        Physical Exam  Vitals and nursing note reviewed     Constitutional: General: He is not in acute distress  Appearance: He is well-developed  He is obese  He is not ill-appearing  HENT:      Head: Normocephalic  Right Ear: External ear normal       Left Ear: External ear normal    Eyes:      General: No scleral icterus  Conjunctiva/sclera: Conjunctivae normal    Cardiovascular:      Rate and Rhythm: Normal rate and regular rhythm  Pulmonary:      Effort: Pulmonary effort is normal  No respiratory distress  Breath sounds: No wheezing  Abdominal:      Palpations: Abdomen is soft  Tenderness: There is no abdominal tenderness  Musculoskeletal:         General: No deformity  Cervical back: Neck supple  Right lower leg: No edema  Left lower leg: No edema  Skin:     General: Skin is warm and dry  Coloration: Skin is not pale  Neurological:      Mental Status: He is alert  Psychiatric:         Mood and Affect: Mood normal          Behavior: Behavior normal          Thought Content:  Thought content normal                 Dora Michaels DO

## 2022-12-14 PROBLEM — R73.01 IFG (IMPAIRED FASTING GLUCOSE): Status: ACTIVE | Noted: 2022-12-14

## 2022-12-14 PROBLEM — Z23 IMMUNIZATION DUE: Status: ACTIVE | Noted: 2022-12-14

## 2022-12-14 NOTE — PROGRESS NOTES
Assessment and Plan:     Problem List Items Addressed This Visit        Active Problems    Hyperlipidemia LDL goal <130    Medicare annual wellness visit, subsequent - Primary    Benign essential hypertension    Relevant Orders    Comprehensive metabolic panel    Chronic bilateral low back pain without sciatica    Relevant Medications    gabapentin (NEURONTIN) 300 mg capsule    IFG (impaired fasting glucose)    Relevant Orders    Hemoglobin A1C    Immunization due        Preventive health issues were discussed with patient, and age appropriate screening tests were ordered as noted in patient's After Visit Summary  Personalized health advice and appropriate referrals for health education or preventive services given if needed, as noted in patient's After Visit Summary  History of Present Illness:     Patient presents for a Medicare Wellness Visit    HPI   Patient Care Team:  Telma Davidson DO as PCP - [address],  as PCP - [address] ([place])  MD Kaye Mendez MD Penelope Havens, MD Belynda Hobby, MD Cleotilde Hole, DO     Review of Systems:     Review of Systems     Problem List:     Patient Active Problem List   Diagnosis   • Allergic rhinitis   • Benign essential hypertension   • Benign prostatic hyperplasia   • Cervical radiculopathy   • Diverticulosis of large intestine without perforation or abscess without bleeding   • GE reflux   • Hiatal hernia   • High triglycerides   • Hyperlipidemia LDL goal <130   • Inguinal hernia   • Internal hemorrhoids   • Migraine without aura and without status migrainosus, not intractable   • Obesity (BMI 30-39  9)   • Epidermoid cyst of skin   • Situational anxiety   • Spine disorder   • BMI 35 0-35 9,adult   • SOB (shortness of breath)   • Prostate cancer screening   • Hand dermatitis   • Medicare annual wellness visit, subsequent   • Dyspnea on exertion   • Abnormal glucose   • Left inguinal hernia   • Class 2 severe obesity due to excess calories with serious comorbidity and body mass index (BMI) of 35 0 to 35 9 in adult [facility])   • Fungal infection of skin of abdomen   • Umbilical hernia with obstruction, without gangrene   • Kidney disorder   • Abnormal CT scan, lung   • Tinea cruris   • Gastroesophageal reflux disease   • Personal history of colonic polyps   • Sensorineural hearing loss (SNHL), bilateral   • COPD (chronic obstructive pulmonary disease) ([facility])   • Chronic bilateral low back pain without sciatica   • IFG (impaired fasting glucose)   • Immunization due      Past Medical and Surgical History:     Past Medical History:   Diagnosis Date   • Arthritis    • Chest pain 02/01/2006   • Chronic pain     lower back   • Closed fracture of one rib 07/27/2007   • Colon polyp    • COPD (chronic obstructive pulmonary disease) ([facility])    • Diabetes mellitus ([facility])     boarderline diabetes   • Diverticulosis    • Environmental allergies    • GERD (gastroesophageal reflux disease)    • Hiatal hernia    • Kaw (hard of hearing)     uses hearing aides   • Hyperlipidemia 01/05/2015   • Hypertension    • Migraine    • Sebaceous cyst 01/11/2016     Past Surgical History:   Procedure Laterality Date   • COLONOSCOPY     • EGD AND COLONOSCOPY     • WISDOM TOOTH EXTRACTION        Family History:     Family History   Problem Relation Age of Onset   • Arthritis Mother    • COPD Mother    • Hypertension Mother    • Lung cancer Father    • Asthma Brother    • Cancer Brother         colon   • Diabetes Sister    • Hypertension Sister    • Stomach cancer Brother       Social History:     Social History     Socioeconomic History   • Marital status: Single     Spouse name: None   • Number of children: None   • Years of education: None   • Highest education level: None   Occupational History   • None   Tobacco Use   • Smoking status: Never     Passive exposure:  Yes   • Smokeless tobacco: Never   • Tobacco comments:     from infancy    Vaping Use   • Vaping Use: Never used   Substance and Sexual Activity   • Alcohol use: Yes     Alcohol/week: 4 0 standard drinks     Types: 4 Glasses of wine per week     Comment: social   • Drug use: No   • Sexual activity: None   Other Topics Concern   • None   Social History Narrative   • None     Social Determinants of Health     Financial Resource Strain: Low Risk    • Difficulty of Paying Living Expenses: Not hard at all   Food Insecurity: Not on file   Transportation Needs: No Transportation Needs   • Lack of Transportation (Medical): No   • Lack of Transportation (Non-Medical):  No   Physical Activity: Not on file   Stress: Not on file   Social Connections: Not on file   Intimate Partner Violence: Not on file   Housing Stability: Not on file      Medications and Allergies:     Current Outpatient Medications   Medication Sig Dispense Refill   • acetaminophen (TYLENOL) 650 mg CR tablet Take 650 mg by mouth as needed       • Anoro Ellipta 62 5-25 MCG/INH inhaler TAKE 1 PUFF BY MOUTH EVERY DAY 90 blister 1   • Ascorbic Acid (VITAMIN C) 500 MG CAPS Take by mouth 2 (two) times a day      • aspirin (ECOTRIN LOW STRENGTH) 81 mg EC tablet Take 81 mg by mouth if needed     • atorvastatin (LIPITOR) 10 mg tablet TAKE 1 TABLET BY MOUTH EVERY DAY 90 tablet 1   • clotrimazole-betamethasone (LOTRISONE) 1-0 05 % cream Apply topically 2 (two) times a day Short term, right arm (Patient taking differently: Apply topically as needed Short term, right arm) 45 g 0   • Cyanocobalamin (VITAMIN B 12 PO) Take by mouth     • fluticasone (FLONASE) 50 mcg/act nasal spray 1 spray into each nostril daily     • gabapentin (NEURONTIN) 300 mg capsule Take 1 capsule (300 mg total) by mouth 2 (two) times a day 180 capsule 1   • Magnesium Oxide 400 MG CAPS Take 400 mg by mouth 2 (two) times a day      • montelukast (SINGULAIR) 10 mg tablet TAKE 1 TABLET (10 MG TOTAL) BY MOUTH DAILY WITH BREAKFAST 90 tablet 1   • Multiple Vitamins-Minerals (PRESERVISION AREDS PO) Take by mouth daily     • mupirocin (BACTROBAN) 2 % cream Apply topically 3 (three) times a day Prn, hands 30 g 5   • omega-3-acid ethyl esters (LOVAZA) 1 g capsule TAKE 2 CAPSULES BY MOUTH TWICE A  capsule 1   • omeprazole (PriLOSEC OTC) 20 MG tablet Take 20 mg by mouth daily     • propranolol (INDERAL LA) 160 mg Take 1 capsule (160 mg total) by mouth daily 90 capsule 3   • ramipril (ALTACE) 10 MG capsule TAKE 2 CAPSULES BY MOUTH EVERY  capsule 1   • SUMAtriptan (IMITREX) 100 mg tablet Take 1 tablet (100 mg total) by mouth once as needed for migraine for up to 1 dose 9 tablet 1   • verapamil (CALAN-SR) 180 mg CR tablet Take 1 tablet (180 mg total) by mouth daily at bedtime 90 tablet 3   • meloxicam (MOBIC) 7 5 mg tablet Take 1 tablet (7 5 mg total) by mouth daily (Patient not taking: Reported on 10/27/2022) 90 tablet 1     No current facility-administered medications for this visit  Allergies   Allergen Reactions   • Other Sneezing     SEASONAL   Other reaction(s): Sneezing  SEASONAL       Immunizations:     Immunization History   Administered Date(s) Administered   • COVID-19 MODERNA VACC 0 25 ML IM BOOSTER 11/23/2021   • COVID-19 MODERNA VACC 0 5 ML IM 03/17/2021, 04/16/2021   • Tuberculin Skin Test-PPD Intradermal 11/05/2019      Health Maintenance:         Topic Date Due   • Hepatitis C Screening  Never done   • Colorectal Cancer Screening  03/21/2022         Topic Date Due   • Hepatitis B Vaccine (1 of 3 - 3-dose series) Never done   • Pneumococcal Vaccine: 65+ Years (1 - PCV) Never done   • COVID-19 Vaccine (4 - Booster for Moderna series) 03/23/2022   • Influenza Vaccine (1) Never done      Medicare Screening Tests and Risk Assessments:     Georgia Corado is here for his Subsequent Wellness visit  Last Medicare Wellness visit information reviewed, patient interviewed and updates made to the record today  Health Risk Assessment:   Patient rates overall health as good   Patient feels that their physical health rating is same  Patient is very satisfied with their life  Eyesight was rated as same  Hearing was rated as slightly worse  Patient feels that their emotional and mental health rating is same  Patients states they are never, rarely angry  Patient states they are sometimes unusually tired/fatigued  Pain experienced in the last 7 days has been a lot  Patient's pain rating has been 5/10  Patient states that he has experienced no weight loss or gain in last 6 months  Depression Screening:   PHQ-2 Score: 0      Fall Risk Screening: In the past year, patient has experienced: history of falling in past year    Number of falls: 1  Injured during fall?: No    Feels unsteady when standing or walking?: No    Worried about falling?: No      Home Safety:  Patient has trouble with stairs inside or outside of their home  Patient has working smoke alarms and has working carbon monoxide detector  Home safety hazards include: none  Nutrition:   Current diet is Low Saturated Fat and Frequent junk food  Medications:   Patient is currently taking over-the-counter supplements  OTC medications include: see medication list  Patient is not able to manage medications  Activities of Daily Living (ADLs)/Instrumental Activities of Daily Living (IADLs):   Walk and transfer into and out of bed and chair?: Yes  Dress and groom yourself?: Yes    Bathe or shower yourself?: Yes    Feed yourself? Yes  Do your laundry/housekeeping?: Yes  Manage your money, pay your bills and track your expenses?: No  Make your own meals?: Yes    Do your own shopping?: Yes    Previous Hospitalizations:   Any hospitalizations or ED visits within the last 12 months?: No      Advance Care Planning:   Living will: Yes    Durable POA for healthcare:  Yes    Advanced directive: Yes    End of Life Decisions reviewed with patient: No      Cognitive Screening:   Provider or family/friend/caregiver concerned regarding cognition?: No    PREVENTIVE SCREENINGS      Cardiovascular Screening:    General: Screening Not Indicated and History Lipid Disorder      Diabetes Screening:     General: Screening Current      Colorectal Cancer Screening:     General: Screening Current      Prostate Cancer Screening:    General: Risks and Benefits Discussed      Abdominal Aortic Aneurysm (AAA) Screening:    Risk factors include: age between 73-69 yo        General: Screening Not Indicated      Lung Cancer Screening:     General: Screening Not Indicated      Hepatitis C Screening:    General: Patient Declines    Hep C Screening Accepted: No     Screening, Brief Intervention, and Referral to Treatment (SBIRT)    Screening  Typical number of drinks in a day: 0  Typical number of drinks in a week: 1  Interpretation: Low risk drinking behavior  Single Item Drug Screening:  How often have you used an illegal drug (including marijuana) or a prescription medication for non-medical reasons in the past year? never    Single Item Drug Screen Score: 0  Interpretation: Negative screen for possible drug use disorder    Other Counseling Topics:   Regular weightbearing exercise  No results found       Physical Exam:     /70   Pulse 68   Temp 97 9 °F (36 6 °C) (Temporal)   Resp 16   Ht 5' 1 5" (1 562 m)   Wt 86 6 kg (191 lb)   SpO2 95%   BMI 35 50 kg/m²     Physical Exam     Morris Fillers, DO

## 2022-12-14 NOTE — PATIENT INSTRUCTIONS
Medicare Preventive Visit Patient Instructions  Thank you for completing your Welcome to Medicare Visit or Medicare Annual Wellness Visit today  Your next wellness visit will be due in one year (12/15/2023)  The screening/preventive services that you may require over the next 5-10 years are detailed below  Some tests may not apply to you based off risk factors and/or age  Screening tests ordered at today's visit but not completed yet may show as past due  Also, please note that scanned in results may not display below  Preventive Screenings:  Service Recommendations Previous Testing/Comments   Colorectal Cancer Screening  · Colonoscopy    · Fecal Occult Blood Test (FOBT)/Fecal Immunochemical Test (FIT)  · Fecal DNA/Cologuard Test  · Flexible Sigmoidoscopy Age: 39-70 years old   Colonoscopy: every 10 years (May be performed more frequently if at higher risk)  OR  FOBT/FIT: every 1 year  OR  Cologuard: every 3 years  OR  Sigmoidoscopy: every 5 years  Screening may be recommended earlier than age 39 if at higher risk for colorectal cancer  Also, an individualized decision between you and your healthcare provider will decide whether screening between the ages of 74-80 would be appropriate  Colonoscopy: 09/22/2021  FOBT/FIT: Not on file  Cologuard: Not on file  Sigmoidoscopy: Not on file          Prostate Cancer Screening Individualized decision between patient and health care provider in men between ages of 53-78   Medicare will cover every 12 months beginning on the day after your 50th birthday PSA: 3 4 ng/mL           Hepatitis C Screening Once for adults born between 1945 and 1965  More frequently in patients at high risk for Hepatitis C Hep C Antibody: Not on file        Diabetes Screening 1-2 times per year if you're at risk for diabetes or have pre-diabetes Fasting glucose: 157 mg/dL (6/7/2022)  A1C: 6 9 % (6/7/2022)      Cholesterol Screening Once every 5 years if you don't have a lipid disorder   May order more often based on risk factors  Lipid panel: 12/07/2021         Other Preventive Screenings Covered by Medicare:  1  Abdominal Aortic Aneurysm (AAA) Screening: covered once if your at risk  You're considered to be at risk if you have a family history of AAA or a male between the age of 73-68 who smoking at least 100 cigarettes in your lifetime  2  Lung Cancer Screening: covers low dose CT scan once per year if you meet all of the following conditions: (1) Age 50-69; (2) No signs or symptoms of lung cancer; (3) Current smoker or have quit smoking within the last 15 years; (4) You have a tobacco smoking history of at least 20 pack years (packs per day x number of years you smoked); (5) You get a written order from a healthcare provider  3  Glaucoma Screening: covered annually if you're considered high risk: (1) You have diabetes OR (2) Family history of glaucoma OR (3)  aged 48 and older OR (3)  American aged 72 and older  3  Osteoporosis Screening: covered every 2 years if you meet one of the following conditions: (1) Have a vertebral abnormality; (2) On glucocorticoid therapy for more than 3 months; (3) Have primary hyperparathyroidism; (4) On osteoporosis medications and need to assess response to drug therapy  5  HIV Screening: covered annually if you're between the age of 12-76  Also covered annually if you are younger than 13 and older than 72 with risk factors for HIV infection  For pregnant patients, it is covered up to 3 times per pregnancy      Immunizations:  Immunization Recommendations   Influenza Vaccine Annual influenza vaccination during flu season is recommended for all persons aged >= 6 months who do not have contraindications   Pneumococcal Vaccine   * Pneumococcal conjugate vaccine = PCV13 (Prevnar 13), PCV15 (Vaxneuvance), PCV20 (Prevnar 20)  * Pneumococcal polysaccharide vaccine = PPSV23 (Pneumovax) Adults 25-60 years old: 1-3 doses may be recommended based on certain risk factors  Adults 72 years old: 1-2 doses may be recommended based off what pneumonia vaccine you previously received   Hepatitis B Vaccine 3 dose series if at intermediate or high risk (ex: diabetes, end stage renal disease, liver disease)   Tetanus (Td) Vaccine - COST NOT COVERED BY MEDICARE PART B Following completion of primary series, a booster dose should be given every 10 years to maintain immunity against tetanus  Td may also be given as tetanus wound prophylaxis  Tdap Vaccine - COST NOT COVERED BY MEDICARE PART B Recommended at least once for all adults  For pregnant patients, recommended with each pregnancy  Shingles Vaccine (Shingrix) - COST NOT COVERED BY MEDICARE PART B  2 shot series recommended in those aged 48 and above     Health Maintenance Due:      Topic Date Due   • Hepatitis C Screening  Never done   • Colorectal Cancer Screening  03/21/2022     Immunizations Due:      Topic Date Due   • Hepatitis B Vaccine (1 of 3 - 3-dose series) Never done   • Pneumococcal Vaccine: 65+ Years (1 - PCV) Never done   • COVID-19 Vaccine (4 - Booster for Moderna series) 03/23/2022   • Influenza Vaccine (1) Never done     Advance Directives   What are advance directives? Advance directives are legal documents that state your wishes and plans for medical care  These plans are made ahead of time in case you lose your ability to make decisions for yourself  Advance directives can apply to any medical decision, such as the treatments you want, and if you want to donate organs  What are the types of advance directives? There are many types of advance directives, and each state has rules about how to use them  You may choose a combination of any of the following:  · Living will: This is a written record of the treatment you want  You can also choose which treatments you do not want, which to limit, and which to stop at a certain time  This includes surgery, medicine, IV fluid, and tube feedings     · Durable power of  for healthcare [facility]): This is a written record that states who you want to make healthcare choices for you when you are unable to make them for yourself  This person, called a proxy, is usually a family member or a friend  You may choose more than 1 proxy  · Do not resuscitate (DNR) order:  A DNR order is used in case your heart stops beating or you stop breathing  It is a request not to have certain forms of treatment, such as CPR  A DNR order may be included in other types of advance directives  · Medical directive: This covers the care that you want if you are in a coma, near death, or unable to make decisions for yourself  You can list the treatments you want for each condition  Treatment may include pain medicine, surgery, blood transfusions, dialysis, IV or tube feedings, and a ventilator (breathing machine)  · Values history: This document has questions about your views, beliefs, and how you feel and think about life  This information can help others choose the care that you would choose  Why are advance directives important? An advance directive helps you control your care  Although spoken wishes may be used, it is better to have your wishes written down  Spoken wishes can be misunderstood, or not followed  Treatments may be given even if you do not want them  An advance directive may make it easier for your family to make difficult choices about your care  Weight Management   Why it is important to manage your weight:  Being overweight increases your risk of health conditions such as heart disease, high blood pressure, type 2 diabetes, and certain types of cancer  It can also increase your risk for osteoarthritis, sleep apnea, and other respiratory problems  Aim for a slow, steady weight loss  Even a small amount of weight loss can lower your risk of health problems    How to lose weight safely:  A safe and healthy way to lose weight is to eat fewer calories and get regular exercise  You can lose up about 1 pound a week by decreasing the number of calories you eat by 500 calories each day  Healthy meal plan for weight management:  A healthy meal plan includes a variety of foods, contains fewer calories, and helps you stay healthy  A healthy meal plan includes the following:  · Eat whole-grain foods more often  A healthy meal plan should contain fiber  Fiber is the part of grains, fruits, and vegetables that is not broken down by your body  Whole-grain foods are healthy and provide extra fiber in your diet  Some examples of whole-grain foods are whole-wheat breads and pastas, oatmeal, brown rice, and bulgur  · Eat a variety of vegetables every day  Include dark, leafy greens such as spinach, kale, sergey greens, and mustard greens  Eat yellow and orange vegetables such as carrots, sweet potatoes, and winter squash  · Eat a variety of fruits every day  Choose fresh or canned fruit (canned in its own juice or light syrup) instead of juice  Fruit juice has very little or no fiber  · Eat low-fat dairy foods  Drink fat-free (skim) milk or 1% milk  Eat fat-free yogurt and low-fat cottage cheese  Try low-fat cheeses such as mozzarella and other reduced-fat cheeses  · Choose meat and other protein foods that are low in fat  Choose beans or other legumes such as split peas or lentils  Choose fish, skinless poultry (chicken or turkey), or lean cuts of red meat (beef or pork)  Before you cook meat or poultry, cut off any visible fat  · Use less fat and oil  Try baking foods instead of frying them  Add less fat, such as margarine, sour cream, regular salad dressing and mayonnaise to foods  Eat fewer high-fat foods  Some examples of high-fat foods include french fries, doughnuts, ice cream, and cakes  · Eat fewer sweets  Limit foods and drinks that are high in sugar  This includes candy, cookies, regular soda, and sweetened drinks    Exercise:  Exercise at least 30 minutes per day on most days of the week  Some examples of exercise include walking, biking, dancing, and swimming  You can also fit in more physical activity by taking the stairs instead of the elevator or parking farther away from stores  Ask your healthcare provider about the best exercise plan for you  © Copyright [employer] 2018 Information is for End User's use only and may not be sold, redistributed or otherwise used for commercial purposes   All illustrations and images included in CareNotes® are the copyrighted property of A D [employer] M [employer]  or [address] [place] [address]

## 2022-12-28 ENCOUNTER — TELEPHONE (OUTPATIENT)
Dept: FAMILY MEDICINE CLINIC | Facility: CLINIC | Age: 72
End: 2022-12-28

## 2022-12-28 NOTE — TELEPHONE ENCOUNTER
Please let her know I would need to see him in person as the treatment for his uncontrolled Diabetes is new and there are many options that should be discussed

## 2022-12-28 NOTE — TELEPHONE ENCOUNTER
Pt sister states she saw a message on patient's My Chart from Dr Jaycob Lea stating he should go on medication for his A1C  Can you please call pt and advise if this medication will be ordered and how to take

## 2023-01-02 PROBLEM — N18.2 TYPE 2 DIABETES MELLITUS WITH STAGE 2 CHRONIC KIDNEY DISEASE, WITHOUT LONG-TERM CURRENT USE OF INSULIN (HCC): Status: ACTIVE | Noted: 2023-01-02

## 2023-01-02 PROBLEM — E11.22 CKD STAGE 2 DUE TO TYPE 2 DIABETES MELLITUS (HCC): Status: ACTIVE | Noted: 2023-01-02

## 2023-01-02 PROBLEM — E11.22 TYPE 2 DIABETES MELLITUS WITH STAGE 2 CHRONIC KIDNEY DISEASE, WITHOUT LONG-TERM CURRENT USE OF INSULIN (HCC): Status: ACTIVE | Noted: 2023-01-02

## 2023-01-02 PROBLEM — N18.2 CKD STAGE 2 DUE TO TYPE 2 DIABETES MELLITUS (HCC): Status: ACTIVE | Noted: 2023-01-02

## 2023-01-03 DIAGNOSIS — G43.009 MIGRAINE WITHOUT AURA AND WITHOUT STATUS MIGRAINOSUS, NOT INTRACTABLE: ICD-10-CM

## 2023-01-03 RX ORDER — SUMATRIPTAN 100 MG/1
100 TABLET, FILM COATED ORAL ONCE AS NEEDED
Qty: 9 TABLET | Refills: 1 | Status: SHIPPED | OUTPATIENT
Start: 2023-01-03

## 2023-01-08 PROBLEM — R73.01 IFG (IMPAIRED FASTING GLUCOSE): Status: RESOLVED | Noted: 2022-12-14 | Resolved: 2023-01-08

## 2023-01-10 ENCOUNTER — OFFICE VISIT (OUTPATIENT)
Dept: FAMILY MEDICINE CLINIC | Facility: CLINIC | Age: 73
End: 2023-01-10

## 2023-01-10 VITALS
BODY MASS INDEX: 34.48 KG/M2 | SYSTOLIC BLOOD PRESSURE: 130 MMHG | TEMPERATURE: 97.7 F | WEIGHT: 187.4 LBS | RESPIRATION RATE: 16 BRPM | HEART RATE: 61 BPM | DIASTOLIC BLOOD PRESSURE: 78 MMHG | HEIGHT: 62 IN

## 2023-01-10 DIAGNOSIS — N18.2 TYPE 2 DIABETES MELLITUS WITH STAGE 2 CHRONIC KIDNEY DISEASE, WITHOUT LONG-TERM CURRENT USE OF INSULIN (HCC): Primary | ICD-10-CM

## 2023-01-10 DIAGNOSIS — E11.22 TYPE 2 DIABETES MELLITUS WITH STAGE 2 CHRONIC KIDNEY DISEASE, WITHOUT LONG-TERM CURRENT USE OF INSULIN (HCC): Primary | ICD-10-CM

## 2023-01-10 DIAGNOSIS — N18.2 CKD STAGE 2 DUE TO TYPE 2 DIABETES MELLITUS (HCC): ICD-10-CM

## 2023-01-10 DIAGNOSIS — E11.22 CKD STAGE 2 DUE TO TYPE 2 DIABETES MELLITUS (HCC): ICD-10-CM

## 2023-01-10 DIAGNOSIS — I10 BENIGN ESSENTIAL HYPERTENSION: ICD-10-CM

## 2023-01-10 RX ORDER — DULAGLUTIDE 0.75 MG/.5ML
0.75 INJECTION, SOLUTION SUBCUTANEOUS
Qty: 6 ML | Refills: 1 | Status: SHIPPED | OUTPATIENT
Start: 2023-01-10

## 2023-01-10 NOTE — PROGRESS NOTES
Assessment/Plan:    No problem-specific Assessment & Plan notes found for this encounter  New DM2  Risks/benefits of medication options discussed  Reluctant to use metformin for possible GI side effects  F/u 3m  DM diet education recommended    htn stable  HA unchanged  Ckd2, stay hydrated     Diagnoses and all orders for this visit:    Type 2 diabetes mellitus with stage 2 chronic kidney disease, without long-term current use of insulin (HCC)  -     Microalbumin / creatinine urine ratio; Future  -     Comprehensive metabolic panel; Future  -     Hemoglobin A1C; Future  -     dulaglutide (Trulicity) 1 23 SG/5 2WE injection; Inject 0 5 mL (0 75 mg total) under the skin every 7 days    CKD stage 2 due to type 2 diabetes mellitus ([facility] [address] )    Benign essential hypertension        For diabetes:  Plan A is metformin  Plan B can be trulicity or ozempic injections once a week  Plan C can be jardiance or farxiga, daily pills, pee out sugar  Plan D can be [employer] [place] or tradjenta or onglyza, works like plan B but not as powerful    Return in about 3 months (around 4/10/2023) for [address]  Subjective:      Patient ID: Kiana Paredes is a 71 y o  male  Chief Complaint   Patient presents with   • Follow-up     1 month diabetic f/u nm lpn       HPI  No diarrhea  Does not drink much water  Not on strict diet  Has bread and pasta  Lost 4#  Labs aware    The following portions of the patient's history were reviewed and updated as appropriate: allergies, current medications, past family history, past medical history, past social history, past surgical history and problem list     Review of Systems   Constitutional: Negative for chills and fever  Genitourinary: Negative for frequency           Current Outpatient Medications   Medication Sig Dispense Refill   • acetaminophen (TYLENOL) 650 mg CR tablet Take 650 mg by mouth as needed daily     • Anoro Ellipta 62 5-25 MCG/INH inhaler TAKE 1 PUFF BY MOUTH EVERY DAY 90 blister 1   • Ascorbic Acid (VITAMIN C) 500 MG CAPS Take by mouth 2 (two) times a day      • aspirin (ECOTRIN LOW STRENGTH) 81 mg EC tablet Take 81 mg by mouth if needed     • atorvastatin (LIPITOR) 10 mg tablet TAKE 1 TABLET BY MOUTH EVERY DAY 90 tablet 1   • clotrimazole-betamethasone (LOTRISONE) 1-0 05 % cream Apply topically 2 (two) times a day Short term, right arm (Patient taking differently: Apply topically as needed Short term, right arm) 45 g 0   • Cyanocobalamin (VITAMIN B 12 PO) Take by mouth     • dulaglutide (Trulicity) 1 13 IS/6 9OP injection Inject 0 5 mL (0 75 mg total) under the skin every 7 days 6 mL 1   • fluticasone (FLONASE) 50 mcg/act nasal spray 1 spray into each nostril daily     • gabapentin (NEURONTIN) 300 mg capsule Take 1 capsule (300 mg total) by mouth 2 (two) times a day 180 capsule 1   • Magnesium Oxide 400 MG CAPS Take 400 mg by mouth 2 (two) times a day      • montelukast (SINGULAIR) 10 mg tablet TAKE 1 TABLET (10 MG TOTAL) BY MOUTH DAILY WITH BREAKFAST 90 tablet 1   • Multiple Vitamins-Minerals (PRESERVISION AREDS PO) Take by mouth daily     • mupirocin (BACTROBAN) 2 % cream Apply topically 3 (three) times a day Prn, hands 30 g 5   • omega-3-acid ethyl esters (LOVAZA) 1 g capsule TAKE 2 CAPSULES BY MOUTH TWICE A  capsule 1   • omeprazole (PriLOSEC OTC) 20 MG tablet Take 20 mg by mouth daily     • propranolol (INDERAL LA) 160 mg Take 1 capsule (160 mg total) by mouth daily 90 capsule 3   • ramipril (ALTACE) 10 MG capsule TAKE 2 CAPSULES BY MOUTH EVERY  capsule 1   • SUMAtriptan (IMITREX) 100 mg tablet TAKE 1 TABLET (100 MG TOTAL) BY MOUTH ONCE AS NEEDED FOR MIGRAINE FOR UP TO 1 DOSE 9 tablet 1   • verapamil (CALAN-SR) 180 mg CR tablet Take 1 tablet (180 mg total) by mouth daily at bedtime 90 tablet 3   • meloxicam (MOBIC) 7 5 mg tablet Take 1 tablet (7 5 mg total) by mouth daily (Patient not taking: Reported on 10/27/2022) 90 tablet 1     No current facility-administered medications for this visit  Objective:    /78   Pulse 61   Temp 97 7 °F (36 5 °C)   Resp 16   Ht 5' 1 5" (1 562 m)   Wt 85 kg (187 lb 6 4 oz)   BMI 34 84 kg/m²        Physical Exam  Vitals and nursing note reviewed  Constitutional:       General: He is not in acute distress  Appearance: He is well-developed  He is not ill-appearing  HENT:      Head: Normocephalic  Right Ear: Tympanic membrane normal       Left Ear: Tympanic membrane normal    Eyes:      General: No scleral icterus  Conjunctiva/sclera: Conjunctivae normal    Cardiovascular:      Rate and Rhythm: Normal rate and regular rhythm  Pulses: no weak pulses          Dorsalis pedis pulses are 2+ on the right side and 2+ on the left side  Heart sounds: No murmur heard  Pulmonary:      Effort: Pulmonary effort is normal  No respiratory distress  Breath sounds: No wheezing  Abdominal:      Palpations: Abdomen is soft  Musculoskeletal:         General: No deformity  Cervical back: Neck supple  Right lower leg: No edema  Left lower leg: No edema  Skin:     General: Skin is warm and dry  Coloration: Skin is not pale  Neurological:      Mental Status: He is alert  Psychiatric:         Mood and Affect: Mood normal          Behavior: Behavior normal          Thought Content: Thought content normal          Diabetic Foot Exam    Patient's shoes and socks removed  Right Foot/Ankle   Right Foot Inspection  Skin Exam: skin intact  No abnormal color  Sensory   Monofilament testing: intact    Vascular  The right DP pulse is 2+  Left Foot/Ankle  Left Foot Inspection  Skin Exam: skin intact  Normal color  Sensory   Monofilament testing: intact    Vascular  The left DP pulse is 2+       Assign Risk Category  No deformity present  No loss of protective sensation  No weak pulses  Risk: 0         Gabi Warren DO

## 2023-01-10 NOTE — PATIENT INSTRUCTIONS
For diabetes:  Plan A is metformin  Plan B can be trulicity or ozempic injections once a week  Plan C can be jardiance or farxiga, daily pills, pee out sugar  Plan D can be [facility] [employer] [place] or tradjenta or onglyza, works like plan B but not as powerful    If you do start metformin, then take 500mg once a day for first 1-2 weeks then increase to 2 per day, watch for diarrhea

## 2023-01-13 ENCOUNTER — TELEPHONE (OUTPATIENT)
Dept: GASTROENTEROLOGY | Facility: CLINIC | Age: 73
End: 2023-01-13

## 2023-01-13 NOTE — TELEPHONE ENCOUNTER
----- Message from Duyen Ruvalcaba sent at 1/11/2023 10:25 AM EST -----  Regarding: Overdue colon  Patient had Colonoscopy on 9/22/21 and was due for 6 months due to poor prep and history of colon polyps, please schedule patient for overdue colon, thank you!

## 2023-02-08 DIAGNOSIS — E78.5 HYPERLIPIDEMIA LDL GOAL <130: ICD-10-CM

## 2023-02-09 RX ORDER — ATORVASTATIN CALCIUM 10 MG/1
TABLET, FILM COATED ORAL
Qty: 90 TABLET | Refills: 1 | Status: SHIPPED | OUTPATIENT
Start: 2023-02-09

## 2023-03-22 ENCOUNTER — TELEPHONE (OUTPATIENT)
Dept: PULMONOLOGY | Facility: CLINIC | Age: 73
End: 2023-03-22

## 2023-03-22 NOTE — TELEPHONE ENCOUNTER
Patient Maya Nielsen is requesting a refill for     Medication(s)  name Juan Dill    Dose 62 5-25    Last office Visit 10/19/2021  SCHEDULED F/U 03/30/23 @ [address] [facility]    Quantity (30 day or 90 day) 30

## 2023-03-25 ENCOUNTER — TELEPHONE (OUTPATIENT)
Dept: NEUROLOGY | Facility: CLINIC | Age: 73
End: 2023-03-25

## 2023-03-29 DIAGNOSIS — N18.2 TYPE 2 DIABETES MELLITUS WITH STAGE 2 CHRONIC KIDNEY DISEASE, WITHOUT LONG-TERM CURRENT USE OF INSULIN (HCC): ICD-10-CM

## 2023-03-29 DIAGNOSIS — E11.22 TYPE 2 DIABETES MELLITUS WITH STAGE 2 CHRONIC KIDNEY DISEASE, WITHOUT LONG-TERM CURRENT USE OF INSULIN (HCC): ICD-10-CM

## 2023-03-29 LAB
LEFT EYE DIABETIC RETINOPATHY: NORMAL
RIGHT EYE DIABETIC RETINOPATHY: NORMAL

## 2023-03-29 RX ORDER — DULAGLUTIDE 0.75 MG/.5ML
0.75 INJECTION, SOLUTION SUBCUTANEOUS
Qty: 6 ML | Refills: 1 | Status: SHIPPED | OUTPATIENT
Start: 2023-03-29

## 2023-03-30 ENCOUNTER — OFFICE VISIT (OUTPATIENT)
Dept: PULMONOLOGY | Facility: CLINIC | Age: 73
End: 2023-03-30

## 2023-03-30 VITALS
SYSTOLIC BLOOD PRESSURE: 126 MMHG | HEIGHT: 62 IN | OXYGEN SATURATION: 93 % | TEMPERATURE: 98.3 F | BODY MASS INDEX: 33.97 KG/M2 | HEART RATE: 53 BPM | DIASTOLIC BLOOD PRESSURE: 66 MMHG | WEIGHT: 184.6 LBS | RESPIRATION RATE: 18 BRPM

## 2023-03-30 DIAGNOSIS — R06.09 DOE (DYSPNEA ON EXERTION): Primary | ICD-10-CM

## 2023-03-30 DIAGNOSIS — J45.30 MILD PERSISTENT ASTHMA WITHOUT COMPLICATION: ICD-10-CM

## 2023-03-30 DIAGNOSIS — E66.01 CLASS 2 SEVERE OBESITY DUE TO EXCESS CALORIES WITH SERIOUS COMORBIDITY AND BODY MASS INDEX (BMI) OF 35.0 TO 35.9 IN ADULT (HCC): ICD-10-CM

## 2023-03-30 DIAGNOSIS — R06.09 DOE (DYSPNEA ON EXERTION): ICD-10-CM

## 2023-03-30 RX ORDER — BUDESONIDE AND FORMOTEROL FUMARATE DIHYDRATE 160; 4.5 UG/1; UG/1
2 AEROSOL RESPIRATORY (INHALATION) 2 TIMES DAILY
Qty: 30.6 G | Refills: 3 | Status: SHIPPED | OUTPATIENT
Start: 2023-03-30 | End: 2023-04-03

## 2023-03-30 NOTE — PROGRESS NOTES
"Pulmonary Follow Up Note  Adilia Valverde 68 y o  male MRN: [id number]  3/30/2023      HPI:    Patient endorses shortness of breath particular with exertion  No sensitivities to pollen, temperature, and humidity or strong scents  No significant fever chills or recent illness   + Intermittent cough typically dry but occasionally with yellow-colored sputum  Note: Patient is a lifelong non-smoker with no apparent toxic lung injuries    Exercise Tolerance: Unable to quantify     Meds:  Anoro daily  Albuterol as needed    ROS:  Constitutional: - Fatigue, - chills, - fever, - weight change  HEENT: - rhinorrhea, - sneezing, - sore throat  Respiratory: + Occasional cough, + exertional shortness of breath, - wheezing  Cardiovascular: - chest pain,  -palpitations, - leg swelling  Gastrointestinal: - abdominal pain, - constipation, - diarrhea, - nausea, - vomiting  Endocrine: - cold intolerance, - heat intolerance  Genitourinary: - dysuria  Musculoskeletal: - arthralgias  Skin:- rash, - wound  Allergic/Immunologic: - allergies  Neurological: - dizziness, - numbness        Vitals: Blood pressure 126/66, pulse (!) 53, temperature 98 3 °F (36 8 °C), temperature source Temporal, resp  rate 18, height 5' 1 5\" (1 562 m), weight 83 7 kg (184 lb 9 6 oz), SpO2 93 %  , Body mass index is 34 32 kg/m²  Physical Exam:  GEN  NAD  HEENT  ncat, non icteric, MM moist  NECK  supple, no JVD, no LAD  CV  +s1s2, no mrg, RRR  PULM  CTA BL, no wrr  ABD  soft, ntnd, + BS  EXT +1 lower extremity pitting edema, no cyanosis, no clubbing  NEURO  Aox3, no focal weakness    Imaging and other studies:   I personally viewed interpret the following imaging studies:  CT chest 1/13/2021 shows    Pulmonary function testing:   PFTs 9/15/2020 shows:  Normal spirometry  Normal lung volumes  Diffusing capacity is inaccurately measured    Assessment:  Exertional dyspnea  Obesity    Plan:  · Unclear etiology of exertional dyspnea    · Doubt " this is COPD as this patient is a lifelong non-smoker with normal pulmonary function test  · Potential causes of dyspnea on exertion include generalized deconditioning, asthma or cardiac etiology  · Check echocardiogram  · Repeat pulmonary function test  · Repeat CT chest  · Discontinue Anoro daily  · Start Symbicort as needed    Return visit in 3 months  On next visit we will evaluate symptoms, echo results, PFT results, CT chest results, requirements of Symbicort    [facility]

## 2023-03-31 ENCOUNTER — TELEPHONE (OUTPATIENT)
Dept: PULMONOLOGY | Facility: CLINIC | Age: 73
End: 2023-03-31

## 2023-03-31 NOTE — TELEPHONE ENCOUNTER
Called [facility] pharmacy and was able to get his symbicort through for $75 00 for 3 inhalers, called pt and lm

## 2023-04-03 RX ORDER — BUDESONIDE AND FORMOTEROL FUMARATE DIHYDRATE 160; 4.5 UG/1; UG/1
AEROSOL RESPIRATORY (INHALATION)
Qty: 10.2 G | Refills: 3 | Status: SHIPPED | OUTPATIENT
Start: 2023-04-03

## 2023-04-04 ENCOUNTER — APPOINTMENT (OUTPATIENT)
Dept: LAB | Facility: CLINIC | Age: 73
End: 2023-04-04

## 2023-04-04 DIAGNOSIS — Z13.89 SCREENING FOR CARDIOVASCULAR, RESPIRATORY, AND GENITOURINARY DISEASES: ICD-10-CM

## 2023-04-04 DIAGNOSIS — R73.01 IFG (IMPAIRED FASTING GLUCOSE): ICD-10-CM

## 2023-04-04 DIAGNOSIS — Z12.5 PROSTATE CANCER SCREENING: ICD-10-CM

## 2023-04-04 DIAGNOSIS — Z13.6 SCREENING FOR CARDIOVASCULAR, RESPIRATORY, AND GENITOURINARY DISEASES: ICD-10-CM

## 2023-04-04 DIAGNOSIS — I10 BENIGN ESSENTIAL HYPERTENSION: ICD-10-CM

## 2023-04-04 DIAGNOSIS — N18.2 TYPE 2 DIABETES MELLITUS WITH STAGE 2 CHRONIC KIDNEY DISEASE, WITHOUT LONG-TERM CURRENT USE OF INSULIN (HCC): ICD-10-CM

## 2023-04-04 DIAGNOSIS — Z13.83 SCREENING FOR CARDIOVASCULAR, RESPIRATORY, AND GENITOURINARY DISEASES: ICD-10-CM

## 2023-04-04 DIAGNOSIS — E11.22 TYPE 2 DIABETES MELLITUS WITH STAGE 2 CHRONIC KIDNEY DISEASE, WITHOUT LONG-TERM CURRENT USE OF INSULIN (HCC): ICD-10-CM

## 2023-04-04 LAB
ALBUMIN SERPL BCP-MCNC: 3.7 G/DL (ref 3.5–5)
ALP SERPL-CCNC: 140 U/L (ref 46–116)
ALT SERPL W P-5'-P-CCNC: 90 U/L (ref 12–78)
ANION GAP SERPL CALCULATED.3IONS-SCNC: 5 MMOL/L (ref 4–13)
AST SERPL W P-5'-P-CCNC: 45 U/L (ref 5–45)
BILIRUB SERPL-MCNC: 0.58 MG/DL (ref 0.2–1)
BUN SERPL-MCNC: 20 MG/DL (ref 5–25)
CALCIUM SERPL-MCNC: 9.4 MG/DL (ref 8.3–10.1)
CHLORIDE SERPL-SCNC: 111 MMOL/L (ref 96–108)
CHOLEST SERPL-MCNC: 181 MG/DL
CO2 SERPL-SCNC: 22 MMOL/L (ref 21–32)
CREAT SERPL-MCNC: 1.15 MG/DL (ref 0.6–1.3)
CREAT UR-MCNC: 143 MG/DL
GFR SERPL CREATININE-BSD FRML MDRD: 63 ML/MIN/1.73SQ M
GLUCOSE P FAST SERPL-MCNC: 171 MG/DL (ref 65–99)
HDLC SERPL-MCNC: 40 MG/DL
LDLC SERPL DIRECT ASSAY-MCNC: 93 MG/DL (ref 0–100)
MICROALBUMIN UR-MCNC: 14 MG/L (ref 0–20)
MICROALBUMIN/CREAT 24H UR: 10 MG/G CREATININE (ref 0–30)
POTASSIUM SERPL-SCNC: 4.7 MMOL/L (ref 3.5–5.3)
PROT SERPL-MCNC: 7.2 G/DL (ref 6.4–8.4)
PSA SERPL-MCNC: 2.7 NG/ML (ref 0–4)
SODIUM SERPL-SCNC: 138 MMOL/L (ref 135–147)
TRIGL SERPL-MCNC: 425 MG/DL

## 2023-04-05 LAB
EST. AVERAGE GLUCOSE BLD GHB EST-MCNC: 189 MG/DL
HBA1C MFR BLD: 8.2 %

## 2023-04-27 ENCOUNTER — TELEPHONE (OUTPATIENT)
Age: 73
End: 2023-04-27

## 2023-04-27 NOTE — TELEPHONE ENCOUNTER
Left message for pt to call office to schedule fu for after 5/24/2023 with dr Oliver Omalley    Was going to offer [facility] location available slot

## 2023-05-24 ENCOUNTER — HOSPITAL ENCOUNTER (OUTPATIENT)
Dept: NON INVASIVE DIAGNOSTICS | Facility: HOSPITAL | Age: 73
Discharge: HOME | End: 2023-05-24
Attending: INTERNAL MEDICINE

## 2023-05-24 ENCOUNTER — HOSPITAL ENCOUNTER (OUTPATIENT)
Dept: PULMONOLOGY | Facility: HOSPITAL | Age: 73
Discharge: HOME | End: 2023-05-24
Attending: INTERNAL MEDICINE

## 2023-05-24 VITALS
DIASTOLIC BLOOD PRESSURE: 66 MMHG | SYSTOLIC BLOOD PRESSURE: 126 MMHG | BODY MASS INDEX: 33.86 KG/M2 | HEART RATE: 54 BPM | WEIGHT: 184 LBS | HEIGHT: 62 IN

## 2023-05-24 DIAGNOSIS — R06.09 DOE (DYSPNEA ON EXERTION): ICD-10-CM

## 2023-05-24 LAB
AORTIC ROOT: 3.6 CM
APICAL FOUR CHAMBER EJECTION FRACTION: 66 %
ASCENDING AORTA: 3.4 CM
AV LVOT MEAN GRADIENT: 1 MMHG
AV LVOT PEAK GRADIENT: 2 MMHG
DOP CALC LVOT PEAK VEL VTI: 17.9 CM
DOP CALC LVOT PEAK VEL: 0.63 M/S
E WAVE DECELERATION TIME: 246 MS
FRACTIONAL SHORTENING: 27 (ref 28–44)
INTERVENTRICULAR SEPTUM IN DIASTOLE (PARASTERNAL SHORT AXIS VIEW): 1.1 CM
INTERVENTRICULAR SEPTUM: 1.1 CM (ref 0.6–1.1)
LA/AORTA RATIO 2D: 1.03
LAAS-AP2: 25.9 CM2
LAAS-AP4: 20.8 CM2
LEFT ATRIUM SIZE: 3.7 CM
LEFT INTERNAL DIMENSION IN SYSTOLE: 3.7 CM (ref 2.1–4)
LEFT VENTRICULAR INTERNAL DIMENSION IN DIASTOLE: 5.1 CM (ref 3.5–6)
LEFT VENTRICULAR POSTERIOR WALL IN END DIASTOLE: 0.9 CM
LEFT VENTRICULAR STROKE VOLUME: 66 ML
LVSV (TEICH): 66 ML
MV E'TISSUE VEL-SEP: 7 CM/S
MV PEAK A VEL: 1.21 M/S
MV PEAK E VEL: 89 CM/S
MV STENOSIS PRESSURE HALF TIME: 72 MS
MV VALVE AREA P 1/2 METHOD: 3.06
SL CV LV EF: 60
SL CV PED ECHO LEFT VENTRICLE DIASTOLIC VOLUME (MOD BIPLANE) 2D: 124 ML
SL CV PED ECHO LEFT VENTRICLE SYSTOLIC VOLUME (MOD BIPLANE) 2D: 58 ML
TR MAX PG: 35 MMHG
TR PEAK VELOCITY: 3 M/S
TRICUSPID ANNULAR PLANE SYSTOLIC EXCURSION: 2.8 CM
TRICUSPID VALVE PEAK REGURGITATION VELOCITY: 2.96 M/S

## 2023-05-24 RX ORDER — ALBUTEROL SULFATE 2.5 MG/3ML
2.5 SOLUTION RESPIRATORY (INHALATION) ONCE
Status: COMPLETED | OUTPATIENT
Start: 2023-05-24 | End: 2023-05-24

## 2023-05-24 RX ADMIN — ALBUTEROL SULFATE 2.5 MG: 2.5 SOLUTION RESPIRATORY (INHALATION) at 10:29

## 2023-06-01 DIAGNOSIS — E78.1 HIGH TRIGLYCERIDES: ICD-10-CM

## 2023-06-01 DIAGNOSIS — I10 BENIGN ESSENTIAL HYPERTENSION: ICD-10-CM

## 2023-06-01 RX ORDER — RAMIPRIL 10 MG/1
CAPSULE ORAL
Qty: 180 CAPSULE | Refills: 1 | Status: SHIPPED | OUTPATIENT
Start: 2023-06-01

## 2023-06-01 RX ORDER — OMEGA-3-ACID ETHYL ESTERS 1 G/1
CAPSULE, LIQUID FILLED ORAL
Qty: 360 CAPSULE | Refills: 1 | Status: SHIPPED | OUTPATIENT
Start: 2023-06-01

## 2023-06-06 ENCOUNTER — HOSPITAL ENCOUNTER (OUTPATIENT)
Dept: RADIOLOGY | Facility: HOSPITAL | Age: 73
Discharge: HOME | End: 2023-06-06
Attending: INTERNAL MEDICINE
Payer: COMMERCIAL

## 2023-06-06 DIAGNOSIS — R06.09 DOE (DYSPNEA ON EXERTION): ICD-10-CM

## 2023-06-06 DIAGNOSIS — J45.30 MILD PERSISTENT ASTHMA WITHOUT COMPLICATION: ICD-10-CM

## 2023-06-06 PROCEDURE — 71250 CT THORAX DX C-: CPT

## 2023-06-06 PROCEDURE — G1004 CDSM NDSC: HCPCS

## 2023-06-13 ENCOUNTER — OFFICE VISIT (OUTPATIENT)
Dept: FAMILY MEDICINE CLINIC | Facility: CLINIC | Age: 73
End: 2023-06-13
Payer: COMMERCIAL

## 2023-06-13 VITALS
OXYGEN SATURATION: 99 % | RESPIRATION RATE: 18 BRPM | HEIGHT: 62 IN | WEIGHT: 184.8 LBS | DIASTOLIC BLOOD PRESSURE: 82 MMHG | HEART RATE: 72 BPM | BODY MASS INDEX: 34.01 KG/M2 | TEMPERATURE: 98 F | SYSTOLIC BLOOD PRESSURE: 144 MMHG

## 2023-06-13 DIAGNOSIS — N18.2 CKD STAGE 2 DUE TO TYPE 2 DIABETES MELLITUS (HCC): ICD-10-CM

## 2023-06-13 DIAGNOSIS — E11.22 TYPE 2 DIABETES MELLITUS WITH STAGE 2 CHRONIC KIDNEY DISEASE, WITHOUT LONG-TERM CURRENT USE OF INSULIN (HCC): Primary | ICD-10-CM

## 2023-06-13 DIAGNOSIS — I10 BENIGN ESSENTIAL HYPERTENSION: ICD-10-CM

## 2023-06-13 DIAGNOSIS — N18.2 TYPE 2 DIABETES MELLITUS WITH STAGE 2 CHRONIC KIDNEY DISEASE, WITHOUT LONG-TERM CURRENT USE OF INSULIN (HCC): Primary | ICD-10-CM

## 2023-06-13 DIAGNOSIS — G43.009 MIGRAINE WITHOUT AURA AND WITHOUT STATUS MIGRAINOSUS, NOT INTRACTABLE: ICD-10-CM

## 2023-06-13 DIAGNOSIS — E11.22 CKD STAGE 2 DUE TO TYPE 2 DIABETES MELLITUS (HCC): ICD-10-CM

## 2023-06-13 DIAGNOSIS — Z12.11 COLON CANCER SCREENING: ICD-10-CM

## 2023-06-13 PROCEDURE — 99215 OFFICE O/P EST HI 40 MIN: CPT | Performed by: FAMILY MEDICINE

## 2023-06-13 PROCEDURE — 93000 ELECTROCARDIOGRAM COMPLETE: CPT | Performed by: FAMILY MEDICINE

## 2023-06-13 RX ORDER — VERAPAMIL HYDROCHLORIDE 240 MG/1
240 TABLET, FILM COATED, EXTENDED RELEASE ORAL
Qty: 90 TABLET | Refills: 1 | Status: SHIPPED | OUTPATIENT
Start: 2023-06-13

## 2023-06-13 NOTE — PROGRESS NOTES
Assessment/Plan:    No problem-specific Assessment & Plan notes found for this encounter  DM2 improving, increase trulicity   75mg/d to 1 5mg/w    Try to lose wt    htn not at goal, increase verapamil 180mg/d to 240mg/d    Headaches worse, recheck 1m    ckd2 stable    preop clearance from done, ekg wnl     Diagnoses and all orders for this visit:    Type 2 diabetes mellitus with stage 2 chronic kidney disease, without long-term current use of insulin (HCC)  -     Hemoglobin A1C; Future  -     Comprehensive metabolic panel; Future  -     dulaglutide (Trulicity) 1 5 FK/4 3NE injection; Inject 0 5 mL (1 5 mg total) under the skin every 7 days    Colon cancer screening  -     Ambulatory referral to Gastroenterology; Future    CKD stage 2 due to type 2 diabetes mellitus (HCC)    Benign essential hypertension  -     POCT ECG    Migraine without aura and without status migrainosus, not intractable  -     verapamil (CALAN-SR) 240 mg CR tablet; Take 1 tablet (240 mg total) by mouth daily at bedtime              Return in about 1 month (around 7/13/2023) for Recheck  Subjective:      Patient ID: Manjinder Francis is a 68 y o  male  Chief Complaint   Patient presents with   • Follow-up   • Diabetes     L  [place]/LPN       HPI  More headaches noted  2 per week  More rescue meds lately    On trulicity 9 66MV/H  No wt loss  Sticking with diet  Goes to gym  2-3x/w  Some walking, not too mcuh    Getting cataract surgery   On 6/28/23  Needs ekg and preop form done    The following portions of the patient's history were reviewed and updated as appropriate: allergies, current medications, past family history, past medical history, past social history, past surgical history and problem list     Review of Systems   Respiratory: Negative for shortness of breath  Cardiovascular: Negative for chest pain           Current Outpatient Medications   Medication Sig Dispense Refill   • acetaminophen (TYLENOL) 650 mg CR tablet Take 650 mg by "mouth as needed daily     • Ascorbic Acid (VITAMIN C) 500 MG CAPS Take by mouth 2 (two) times a day      • aspirin (ECOTRIN LOW STRENGTH) 81 mg EC tablet Take 81 mg by mouth if needed     • atorvastatin (LIPITOR) 10 mg tablet TAKE 1 TABLET BY MOUTH EVERY DAY 90 tablet 1   • Cyanocobalamin (VITAMIN B 12 PO) Take by mouth     • dulaglutide (Trulicity) 1 5 DK/3 5NS injection Inject 0 5 mL (1 5 mg total) under the skin every 7 days 6 mL 1   • fluticasone (FLONASE) 50 mcg/act nasal spray 1 spray into each nostril daily     • gabapentin (NEURONTIN) 300 mg capsule Take 1 capsule (300 mg total) by mouth 2 (two) times a day 180 capsule 1   • Magnesium Oxide 400 MG CAPS Take 400 mg by mouth 2 (two) times a day      • montelukast (SINGULAIR) 10 mg tablet TAKE 1 TABLET (10 MG TOTAL) BY MOUTH DAILY WITH BREAKFAST 90 tablet 1   • Multiple Vitamins-Minerals (PRESERVISION AREDS PO) Take by mouth daily     • omega-3-acid ethyl esters (LOVAZA) 1 g capsule TAKE 2 CAPSULES BY MOUTH TWICE A  capsule 1   • omeprazole (PriLOSEC OTC) 20 MG tablet Take 20 mg by mouth daily     • propranolol (INDERAL LA) 160 mg Take 1 capsule (160 mg total) by mouth daily 90 capsule 3   • ramipril (ALTACE) 10 MG capsule TAKE 2 CAPSULES BY MOUTH EVERY  capsule 1   • SUMAtriptan (IMITREX) 100 mg tablet TAKE 1 TABLET (100 MG TOTAL) BY MOUTH ONCE AS NEEDED FOR MIGRAINE FOR UP TO 1 DOSE 9 tablet 1   • Symbicort 160-4 5 MCG/ACT inhaler INHALE 2 PUFFS BY MOUTH 2 TIMES A DAY RINSE MOUTH AFTER USE  10 2 g 3   • verapamil (CALAN-SR) 240 mg CR tablet Take 1 tablet (240 mg total) by mouth daily at bedtime 90 tablet 1     No current facility-administered medications for this visit  Objective:    /82   Pulse 72   Temp 98 °F (36 7 °C) (Tympanic)   Resp 18   Ht 5' 1 5\" (1 562 m)   Wt 83 8 kg (184 lb 12 8 oz)   SpO2 99%   BMI 34 35 kg/m²        Physical Exam  Vitals and nursing note reviewed     Constitutional:       Appearance: He is " well-developed  He is obese  He is not ill-appearing  HENT:      Head: Normocephalic  Right Ear: Tympanic membrane normal       Left Ear: Tympanic membrane normal       Nose: No congestion  Mouth/Throat:      Pharynx: No oropharyngeal exudate  Eyes:      General: No scleral icterus  Conjunctiva/sclera: Conjunctivae normal    Neck:      Vascular: No carotid bruit  Cardiovascular:      Rate and Rhythm: Normal rate and regular rhythm  Pulmonary:      Effort: Pulmonary effort is normal  No respiratory distress  Breath sounds: No wheezing  Abdominal:      Palpations: Abdomen is soft  Tenderness: There is no abdominal tenderness  Musculoskeletal:         General: No deformity  Cervical back: Neck supple  Right lower leg: No edema  Left lower leg: No edema  Skin:     General: Skin is warm and dry  Coloration: Skin is not pale  Neurological:      Mental Status: He is alert  Motor: No weakness  Gait: Gait normal    Psychiatric:         Mood and Affect: Mood normal          Behavior: Behavior normal          Thought Content: Thought content normal          ekg wnl    BMI Counseling: Body mass index is 34 35 kg/m²  The BMI is above normal  Nutrition recommendations include decreasing portion sizes and moderation in carbohydrate intake  Exercise recommendations include exercising 3-5 times per week  No pharmacotherapy was ordered  Rationale for BMI follow-up plan is due to patient being overweight or obese  41 minutes spent with patient and with chart review and documentation completion           Jessica Simpson DO

## 2023-07-11 ENCOUNTER — OFFICE VISIT (OUTPATIENT)
Dept: FAMILY MEDICINE CLINIC | Facility: CLINIC | Age: 73
End: 2023-07-11
Payer: COMMERCIAL

## 2023-07-11 ENCOUNTER — TELEPHONE (OUTPATIENT)
Dept: ADMINISTRATIVE | Facility: OTHER | Age: 73
End: 2023-07-11

## 2023-07-11 VITALS
BODY MASS INDEX: 34.04 KG/M2 | TEMPERATURE: 97.5 F | SYSTOLIC BLOOD PRESSURE: 140 MMHG | HEART RATE: 56 BPM | WEIGHT: 185 LBS | HEIGHT: 62 IN | DIASTOLIC BLOOD PRESSURE: 82 MMHG

## 2023-07-11 DIAGNOSIS — G43.009 MIGRAINE WITHOUT AURA AND WITHOUT STATUS MIGRAINOSUS, NOT INTRACTABLE: ICD-10-CM

## 2023-07-11 DIAGNOSIS — E11.22 CKD STAGE 2 DUE TO TYPE 2 DIABETES MELLITUS (HCC): ICD-10-CM

## 2023-07-11 DIAGNOSIS — N18.2 CKD STAGE 2 DUE TO TYPE 2 DIABETES MELLITUS (HCC): ICD-10-CM

## 2023-07-11 DIAGNOSIS — E11.22 TYPE 2 DIABETES MELLITUS WITH STAGE 2 CHRONIC KIDNEY DISEASE, WITHOUT LONG-TERM CURRENT USE OF INSULIN (HCC): Primary | ICD-10-CM

## 2023-07-11 DIAGNOSIS — N18.2 TYPE 2 DIABETES MELLITUS WITH STAGE 2 CHRONIC KIDNEY DISEASE, WITHOUT LONG-TERM CURRENT USE OF INSULIN (HCC): Primary | ICD-10-CM

## 2023-07-11 DIAGNOSIS — I10 BENIGN ESSENTIAL HYPERTENSION: ICD-10-CM

## 2023-07-11 PROBLEM — R73.09 ABNORMAL GLUCOSE: Status: RESOLVED | Noted: 2020-08-19 | Resolved: 2023-07-11

## 2023-07-11 PROCEDURE — 99214 OFFICE O/P EST MOD 30 MIN: CPT | Performed by: FAMILY MEDICINE

## 2023-07-11 RX ORDER — OFLOXACIN 3 MG/ML
SOLUTION/ DROPS OPHTHALMIC
COMMUNITY
Start: 2023-06-23

## 2023-07-11 RX ORDER — BROMFENAC SODIUM 0.7 MG/ML
SOLUTION/ DROPS OPHTHALMIC
COMMUNITY
Start: 2023-06-22

## 2023-07-11 RX ORDER — PREDNISOLONE ACETATE 10 MG/ML
SUSPENSION/ DROPS OPHTHALMIC
COMMUNITY
Start: 2023-06-22

## 2023-07-11 NOTE — TELEPHONE ENCOUNTER
----- Message from Cheri Lopez sent at 7/11/2023  9:00 AM EDT -----  07/11/23 9:03 AM    Hello, our patient Samir Cristobal has had Diabetic Eye Exam completed/performed. Please assist in updating the patient chart by making an External outreach to [facility] located in [address] [place] [address]. The date of service is between January 2023-July 2023.     Thank you,  Vanessa Bennett  [facility]

## 2023-07-11 NOTE — PROGRESS NOTES
Assessment/Plan:    No problem-specific Assessment & Plan notes found for this encounter. Cont same verapamil, htn and ha better    Tolerates trulicity 6.7TI weekly    Diet reviewed, he will try    F/u after labs     Diagnoses and all orders for this visit:    Type 2 diabetes mellitus with stage 2 chronic kidney disease, without long-term current use of insulin (HCC)    Benign essential hypertension    CKD stage 2 due to type 2 diabetes mellitus (HCC)    Migraine without aura and without status migrainosus, not intractable    Other orders  -     prednisoLONE acetate (PRED FORTE) 1 % ophthalmic suspension; LOCATION: RIGHT EYE. INSTILL ONE DROP IN RIGHT EYE 4 TIMES A DAY START MONDAY BEFORE SURGERY. -     ofloxacin (OCUFLOX) 0.3 % ophthalmic solution; LOCATION: RIGHT EYE. APPLY ONE DROP IN RIGHT EYE 4 TIMES A DAY START MONDAY BEFORE SURGERY. -     Prolensa 0.07 % SOLN; LOCATION: RIGHT EYE. APPLY ONE DROP IN RIGHT EYE EVERY MORNING STARTING MONDAY BEFORE SURGERY            Return for Next scheduled follow up. Subjective:      Patient ID: Carie Lu is a 68 y.o. male. Chief Complaint   Patient presents with   • Hypertension     Bp check lw cma       HPI  Gym still, 2-3x  Works hard in gym  Not much water daily    Tolerating 1.5mg/d  3 rd week  No smbg    No recent headaches    Appetite is the same    Cataract surgery went ok  No edema  No constipation    Some bad snacks    The following portions of the patient's history were reviewed and updated as appropriate: allergies, current medications, past family history, past medical history, past social history, past surgical history and problem list.    Review of Systems   Constitutional: Negative for chills and fever. Cardiovascular: Negative for leg swelling. Gastrointestinal: Negative for constipation.          Current Outpatient Medications   Medication Sig Dispense Refill   • acetaminophen (TYLENOL) 650 mg CR tablet Take 650 mg by mouth as needed daily • Ascorbic Acid (VITAMIN C) 500 MG CAPS Take by mouth 2 (two) times a day      • aspirin (ECOTRIN LOW STRENGTH) 81 mg EC tablet Take 81 mg by mouth if needed     • atorvastatin (LIPITOR) 10 mg tablet TAKE 1 TABLET BY MOUTH EVERY DAY 90 tablet 1   • Cyanocobalamin (VITAMIN B 12 PO) Take by mouth     • dulaglutide (Trulicity) 1.5 TH/3.4WY injection Inject 0.5 mL (1.5 mg total) under the skin every 7 days 6 mL 1   • fluticasone (FLONASE) 50 mcg/act nasal spray 1 spray into each nostril daily     • gabapentin (NEURONTIN) 300 mg capsule Take 1 capsule (300 mg total) by mouth 2 (two) times a day 180 capsule 1   • Magnesium Oxide 400 MG CAPS Take 400 mg by mouth 2 (two) times a day      • montelukast (SINGULAIR) 10 mg tablet TAKE 1 TABLET (10 MG TOTAL) BY MOUTH DAILY WITH BREAKFAST 90 tablet 1   • Multiple Vitamins-Minerals (PRESERVISION AREDS PO) Take by mouth daily     • ofloxacin (OCUFLOX) 0.3 % ophthalmic solution LOCATION: RIGHT EYE. APPLY ONE DROP IN RIGHT EYE 4 TIMES A DAY START MONDAY BEFORE SURGERY. • omega-3-acid ethyl esters (LOVAZA) 1 g capsule TAKE 2 CAPSULES BY MOUTH TWICE A  capsule 1   • omeprazole (PriLOSEC OTC) 20 MG tablet Take 20 mg by mouth daily     • prednisoLONE acetate (PRED FORTE) 1 % ophthalmic suspension LOCATION: RIGHT EYE. INSTILL ONE DROP IN RIGHT EYE 4 TIMES A DAY START MONDAY BEFORE SURGERY. • Prolensa 0.07 % SOLN LOCATION: RIGHT EYE.  APPLY ONE DROP IN RIGHT EYE EVERY MORNING STARTING MONDAY BEFORE SURGERY     • propranolol (INDERAL LA) 160 mg Take 1 capsule (160 mg total) by mouth daily 90 capsule 3   • ramipril (ALTACE) 10 MG capsule TAKE 2 CAPSULES BY MOUTH EVERY  capsule 1   • SUMAtriptan (IMITREX) 100 mg tablet TAKE 1 TABLET (100 MG TOTAL) BY MOUTH ONCE AS NEEDED FOR MIGRAINE FOR UP TO 1 DOSE 9 tablet 1   • Symbicort 160-4.5 MCG/ACT inhaler INHALE 2 PUFFS BY MOUTH 2 TIMES A DAY RINSE MOUTH AFTER USE. 10.2 g 3   • verapamil (CALAN-SR) 240 mg CR tablet Take 1 tablet (240 mg total) by mouth daily at bedtime 90 tablet 1     No current facility-administered medications for this visit. Objective:    /82   Pulse 56   Temp 97.5 °F (36.4 °C) (Tympanic)   Ht 5' 1.5" (1.562 m)   Wt 83.9 kg (185 lb)   BMI 34.39 kg/m²        Physical Exam  Vitals and nursing note reviewed. Constitutional:       General: He is not in acute distress. Appearance: He is well-developed. He is obese. He is not ill-appearing. HENT:      Head: Normocephalic. Right Ear: There is no impacted cerumen. Left Ear: External ear normal. There is no impacted cerumen. Ears:      Comments: Mild cerumen b/l, dry  Eyes:      General: No scleral icterus. Conjunctiva/sclera: Conjunctivae normal.   Cardiovascular:      Rate and Rhythm: Normal rate and regular rhythm. Pulmonary:      Effort: Pulmonary effort is normal. No respiratory distress. Breath sounds: No wheezing. Abdominal:      Palpations: Abdomen is soft. Musculoskeletal:         General: No deformity. Cervical back: Neck supple. Right lower leg: No edema. Left lower leg: No edema. Skin:     General: Skin is warm and dry. Coloration: Skin is not pale. Neurological:      Mental Status: He is alert. Motor: No weakness. Gait: Gait normal.   Psychiatric:         Behavior: Behavior normal.         Thought Content:  Thought content normal.              Stephanie Bedoya DO

## 2023-07-12 NOTE — TELEPHONE ENCOUNTER
Upon review of the In Basket request and the patient's chart, initial outreach has been made via telephone call to facility. Please see Contacts section for details.  spoke to MR Saldivar had numerous appts this year (sx)  Will send over the one for DM eye exam    Thank you  Yannick Hoff MA

## 2023-07-12 NOTE — TELEPHONE ENCOUNTER
Upon review of the In Basket request we were able to locate, review, and update the patient chart as requested for Diabetic Eye Exam.    Any additional questions or concerns should be emailed to the Practice Liaisons via the appropriate education email address, please do not reply via In Basket.     Thank you  Alisa Salguero MA

## 2023-08-12 PROBLEM — Z12.11 COLON CANCER SCREENING: Status: RESOLVED | Noted: 2023-06-13 | Resolved: 2023-08-12

## 2023-08-16 DIAGNOSIS — E78.5 HYPERLIPIDEMIA LDL GOAL <130: ICD-10-CM

## 2023-08-16 RX ORDER — ATORVASTATIN CALCIUM 10 MG/1
TABLET, FILM COATED ORAL
Qty: 90 TABLET | Refills: 1 | Status: SHIPPED | OUTPATIENT
Start: 2023-08-16

## 2023-09-06 DIAGNOSIS — G43.009 MIGRAINE WITHOUT AURA AND WITHOUT STATUS MIGRAINOSUS, NOT INTRACTABLE: ICD-10-CM

## 2023-09-06 RX ORDER — SUMATRIPTAN 100 MG/1
100 TABLET, FILM COATED ORAL ONCE AS NEEDED
Qty: 9 TABLET | Refills: 1 | Status: SHIPPED | OUTPATIENT
Start: 2023-09-06

## 2023-09-06 NOTE — TELEPHONE ENCOUNTER
Contact the patient to confirm the appointment on 09/16/23, spoke to the sister who requested to have the appointment rescheduled.       Appointment rescheduled 12/26/23

## 2023-09-12 ENCOUNTER — APPOINTMENT (OUTPATIENT)
Dept: LAB | Facility: CLINIC | Age: 73
End: 2023-09-12
Payer: COMMERCIAL

## 2023-09-12 DIAGNOSIS — I10 BENIGN ESSENTIAL HYPERTENSION: ICD-10-CM

## 2023-09-12 DIAGNOSIS — N18.2 TYPE 2 DIABETES MELLITUS WITH STAGE 2 CHRONIC KIDNEY DISEASE, WITHOUT LONG-TERM CURRENT USE OF INSULIN: ICD-10-CM

## 2023-09-12 DIAGNOSIS — E11.22 TYPE 2 DIABETES MELLITUS WITH STAGE 2 CHRONIC KIDNEY DISEASE, WITHOUT LONG-TERM CURRENT USE OF INSULIN: ICD-10-CM

## 2023-09-12 DIAGNOSIS — R73.01 IFG (IMPAIRED FASTING GLUCOSE): ICD-10-CM

## 2023-09-12 LAB
ALBUMIN SERPL BCP-MCNC: 3.9 G/DL (ref 3.5–5)
ALP SERPL-CCNC: 128 U/L (ref 34–104)
ALT SERPL W P-5'-P-CCNC: 85 U/L (ref 7–52)
ANION GAP SERPL CALCULATED.3IONS-SCNC: 10 MMOL/L
AST SERPL W P-5'-P-CCNC: 53 U/L (ref 13–39)
BILIRUB SERPL-MCNC: 0.57 MG/DL (ref 0.2–1)
BUN SERPL-MCNC: 22 MG/DL (ref 5–25)
CALCIUM SERPL-MCNC: 8.8 MG/DL (ref 8.4–10.2)
CHLORIDE SERPL-SCNC: 102 MMOL/L (ref 96–108)
CO2 SERPL-SCNC: 25 MMOL/L (ref 21–32)
CREAT SERPL-MCNC: 1.1 MG/DL (ref 0.6–1.3)
EST. AVERAGE GLUCOSE BLD GHB EST-MCNC: 214 MG/DL
GFR SERPL CREATININE-BSD FRML MDRD: 66 ML/MIN/1.73SQ M
GLUCOSE P FAST SERPL-MCNC: 205 MG/DL (ref 65–99)
HBA1C MFR BLD: 9.1 %
POTASSIUM SERPL-SCNC: 4.8 MMOL/L (ref 3.5–5.3)
PROT SERPL-MCNC: 6.4 G/DL (ref 6.4–8.4)
SODIUM SERPL-SCNC: 137 MMOL/L (ref 135–147)

## 2023-09-12 PROCEDURE — 36415 COLL VENOUS BLD VENIPUNCTURE: CPT

## 2023-09-12 PROCEDURE — 80053 COMPREHEN METABOLIC PANEL: CPT

## 2023-09-12 PROCEDURE — 83036 HEMOGLOBIN GLYCOSYLATED A1C: CPT

## 2023-09-13 ENCOUNTER — TELEPHONE (OUTPATIENT)
Age: 73
End: 2023-09-13

## 2023-09-13 DIAGNOSIS — M54.50 CHRONIC BILATERAL LOW BACK PAIN WITHOUT SCIATICA: ICD-10-CM

## 2023-09-13 DIAGNOSIS — E11.22 TYPE 2 DIABETES MELLITUS WITH STAGE 2 CHRONIC KIDNEY DISEASE, WITHOUT LONG-TERM CURRENT USE OF INSULIN: ICD-10-CM

## 2023-09-13 DIAGNOSIS — G89.29 CHRONIC BILATERAL LOW BACK PAIN WITHOUT SCIATICA: ICD-10-CM

## 2023-09-13 DIAGNOSIS — N18.2 TYPE 2 DIABETES MELLITUS WITH STAGE 2 CHRONIC KIDNEY DISEASE, WITHOUT LONG-TERM CURRENT USE OF INSULIN: ICD-10-CM

## 2023-09-13 RX ORDER — GABAPENTIN 300 MG/1
300 CAPSULE ORAL 2 TIMES DAILY
Qty: 180 CAPSULE | Refills: 1 | Status: SHIPPED | OUTPATIENT
Start: 2023-09-13

## 2023-09-13 NOTE — TELEPHONE ENCOUNTER
Please let her know I would like him to increase the Trulicity from 0.2SC per week to 3.0mg per week and I sent a new rx to his pharmacy

## 2023-09-13 NOTE — TELEPHONE ENCOUNTER
Spoke to pt sister she will make an appt for 3 mo F/U. She stated he also needs his gabapentin refilled.   Brigid Lala

## 2023-09-13 NOTE — TELEPHONE ENCOUNTER
Patient's sister Mahogany Kumar called that she would like to know what is the plan from Dr Bates Forward regarding his blood work results from yesterday showed his A1C went up. He is currently on Trulicity. She would need it refilled but before wants to double check if dose will be increase or not based on results. Please give her a call back to advise.

## 2023-11-19 ENCOUNTER — TELEPHONE (OUTPATIENT)
Dept: OTHER | Facility: OTHER | Age: 73
End: 2023-11-19

## 2023-11-19 NOTE — TELEPHONE ENCOUNTER
Bronson from [facility] in Clinic stated the patient tested positive for COVID today and has mild symptoms. Stated he would be faxing over the provider's notes from the visit. Fax number for office provided.

## 2023-11-20 ENCOUNTER — TELEPHONE (OUTPATIENT)
Age: 73
End: 2023-11-20

## 2023-11-20 ENCOUNTER — TELEMEDICINE (OUTPATIENT)
Dept: FAMILY MEDICINE CLINIC | Facility: CLINIC | Age: 73
End: 2023-11-20
Payer: COMMERCIAL

## 2023-11-20 DIAGNOSIS — N18.2 CKD STAGE 2 DUE TO TYPE 2 DIABETES MELLITUS: ICD-10-CM

## 2023-11-20 DIAGNOSIS — E78.49 OTHER HYPERLIPIDEMIA: ICD-10-CM

## 2023-11-20 DIAGNOSIS — N18.2 TYPE 2 DIABETES MELLITUS WITH STAGE 2 CHRONIC KIDNEY DISEASE, WITHOUT LONG-TERM CURRENT USE OF INSULIN: ICD-10-CM

## 2023-11-20 DIAGNOSIS — E11.22 CKD STAGE 2 DUE TO TYPE 2 DIABETES MELLITUS: ICD-10-CM

## 2023-11-20 DIAGNOSIS — E11.22 TYPE 2 DIABETES MELLITUS WITH STAGE 2 CHRONIC KIDNEY DISEASE, WITHOUT LONG-TERM CURRENT USE OF INSULIN: ICD-10-CM

## 2023-11-20 DIAGNOSIS — I10 BENIGN ESSENTIAL HYPERTENSION: ICD-10-CM

## 2023-11-20 DIAGNOSIS — U07.1 COVID-19: Primary | ICD-10-CM

## 2023-11-20 PROCEDURE — 99214 OFFICE O/P EST MOD 30 MIN: CPT | Performed by: FAMILY MEDICINE

## 2023-11-20 RX ORDER — NIRMATRELVIR AND RITONAVIR 300-100 MG
3 KIT ORAL 2 TIMES DAILY
Qty: 30 TABLET | Refills: 0 | Status: SHIPPED | OUTPATIENT
Start: 2023-11-20 | End: 2023-11-25

## 2023-11-20 NOTE — TELEPHONE ENCOUNTER
Patient sister called, patient was seen at [address] urgent care in [address] yesterday. He was diagnosed with COVID. They told him to call his provider today to see if he is a candidate for treatment. Sister said his sxs started on Friday. Cough, congestion, body aches, dizzy. He did just fly home from vacation last week. Urgent care did an xray and were supposed to send the results to office.

## 2023-11-20 NOTE — TELEPHONE ENCOUNTER
Patient's sister Eloise Cowan called in to follow up on earlier call to office. Paul tested Covid positive Sunday 11/19 at urgent care. Taking Mucinex and prescription cough medication   Green-brown sputum/phlegm. High risk patient with diabetes, COPD  Interested in antiviral medication. Call transferred into office to get PCP input on situation. Same Day VV scheduled for patient for 11/21 at 11:45 AM in Same Day slot per approval from office staff.

## 2023-11-20 NOTE — PROGRESS NOTES
Virtual Regular Visit    Verification of patient location:    Patient is located at Home in the following state in which I hold an active license [place]      Assessment/Plan:    Problem List Items Addressed This Visit          Active Problems    Other hyperlipidemia    CKD stage 2 due to type 2 diabetes mellitus     Type 2 diabetes mellitus with stage 2 chronic kidney disease, without long-term current use of insulin     Relevant Orders    Comprehensive metabolic panel    Hemoglobin A1C    Benign essential hypertension     Other Visit Diagnoses       COVID-19    -  Primary    Relevant Medications    nirmatrelvir & ritonavir (Paxlovid, 300/100,) tablet therapy pack          We discussed the use of Paxlovid, dosing, duration, timing, side effects, interactions, and indications. Common side effects of bad taste, diarrhea, high bp and myalgias were discussed. Also discussed use of OTC options for symptomatic care and reasons for f/u or ER evaluation. Pt is agreeable to use. CDC quarantine guidelines were discussed/advised. ER records reviewed    F/u if no better    A1c in December due, DM not controlled with A1c 9.1       Reason for visit is   Chief Complaint   Patient presents with    Virtual Brief Visit    Virtual Regular Visit          Encounter provider Erich Pantoja DO    Provider located at [address] [place] [address]  [address] [place] [id number]      Recent Visits  Date Type Provider Dept   11/20/23 Calvin Hugo recent visits within past 7 days and meeting all other requirements  Future Appointments  No visits were found meeting these conditions. Showing future appointments within next 150 days and meeting all other requirements       The patient was identified by name and date of birth. Dionte Medina was informed that this is a telemedicine visit and that the visit is being conducted through the [employer]. He agrees to proceed. .  My office door was closed. No one else was in the room. He acknowledged consent and understanding of privacy and security of the video platform. The patient has agreed to participate and understands they can discontinue the visit at any time. Patient is aware this is a billable service. Cory Connolly is a 68 y.o. male covid pos . HPI     Sister Tank Dunbar was in room and also served as historian and intermediary to his symptoms due to underlying poor historian status, which  is well established for this patient.     Today is day 3 of symptoms  Runny nose  Severe cough  Chest congestion  No fever  Feels sob  Using inhalers  Eating/drinking some  Decreased appetite  Mucinex DM and benzonatate  CXR reported normal  Covid tests pos  Flu neg   No fever    Past Medical History:   Diagnosis Date    Arthritis     Chest pain 02/01/2006    Chronic pain     lower back    Closed fracture of one rib 07/27/2007    Colon polyp     COPD (chronic obstructive pulmonary disease) (HCC)     Diabetes mellitus (HCC)     boarderline diabetes    Diverticulosis     Environmental allergies     GERD (gastroesophageal reflux disease)     Hiatal hernia     [place] (hard of hearing)     uses hearing aides    Hyperlipidemia 01/05/2015    Hypertension     Migraine     Sebaceous cyst 01/11/2016       Past Surgical History:   Procedure Laterality Date    COLONOSCOPY      EGD AND COLONOSCOPY      WISDOM TOOTH EXTRACTION         Current Outpatient Medications   Medication Sig Dispense Refill    nirmatrelvir & ritonavir (Paxlovid, 300/100,) tablet therapy pack Take 3 tablets by mouth 2 (two) times a day for 5 days Take 2 nirmatrelvir tablets + 1 ritonavir tablet together per dose 30 tablet 0    acetaminophen (TYLENOL) 650 mg CR tablet Take 650 mg by mouth as needed daily      Ascorbic Acid (VITAMIN C) 500 MG CAPS Take by mouth 2 (two) times a day       aspirin (ECOTRIN LOW STRENGTH) 81 mg EC tablet Take 81 mg by mouth if needed      atorvastatin (LIPITOR) 10 mg tablet TAKE 1 TABLET BY MOUTH EVERY DAY 90 tablet 1    Cyanocobalamin (VITAMIN B 12 PO) Take by mouth      dulaglutide (Trulicity) 3 WD/6.5RF injection Inject 0.5 mL (3 mg total) under the skin every 7 days 6 mL 1    fluticasone (FLONASE) 50 mcg/act nasal spray 1 spray into each nostril daily      gabapentin (NEURONTIN) 300 mg capsule Take 1 capsule (300 mg total) by mouth 2 (two) times a day 180 capsule 1    Magnesium Oxide 400 MG CAPS Take 400 mg by mouth 2 (two) times a day       montelukast (SINGULAIR) 10 mg tablet TAKE 1 TABLET (10 MG TOTAL) BY MOUTH DAILY WITH BREAKFAST 90 tablet 1    Multiple Vitamins-Minerals (PRESERVISION AREDS PO) Take by mouth daily      ofloxacin (OCUFLOX) 0.3 % ophthalmic solution LOCATION: RIGHT EYE. APPLY ONE DROP IN RIGHT EYE 4 TIMES A DAY START MONDAY BEFORE SURGERY. omega-3-acid ethyl esters (LOVAZA) 1 g capsule TAKE 2 CAPSULES BY MOUTH TWICE A  capsule 1    omeprazole (PriLOSEC OTC) 20 MG tablet Take 20 mg by mouth daily      prednisoLONE acetate (PRED FORTE) 1 % ophthalmic suspension LOCATION: RIGHT EYE. INSTILL ONE DROP IN RIGHT EYE 4 TIMES A DAY START MONDAY BEFORE SURGERY. Prolensa 0.07 % SOLN LOCATION: RIGHT EYE. APPLY ONE DROP IN RIGHT EYE EVERY MORNING STARTING MONDAY BEFORE SURGERY      propranolol (INDERAL LA) 160 mg Take 1 capsule (160 mg total) by mouth daily 90 capsule 3    ramipril (ALTACE) 10 MG capsule TAKE 2 CAPSULES BY MOUTH EVERY  capsule 1    SUMAtriptan (IMITREX) 100 mg tablet Take 1 tablet (100 mg total) by mouth once as needed for migraine for up to 1 dose 9 tablet 1    Symbicort 160-4.5 MCG/ACT inhaler INHALE 2 PUFFS BY MOUTH 2 TIMES A DAY RINSE MOUTH AFTER USE. 10.2 g 3    verapamil (CALAN-SR) 240 mg CR tablet Take 1 tablet (240 mg total) by mouth daily at bedtime 90 tablet 1     No current facility-administered medications for this visit.         Allergies   Allergen Reactions Other Sneezing     SEASONAL   Other reaction(s): Sneezing  SEASONAL        Review of Systems   Respiratory:  Positive for cough and shortness of breath. Video Exam    There were no vitals filed for this visit. Physical Exam     It was my intent to perform this visit via video technology but the patient was not able to do a video connection so the visit was completed via audio telephone only.     Visit Time  Total Visit Duration: 26

## 2023-11-25 DIAGNOSIS — I10 BENIGN ESSENTIAL HYPERTENSION: ICD-10-CM

## 2023-11-25 DIAGNOSIS — G43.009 MIGRAINE WITHOUT AURA AND WITHOUT STATUS MIGRAINOSUS, NOT INTRACTABLE: ICD-10-CM

## 2023-11-25 DIAGNOSIS — E78.1 HIGH TRIGLYCERIDES: ICD-10-CM

## 2023-11-27 RX ORDER — OMEGA-3-ACID ETHYL ESTERS 1 G/1
CAPSULE, LIQUID FILLED ORAL
Qty: 360 CAPSULE | Refills: 1 | Status: SHIPPED | OUTPATIENT
Start: 2023-11-27

## 2023-11-27 RX ORDER — RAMIPRIL 10 MG/1
CAPSULE ORAL
Qty: 180 CAPSULE | Refills: 1 | Status: SHIPPED | OUTPATIENT
Start: 2023-11-27

## 2023-11-27 RX ORDER — PROPRANOLOL HYDROCHLORIDE 160 MG/1
160 CAPSULE, EXTENDED RELEASE ORAL DAILY
Qty: 90 CAPSULE | Refills: 3 | Status: SHIPPED | OUTPATIENT
Start: 2023-11-27

## 2023-12-13 ENCOUNTER — TELEPHONE (OUTPATIENT)
Dept: NEUROLOGY | Facility: CLINIC | Age: 73
End: 2023-12-13

## 2023-12-16 LAB
ALBUMIN SERPL-MCNC: 4.1 G/DL (ref 3.8–4.8)
ALBUMIN/GLOB SERPL: 1.8 {RATIO} (ref 1.2–2.2)
ALP SERPL-CCNC: 191 IU/L (ref 44–121)
ALT SERPL-CCNC: 91 IU/L (ref 0–44)
AST SERPL-CCNC: 63 IU/L (ref 0–40)
BILIRUB SERPL-MCNC: 0.4 MG/DL (ref 0–1.2)
BUN SERPL-MCNC: 26 MG/DL (ref 8–27)
BUN/CREAT SERPL: 22 (ref 10–24)
CALCIUM SERPL-MCNC: 9.1 MG/DL (ref 8.6–10.2)
CHLORIDE SERPL-SCNC: 101 MMOL/L (ref 96–106)
CO2 SERPL-SCNC: 20 MMOL/L (ref 20–29)
CREAT SERPL-MCNC: 1.19 MG/DL (ref 0.76–1.27)
EGFR: 65 ML/MIN/1.73
GLOBULIN SER-MCNC: 2.3 G/DL (ref 1.5–4.5)
GLUCOSE SERPL-MCNC: 246 MG/DL (ref 70–99)
HBA1C MFR BLD: 9 % (ref 4.8–5.6)
POTASSIUM SERPL-SCNC: 5 MMOL/L (ref 3.5–5.2)
PROT SERPL-MCNC: 6.4 G/DL (ref 6–8.5)
SODIUM SERPL-SCNC: 138 MMOL/L (ref 134–144)

## 2023-12-18 RX ORDER — ALBUTEROL SULFATE 90 UG/1
AEROSOL, METERED RESPIRATORY (INHALATION)
COMMUNITY
Start: 2023-11-19

## 2023-12-18 RX ORDER — BENZONATATE 200 MG/1
CAPSULE ORAL
COMMUNITY
Start: 2023-11-19

## 2023-12-19 ENCOUNTER — OFFICE VISIT (OUTPATIENT)
Dept: OTOLARYNGOLOGY | Facility: CLINIC | Age: 73
End: 2023-12-19
Payer: COMMERCIAL

## 2023-12-19 VITALS — WEIGHT: 185 LBS | HEIGHT: 62 IN | BODY MASS INDEX: 34.04 KG/M2 | TEMPERATURE: 97.6 F

## 2023-12-19 DIAGNOSIS — H61.23 BILATERAL IMPACTED CERUMEN: Primary | ICD-10-CM

## 2023-12-19 DIAGNOSIS — H90.3 SENSORINEURAL HEARING LOSS (SNHL), BILATERAL: ICD-10-CM

## 2023-12-19 DIAGNOSIS — G43.009 MIGRAINE WITHOUT AURA AND WITHOUT STATUS MIGRAINOSUS, NOT INTRACTABLE: ICD-10-CM

## 2023-12-19 PROCEDURE — 69210 REMOVE IMPACTED EAR WAX UNI: CPT | Performed by: NURSE PRACTITIONER

## 2023-12-19 PROCEDURE — 99213 OFFICE O/P EST LOW 20 MIN: CPT | Performed by: NURSE PRACTITIONER

## 2023-12-19 RX ORDER — VERAPAMIL HYDROCHLORIDE 240 MG/1
240 TABLET, FILM COATED, EXTENDED RELEASE ORAL
Qty: 90 TABLET | Refills: 1 | Status: SHIPPED | OUTPATIENT
Start: 2023-12-19

## 2023-12-19 NOTE — PROGRESS NOTES
Assessment/Plan:    Bilateral impacted cerumen  On exam noted bilateral cerumen impaction and unable to fully view tympanic membrane.  Cerumen impaction removed bilateral eac with irrigation and #5 suction, pt tolerated procedure well.  Upon removal, improved hearing and decreased clogged sensation of bilateral ears.  Discussed routine cerumen care including avoidance of q-tips and cerumen softeners.  Encourage ongoing follow up annually to monitor for cerumen and hearing.  Audiogram if symptoms worsen.           Sensorineural hearing loss (SNHL), bilateral  Continue binaural hearing amplification.  Audiogram prn hearing worsening       Diagnoses and all orders for this visit:    Bilateral impacted cerumen  -     Ear cerumen removal    Sensorineural hearing loss (SNHL), bilateral    Other orders  -     albuterol (PROVENTIL HFA,VENTOLIN HFA) 90 mcg/act inhaler; INHALE 1-2 PUFFS BY MOUTH EVERY 4 HOURS AS NEEDED NEEDS ANNUAL APPT FOR REFILLS  -     benzonatate (TESSALON) 200 MG capsule; TAKE 1 CAPSULE BY MOUTH THREE TIMES A DAY AS NEEDED FOR COUGH          Subjective:      Patient ID: Larry Juárez is a 73 y.o. male.    Presents today for follow up due to bilateral hearing loss.  Hearing gradually worsening.  Bilateral hearing aids.  During routine check for aids informed of wax blocking ears.  No otalgia.  No otorrhea.  Here with caregiver/sister          The following portions of the patient's history were reviewed and updated as appropriate: allergies, current medications, past family history, past medical history, past social history, past surgical history, and problem list.    Review of Systems   Constitutional: Negative.    HENT:  Positive for hearing loss. Negative for congestion, ear discharge, ear pain, nosebleeds, postnasal drip, rhinorrhea, sinus pressure, sinus pain, sore throat, tinnitus and voice change.    Respiratory:  Negative for chest tightness and shortness of breath.    Skin:  Negative for color  "change.   Neurological:  Negative for dizziness, numbness and headaches.   Psychiatric/Behavioral: Negative.           Objective:      Temp 97.6 °F (36.4 °C) (Temporal)   Ht 5' 1.5\" (1.562 m)   Wt 83.9 kg (185 lb)   BMI 34.39 kg/m²          Physical Exam  Constitutional:       Appearance: He is well-developed.   HENT:      Head: Normocephalic.      Right Ear: Hearing, tympanic membrane, ear canal and external ear normal. No decreased hearing noted. No drainage or tenderness. There is impacted cerumen. Tympanic membrane is not perforated or erythematous.      Left Ear: Hearing, tympanic membrane, ear canal and external ear normal. No decreased hearing noted. No drainage or tenderness. There is impacted cerumen. Tympanic membrane is not perforated or erythematous.      Nose: Nose normal. No nasal deformity or septal deviation.      Mouth/Throat:      Mouth: Mucous membranes are not pale and not dry. No oral lesions.      Dentition: Normal dentition.      Pharynx: Uvula midline. No oropharyngeal exudate.   Neck:      Trachea: No tracheal deviation.   Pulmonary:      Effort: No accessory muscle usage or respiratory distress.   Musculoskeletal:      Cervical back: Neck supple.   Lymphadenopathy:      Cervical: No cervical adenopathy.   Skin:     General: Skin is warm and dry.   Neurological:      Mental Status: He is alert and oriented to person, place, and time.      Cranial Nerves: No cranial nerve deficit.      Sensory: No sensory deficit.   Psychiatric:         Behavior: Behavior is cooperative.         Ear cerumen removal    Date/Time: 12/19/2023 2:30 PM    Performed by: YARELY Blackwell  Authorized by: YARELY Blackwell  Universal Protocol:  Consent: Verbal consent obtained.  Risks and benefits: risks, benefits and alternatives were discussed  Consent given by: patient  Patient understanding: patient states understanding of the procedure being performed    Patient location:  Clinic  Procedure details:     Local " anesthetic:  None    Location:  L ear and R ear    Approach:  External  Post-procedure details:     Complication:  None    Hearing quality:  Normal    Patient tolerance of procedure:  Tolerated well, no immediate complications

## 2023-12-19 NOTE — ASSESSMENT & PLAN NOTE
On exam noted bilateral cerumen impaction and unable to fully view tympanic membrane.  Cerumen impaction removed bilateral eac with irrigation and #5 suction, pt tolerated procedure well.  Upon removal, improved hearing and decreased clogged sensation of bilateral ears.  Discussed routine cerumen care including avoidance of q-tips and cerumen softeners.  Encourage ongoing follow up annually to monitor for cerumen and hearing.  Audiogram if symptoms worsen.           Sensorineural hearing loss (SNHL), bilateral  Continue binaural hearing amplification.  Audiogram prn hearing worsening

## 2023-12-21 DIAGNOSIS — M54.50 CHRONIC BILATERAL LOW BACK PAIN WITHOUT SCIATICA: ICD-10-CM

## 2023-12-21 DIAGNOSIS — G89.29 CHRONIC BILATERAL LOW BACK PAIN WITHOUT SCIATICA: ICD-10-CM

## 2023-12-21 RX ORDER — GABAPENTIN 300 MG/1
300 CAPSULE ORAL 2 TIMES DAILY
Qty: 180 CAPSULE | Refills: 1 | Status: SHIPPED | OUTPATIENT
Start: 2023-12-21

## 2023-12-25 ENCOUNTER — TELEPHONE (OUTPATIENT)
Dept: FAMILY MEDICINE CLINIC | Facility: CLINIC | Age: 73
End: 2023-12-25

## 2023-12-26 DIAGNOSIS — G43.009 MIGRAINE WITHOUT AURA AND WITHOUT STATUS MIGRAINOSUS, NOT INTRACTABLE: ICD-10-CM

## 2023-12-26 RX ORDER — SUMATRIPTAN 100 MG/1
100 TABLET, FILM COATED ORAL ONCE AS NEEDED
Qty: 9 TABLET | Refills: 1 | Status: SHIPPED | OUTPATIENT
Start: 2023-12-26

## 2023-12-26 NOTE — TELEPHONE ENCOUNTER
"Scheduled for 12/26/23 \"wellness check\" for 15minutes  Needs 30 minutes  Make reason for visit Hypertension  Keep as 30 minutes please  "

## 2023-12-26 NOTE — TELEPHONE ENCOUNTER
LMOM informing the patient that the appointment on 12/26/23 has been canceled due to the provider not being in the office (provider is out sick) and to contact the office to reschedule.

## 2023-12-31 PROBLEM — R06.02 SOB (SHORTNESS OF BREATH): Status: RESOLVED | Noted: 2019-06-03 | Resolved: 2023-12-31

## 2023-12-31 PROBLEM — K21.9 GASTROESOPHAGEAL REFLUX DISEASE: Status: RESOLVED | Noted: 2021-06-04 | Resolved: 2023-12-31

## 2024-01-02 ENCOUNTER — OFFICE VISIT (OUTPATIENT)
Dept: FAMILY MEDICINE CLINIC | Facility: CLINIC | Age: 74
End: 2024-01-02
Payer: COMMERCIAL

## 2024-01-02 VITALS
TEMPERATURE: 97 F | SYSTOLIC BLOOD PRESSURE: 134 MMHG | HEIGHT: 62 IN | RESPIRATION RATE: 18 BRPM | DIASTOLIC BLOOD PRESSURE: 80 MMHG | WEIGHT: 181 LBS | OXYGEN SATURATION: 98 % | BODY MASS INDEX: 33.31 KG/M2 | HEART RATE: 60 BPM

## 2024-01-02 DIAGNOSIS — N18.2 TYPE 2 DIABETES MELLITUS WITH STAGE 2 CHRONIC KIDNEY DISEASE, WITHOUT LONG-TERM CURRENT USE OF INSULIN: ICD-10-CM

## 2024-01-02 DIAGNOSIS — Z00.00 MEDICARE ANNUAL WELLNESS VISIT, SUBSEQUENT: Primary | ICD-10-CM

## 2024-01-02 DIAGNOSIS — E66.01 CLASS 2 SEVERE OBESITY DUE TO EXCESS CALORIES WITH SERIOUS COMORBIDITY AND BODY MASS INDEX (BMI) OF 35.0 TO 35.9 IN ADULT: ICD-10-CM

## 2024-01-02 DIAGNOSIS — Z12.5 PROSTATE CANCER SCREENING: ICD-10-CM

## 2024-01-02 DIAGNOSIS — Z12.11 COLON CANCER SCREENING: ICD-10-CM

## 2024-01-02 DIAGNOSIS — Z86.010 PERSONAL HISTORY OF COLONIC POLYPS: ICD-10-CM

## 2024-01-02 DIAGNOSIS — E78.49 OTHER HYPERLIPIDEMIA: ICD-10-CM

## 2024-01-02 DIAGNOSIS — E11.22 TYPE 2 DIABETES MELLITUS WITH STAGE 2 CHRONIC KIDNEY DISEASE, WITHOUT LONG-TERM CURRENT USE OF INSULIN: ICD-10-CM

## 2024-01-02 PROCEDURE — G0439 PPPS, SUBSEQ VISIT: HCPCS | Performed by: FAMILY MEDICINE

## 2024-01-02 PROCEDURE — 99214 OFFICE O/P EST MOD 30 MIN: CPT | Performed by: FAMILY MEDICINE

## 2024-01-02 RX ORDER — METFORMIN HYDROCHLORIDE 500 MG/1
500 TABLET, EXTENDED RELEASE ORAL
Qty: 90 TABLET | Refills: 1 | Status: SHIPPED | OUTPATIENT
Start: 2024-01-02

## 2024-01-02 NOTE — PROGRESS NOTES
Assessment/Plan:    No problem-specific Assessment & Plan notes found for this encounter.    DM2 uncontrolled  Work on diet if possible   Increase trulicity 3mg to 4.5mg/w  F/u 3m    Bmi aware  Slight wt loss noted    HLD stable on statin lipitor 10mg    Htn stable on meds  Try to exercise more when possible  Has local and indoor options      Diagnoses and all orders for this visit:    Medicare annual wellness visit, subsequent    Colon cancer screening    Personal history of colonic polyps    Prostate cancer screening  -     PSA, Total Screen; Future    Other hyperlipidemia  -     Lipid Panel with Direct LDL reflex; Future    Type 2 diabetes mellitus with stage 2 chronic kidney disease, without long-term current use of insulin   -     Albumin / creatinine urine ratio; Future  -     dulaglutide (Trulicity) 4.5 MG/0.5ML injection; Inject 0.5 mL (4.5 mg total) under the skin every 7 days  -     metFORMIN (GLUCOPHAGE-XR) 500 mg 24 hr tablet; Take 1 tablet (500 mg total) by mouth daily with dinner    Class 2 severe obesity due to excess calories with serious comorbidity and body mass index (BMI) of 35.0 to 35.9 in adult         Return in about 13 weeks (around 4/2/2024) for Recheck.    Subjective:      Patient ID: Larry Juárez is a 73 y.o. male.    Chief Complaint   Patient presents with    Follow-up     Lab work  [id number]       HPI  Diet has been tough  Diet not great  Some wt loss  Still has sweets and bread  Donuts  Still goes to [facility], 2x/w  Walks on track, weights  Fbs 246  Never been on metformin  A1c 9.0  No c/d    The following portions of the patient's history were reviewed and updated as appropriate: allergies, current medications, past family history, past medical history, past social history, past surgical history and problem list.    Review of Systems   Constitutional:  Negative for chills and fever.   Gastrointestinal:  Negative for diarrhea, nausea and vomiting.         Current Outpatient Medications    Medication Sig Dispense Refill    acetaminophen (TYLENOL) 650 mg CR tablet Take 650 mg by mouth as needed daily      albuterol (PROVENTIL HFA,VENTOLIN HFA) 90 mcg/act inhaler INHALE 1-2 PUFFS BY MOUTH EVERY 4 HOURS AS NEEDED NEEDS ANNUAL APPT FOR REFILLS      Ascorbic Acid (VITAMIN C) 500 MG CAPS Take by mouth 2 (two) times a day       aspirin (ECOTRIN LOW STRENGTH) 81 mg EC tablet Take 81 mg by mouth if needed      atorvastatin (LIPITOR) 10 mg tablet TAKE 1 TABLET BY MOUTH EVERY DAY 90 tablet 1    Cyanocobalamin (VITAMIN B 12 PO) Take by mouth      dulaglutide (Trulicity) 4.5 MG/0.5ML injection Inject 0.5 mL (4.5 mg total) under the skin every 7 days 6 mL 1    fluticasone (FLONASE) 50 mcg/act nasal spray 1 spray into each nostril daily      gabapentin (NEURONTIN) 300 mg capsule Take 1 capsule (300 mg total) by mouth 2 (two) times a day 180 capsule 1    Magnesium Oxide 400 MG CAPS Take 400 mg by mouth 2 (two) times a day       metFORMIN (GLUCOPHAGE-XR) 500 mg 24 hr tablet Take 1 tablet (500 mg total) by mouth daily with dinner 90 tablet 1    montelukast (SINGULAIR) 10 mg tablet TAKE 1 TABLET (10 MG TOTAL) BY MOUTH DAILY WITH BREAKFAST 90 tablet 1    Multiple Vitamins-Minerals (PRESERVISION AREDS PO) Take by mouth daily      omega-3-acid ethyl esters (LOVAZA) 1 g capsule TAKE 2 CAPSULES BY MOUTH TWICE A  capsule 1    omeprazole (PriLOSEC OTC) 20 MG tablet Take 20 mg by mouth daily      propranolol (INDERAL LA) 160 mg TAKE 1 CAPSULE BY MOUTH DAILY. 90 capsule 3    ramipril (ALTACE) 10 MG capsule TAKE 2 CAPSULES BY MOUTH EVERY  capsule 1    SUMAtriptan (IMITREX) 100 mg tablet Take 1 tablet (100 mg total) by mouth once as needed for migraine 9 tablet 1    Symbicort 160-4.5 MCG/ACT inhaler INHALE 2 PUFFS BY MOUTH 2 TIMES A DAY RINSE MOUTH AFTER USE. 10.2 g 3    verapamil (CALAN-SR) 240 mg CR tablet TAKE 1 TABLET (240 MG TOTAL) BY MOUTH DAILY AT BEDTIME 90 tablet 1    Prolensa 0.07 % SOLN LOCATION: RIGHT  "EYE. APPLY ONE DROP IN RIGHT EYE EVERY MORNING STARTING MONDAY BEFORE SURGERY (Patient not taking: Reported on 1/2/2024)       No current facility-administered medications for this visit.       Objective:    /80   Pulse 60   Temp (!) 97 °F (36.1 °C)   Resp 18   Ht 5' 1.5\" (1.562 m)   Wt 82.1 kg (181 lb)   SpO2 98%   BMI 33.65 kg/m²        Physical Exam  Vitals and nursing note reviewed.   Constitutional:       General: He is not in acute distress.     Appearance: He is well-developed. He is obese. He is not ill-appearing.   HENT:      Head: Normocephalic.      Left Ear: Tympanic membrane normal.      Nose: No congestion.      Mouth/Throat:      Pharynx: No oropharyngeal exudate.   Eyes:      General: No scleral icterus.     Conjunctiva/sclera: Conjunctivae normal.   Cardiovascular:      Rate and Rhythm: Normal rate and regular rhythm.   Pulmonary:      Effort: Pulmonary effort is normal. No respiratory distress.      Breath sounds: No wheezing.   Abdominal:      Palpations: Abdomen is soft.   Musculoskeletal:         General: No deformity.      Cervical back: Neck supple.      Right lower leg: No edema.      Left lower leg: No edema.   Skin:     General: Skin is warm and dry.      Coloration: Skin is not jaundiced or pale.   Neurological:      Mental Status: He is alert.      Motor: No weakness.      Gait: Gait normal.   Psychiatric:         Mood and Affect: Mood normal.         Behavior: Behavior normal.         Thought Content: Thought content normal.                Arley Ramirez, DO    "

## 2024-01-02 NOTE — PATIENT INSTRUCTIONS
We should increase the trulicity from 3mg/week to the max of 4.5mg/week and start when you run out of the 3mg BUT also add on metformin 500mg ER once a day starting now.  We could increase the metformin to 1000mg per day in one month if gastrointestinally tolerated.  Medicare Preventive Visit Patient Instructions  Thank you for completing your Welcome to Medicare Visit or Medicare Annual Wellness Visit today. Your next wellness visit will be due in one year (1/3/2025).  The screening/preventive services that you may require over the next 5-10 years are detailed below. Some tests may not apply to you based off risk factors and/or age. Screening tests ordered at today's visit but not completed yet may show as past due. Also, please note that scanned in results may not display below.  Preventive Screenings:  Service Recommendations Previous Testing/Comments   Colorectal Cancer Screening  Colonoscopy    Fecal Occult Blood Test (FOBT)/Fecal Immunochemical Test (FIT)  Fecal DNA/Cologuard Test  Flexible Sigmoidoscopy Age: 45-75 years old   Colonoscopy: every 10 years (May be performed more frequently if at higher risk)  OR  FOBT/FIT: every 1 year  OR  Cologuard: every 3 years  OR  Sigmoidoscopy: every 5 years  Screening may be recommended earlier than age 45 if at higher risk for colorectal cancer. Also, an individualized decision between you and your healthcare provider will decide whether screening between the ages of 76-85 would be appropriate. Colonoscopy: 09/22/2021  FOBT/FIT: Not on file  Cologuard: Not on file  Sigmoidoscopy: Not on file          Prostate Cancer Screening Individualized decision between patient and health care provider in men between ages of 55-69   Medicare will cover every 12 months beginning on the day after your 50th birthday PSA: 2.7 ng/mL           Hepatitis C Screening Once for adults born between 1945 and 1965  More frequently in patients at high risk for Hepatitis C Hep C Antibody: Not on  file        Diabetes Screening 1-2 times per year if you're at risk for diabetes or have pre-diabetes Fasting glucose: 205 mg/dL (9/12/2023)  A1C: 9.0 % (12/15/2023)      Cholesterol Screening Once every 5 years if you don't have a lipid disorder. May order more often based on risk factors. Lipid panel: 04/04/2023         Other Preventive Screenings Covered by Medicare:  Abdominal Aortic Aneurysm (AAA) Screening: covered once if your at risk. You're considered to be at risk if you have a family history of AAA or a male between the age of 65-75 who smoking at least 100 cigarettes in your lifetime.  Lung Cancer Screening: covers low dose CT scan once per year if you meet all of the following conditions: (1) Age 55-77; (2) No signs or symptoms of lung cancer; (3) Current smoker or have quit smoking within the last 15 years; (4) You have a tobacco smoking history of at least 20 pack years (packs per day x number of years you smoked); (5) You get a written order from a healthcare provider.  Glaucoma Screening: covered annually if you're considered high risk: (1) You have diabetes OR (2) Family history of glaucoma OR (3)  aged 50 and older OR (4)  American aged 65 and older  Osteoporosis Screening: covered every 2 years if you meet one of the following conditions: (1) Have a vertebral abnormality; (2) On glucocorticoid therapy for more than 3 months; (3) Have primary hyperparathyroidism; (4) On osteoporosis medications and need to assess response to drug therapy.  HIV Screening: covered annually if you're between the age of 15-65. Also covered annually if you are younger than 15 and older than 65 with risk factors for HIV infection. For pregnant patients, it is covered up to 3 times per pregnancy.    Immunizations:  Immunization Recommendations   Influenza Vaccine Annual influenza vaccination during flu season is recommended for all persons aged >= 6 months who do not have contraindications    Pneumococcal Vaccine   * Pneumococcal conjugate vaccine = PCV13 (Prevnar 13), PCV15 (Vaxneuvance), PCV20 (Prevnar 20)  * Pneumococcal polysaccharide vaccine = PPSV23 (Pneumovax) Adults 19-65 yo with certain risk factors or if 65+ yo  If never received any pneumonia vaccine: recommend Prevnar 20 (PCV20)  Give PCV20 if previously received 1 dose of PCV13 or PPSV23   Hepatitis B Vaccine 3 dose series if at intermediate or high risk (ex: diabetes, end stage renal disease, liver disease)   Respiratory syncytial virus (RSV) Vaccine - COVERED BY MEDICARE PART D  * RSVPreF3 (Arexvy) CDC recommends that adults 60 years of age and older may receive a single dose of RSV vaccine using shared clinical decision-making (SCDM)   Tetanus (Td) Vaccine - COST NOT COVERED BY MEDICARE PART B Following completion of primary series, a booster dose should be given every 10 years to maintain immunity against tetanus. Td may also be given as tetanus wound prophylaxis.   Tdap Vaccine - COST NOT COVERED BY MEDICARE PART B Recommended at least once for all adults. For pregnant patients, recommended with each pregnancy.   Shingles Vaccine (Shingrix) - COST NOT COVERED BY MEDICARE PART B  2 shot series recommended in those 19 years and older who have or will have weakened immune systems or those 50 years and older     Health Maintenance Due:      Topic Date Due   • Hepatitis C Screening  Never done   • Colorectal Cancer Screening  03/21/2022     Immunizations Due:      Topic Date Due   • Pneumococcal Vaccine: 65+ Years (1 - PCV) Never done   • Influenza Vaccine (1) Never done   • COVID-19 Vaccine (4 - 2023-24 season) 09/01/2023     Advance Directives   What are advance directives?  Advance directives are legal documents that state your wishes and plans for medical care. These plans are made ahead of time in case you lose your ability to make decisions for yourself. Advance directives can apply to any medical decision, such as the treatments you  want, and if you want to donate organs.   What are the types of advance directives?  There are many types of advance directives, and each state has rules about how to use them. You may choose a combination of any of the following:  Living will:  This is a written record of the treatment you want. You can also choose which treatments you do not want, which to limit, and which to stop at a certain time. This includes surgery, medicine, IV fluid, and tube feedings.   Durable power of  for healthcare (DPAHC):  This is a written record that states who you want to make healthcare choices for you when you are unable to make them for yourself. This person, called a proxy, is usually a family member or a friend. You may choose more than 1 proxy.  Do not resuscitate (DNR) order:  A DNR order is used in case your heart stops beating or you stop breathing. It is a request not to have certain forms of treatment, such as CPR. A DNR order may be included in other types of advance directives.  Medical directive:  This covers the care that you want if you are in a coma, near death, or unable to make decisions for yourself. You can list the treatments you want for each condition. Treatment may include pain medicine, surgery, blood transfusions, dialysis, IV or tube feedings, and a ventilator (breathing machine).  Values history:  This document has questions about your views, beliefs, and how you feel and think about life. This information can help others choose the care that you would choose.  Why are advance directives important?  An advance directive helps you control your care. Although spoken wishes may be used, it is better to have your wishes written down. Spoken wishes can be misunderstood, or not followed. Treatments may be given even if you do not want them. An advance directive may make it easier for your family to make difficult choices about your care.   Weight Management   Why it is important to manage your  weight:  Being overweight increases your risk of health conditions such as heart disease, high blood pressure, type 2 diabetes, and certain types of cancer. It can also increase your risk for osteoarthritis, sleep apnea, and other respiratory problems. Aim for a slow, steady weight loss. Even a small amount of weight loss can lower your risk of health problems.  How to lose weight safely:  A safe and healthy way to lose weight is to eat fewer calories and get regular exercise. You can lose up about 1 pound a week by decreasing the number of calories you eat by 500 calories each day.   Healthy meal plan for weight management:  A healthy meal plan includes a variety of foods, contains fewer calories, and helps you stay healthy. A healthy meal plan includes the following:  Eat whole-grain foods more often.  A healthy meal plan should contain fiber. Fiber is the part of grains, fruits, and vegetables that is not broken down by your body. Whole-grain foods are healthy and provide extra fiber in your diet. Some examples of whole-grain foods are whole-wheat breads and pastas, oatmeal, brown rice, and bulgur.  Eat a variety of vegetables every day.  Include dark, leafy greens such as spinach, kale, sergey greens, and mustard greens. Eat yellow and orange vegetables such as carrots, sweet potatoes, and winter squash.   Eat a variety of fruits every day.  Choose fresh or canned fruit (canned in its own juice or light syrup) instead of juice. Fruit juice has very little or no fiber.  Eat low-fat dairy foods.  Drink fat-free (skim) milk or 1% milk. Eat fat-free yogurt and low-fat cottage cheese. Try low-fat cheeses such as mozzarella and other reduced-fat cheeses.  Choose meat and other protein foods that are low in fat.  Choose beans or other legumes such as split peas or lentils. Choose fish, skinless poultry (chicken or turkey), or lean cuts of red meat (beef or pork). Before you cook meat or poultry, cut off any visible  fat.   Use less fat and oil.  Try baking foods instead of frying them. Add less fat, such as margarine, sour cream, regular salad dressing and mayonnaise to foods. Eat fewer high-fat foods. Some examples of high-fat foods include french fries, doughnuts, ice cream, and cakes.  Eat fewer sweets.  Limit foods and drinks that are high in sugar. This includes candy, cookies, regular soda, and sweetened drinks.  Exercise:  Exercise at least 30 minutes per day on most days of the week. Some examples of exercise include walking, biking, dancing, and swimming. You can also fit in more physical activity by taking the stairs instead of the elevator or parking farther away from stores. Ask your healthcare provider about the best exercise plan for you.      © Copyright [employer] 2018 Information is for End User's use only and may not be sold, redistributed or otherwise used for commercial purposes. All illustrations and images included in CareNotes® are the copyrighted property of [employer] Inc[employer] or [employer]

## 2024-01-03 PROBLEM — Z12.11 COLON CANCER SCREENING: Status: ACTIVE | Noted: 2024-01-03

## 2024-01-03 NOTE — PROGRESS NOTES
Assessment and Plan:     Problem List Items Addressed This Visit          Active Problems    Colon cancer screening    Other hyperlipidemia    Relevant Orders    Lipid Panel with Direct LDL reflex    Type 2 diabetes mellitus with stage 2 chronic kidney disease, without long-term current use of insulin     Relevant Medications    dulaglutide (Trulicity) 4.5 MG/0.5ML injection    metFORMIN (GLUCOPHAGE-XR) 500 mg 24 hr tablet    Other Relevant Orders    Albumin / creatinine urine ratio    Personal history of colonic polyps    Class 2 severe obesity due to excess calories with serious comorbidity and body mass index (BMI) of 35.0 to 35.9 in adult     Prostate cancer screening    Relevant Orders    PSA, Total Screen    Medicare annual wellness visit, subsequent - Primary        Preventive health issues were discussed with patient, and age appropriate screening tests were ordered as noted in patient's After Visit Summary.  Personalized health advice and appropriate referrals for health education or preventive services given if needed, as noted in patient's After Visit Summary.     History of Present Illness:     Patient presents for a Medicare Wellness Visit    HPI   Patient Care Team:  Arley Ramirez DO as PCP - General  Arley Ramirez DO as PCP - [facility]  MD Lcu Bartes MD Manoj Mittal, MD Robert Friedman, MD Lucien Bautista, DO     Review of Systems:     Review of Systems     Problem List:     Patient Active Problem List   Diagnosis    Chronic rhinitis    Benign essential hypertension    Benign prostatic hyperplasia    Cervical radiculopathy    Diverticulosis of large intestine without perforation or abscess without bleeding    Chronic GERD    Hiatal hernia    High triglycerides    Other hyperlipidemia    Inguinal hernia    Internal hemorrhoids    Migraine without aura and without status migrainosus, not intractable    Obesity (BMI 30-39.9)    Epidermoid cyst of skin     Situational anxiety    Spine disorder    Bilateral impacted cerumen    BMI 35.0-35.9,adult    Prostate cancer screening    Hand dermatitis    Medicare annual wellness visit, subsequent    LANE (dyspnea on exertion)    Left inguinal hernia    Class 2 severe obesity due to excess calories with serious comorbidity and body mass index (BMI) of 35.0 to 35.9 in adult     Fungal infection of skin of abdomen    Umbilical hernia with obstruction, without gangrene    Kidney disorder    Abnormal CT scan, lung    Tinea cruris    Personal history of colonic polyps    Sensorineural hearing loss (SNHL), bilateral    Chronic bilateral low back pain without sciatica    Immunization due    CKD stage 2 due to type 2 diabetes mellitus     Type 2 diabetes mellitus with stage 2 chronic kidney disease, without long-term current use of insulin     Colon cancer screening      Past Medical and Surgical History:     Past Medical History:   Diagnosis Date    Arthritis     Chest pain 02/01/2006    Chronic pain     lower back    Closed fracture of one rib 07/27/2007    Colon polyp     COPD (chronic obstructive pulmonary disease) (HCC)     Diabetes mellitus (HCC)     boarderline diabetes    Diverticulosis     Environmental allergies     GERD (gastroesophageal reflux disease)     Headache(784.0)     Hiatal hernia     HL (hearing loss)     [place] (hard of hearing)     uses hearing aides    Hyperlipidemia 01/05/2015    Hypertension     Migraine     Nasal congestion     Obesity     Sebaceous cyst 01/11/2016     Past Surgical History:   Procedure Laterality Date    COLONOSCOPY      EGD AND COLONOSCOPY      WISDOM TOOTH EXTRACTION        Family History:     Family History   Problem Relation Age of Onset    Arthritis Mother     COPD Mother     Hypertension Mother     Lung cancer Father     Asthma Brother     Cancer Brother         colon    Diabetes Sister     Hypertension Sister     Stomach cancer Brother     Asthma Brother       Social History:     Social  History     Socioeconomic History    Marital status: Single     Spouse name: None    Number of children: None    Years of education: None    Highest education level: None   Occupational History    None   Tobacco Use    Smoking status: Never     Passive exposure: Past    Smokeless tobacco: Never    Tobacco comments:     from infancy    Vaping Use    Vaping status: Never Used   Substance and Sexual Activity    Alcohol use: Yes     Alcohol/week: 2.0 standard drinks of alcohol     Types: 2 Glasses of wine per week     Comment: social    Drug use: No    Sexual activity: None   Other Topics Concern    None   Social History Narrative    None     Social Determinants of Health     Financial Resource Strain: Low Risk  (1/3/2024)    Overall Financial Resource Strain (CARDIA)     Difficulty of Paying Living Expenses: Not hard at all   Food Insecurity: Not on file   Transportation Needs: No Transportation Needs (1/3/2024)    PRAPARE - Transportation     Lack of Transportation (Medical): No     Lack of Transportation (Non-Medical): No   Physical Activity: Not on file   Stress: Not on file   Social Connections: Not on file   Intimate Partner Violence: Not on file   Housing Stability: Not on file      Medications and Allergies:     Current Outpatient Medications   Medication Sig Dispense Refill    acetaminophen (TYLENOL) 650 mg CR tablet Take 650 mg by mouth as needed daily      albuterol (PROVENTIL HFA,VENTOLIN HFA) 90 mcg/act inhaler INHALE 1-2 PUFFS BY MOUTH EVERY 4 HOURS AS NEEDED NEEDS ANNUAL APPT FOR REFILLS      Ascorbic Acid (VITAMIN C) 500 MG CAPS Take by mouth 2 (two) times a day       aspirin (ECOTRIN LOW STRENGTH) 81 mg EC tablet Take 81 mg by mouth if needed      atorvastatin (LIPITOR) 10 mg tablet TAKE 1 TABLET BY MOUTH EVERY DAY 90 tablet 1    Cyanocobalamin (VITAMIN B 12 PO) Take by mouth      dulaglutide (Trulicity) 4.5 MG/0.5ML injection Inject 0.5 mL (4.5 mg total) under the skin every 7 days 6 mL 1     fluticasone (FLONASE) 50 mcg/act nasal spray 1 spray into each nostril daily      gabapentin (NEURONTIN) 300 mg capsule Take 1 capsule (300 mg total) by mouth 2 (two) times a day 180 capsule 1    Magnesium Oxide 400 MG CAPS Take 400 mg by mouth 2 (two) times a day       metFORMIN (GLUCOPHAGE-XR) 500 mg 24 hr tablet Take 1 tablet (500 mg total) by mouth daily with dinner 90 tablet 1    montelukast (SINGULAIR) 10 mg tablet TAKE 1 TABLET (10 MG TOTAL) BY MOUTH DAILY WITH BREAKFAST 90 tablet 1    Multiple Vitamins-Minerals (PRESERVISION AREDS PO) Take by mouth daily      omega-3-acid ethyl esters (LOVAZA) 1 g capsule TAKE 2 CAPSULES BY MOUTH TWICE A  capsule 1    omeprazole (PriLOSEC OTC) 20 MG tablet Take 20 mg by mouth daily      propranolol (INDERAL LA) 160 mg TAKE 1 CAPSULE BY MOUTH DAILY. 90 capsule 3    ramipril (ALTACE) 10 MG capsule TAKE 2 CAPSULES BY MOUTH EVERY  capsule 1    SUMAtriptan (IMITREX) 100 mg tablet Take 1 tablet (100 mg total) by mouth once as needed for migraine 9 tablet 1    Symbicort 160-4.5 MCG/ACT inhaler INHALE 2 PUFFS BY MOUTH 2 TIMES A DAY RINSE MOUTH AFTER USE. 10.2 g 3    verapamil (CALAN-SR) 240 mg CR tablet TAKE 1 TABLET (240 MG TOTAL) BY MOUTH DAILY AT BEDTIME 90 tablet 1    Prolensa 0.07 % SOLN LOCATION: RIGHT EYE. APPLY ONE DROP IN RIGHT EYE EVERY MORNING STARTING MONDAY BEFORE SURGERY (Patient not taking: Reported on 1/2/2024)       No current facility-administered medications for this visit.     Allergies   Allergen Reactions    Other Sneezing     SEASONAL   Other reaction(s): Sneezing  SEASONAL       Immunizations:     Immunization History   Administered Date(s) Administered    COVID-19 MODERNA VACC 0.25 ML IM BOOSTER 11/23/2021    COVID-19 MODERNA VACC 0.5 ML IM 03/17/2021, 04/16/2021    Tuberculin Skin Test-PPD Intradermal 11/05/2019      Health Maintenance:         Topic Date Due    Hepatitis C Screening  Never done    Colorectal Cancer Screening  03/21/2022          Topic Date Due    Pneumococcal Vaccine: 65+ Years (1 - PCV) Never done    Influenza Vaccine (1) Never done    COVID-19 Vaccine (4 - 2023-24 season) 09/01/2023      Medicare Screening Tests and Risk Assessments:     Larry is here for his Subsequent Wellness visit. Last Medicare Wellness visit information reviewed, patient interviewed and updates made to the record today.      Health Risk Assessment:   Patient rates overall health as very good. Patient feels that their physical health rating is same. Patient is satisfied with their life. Eyesight was rated as same. Hearing was rated as same. Patient feels that their emotional and mental health rating is same. Patients states they are never, rarely angry. Patient states they are sometimes unusually tired/fatigued. Pain experienced in the last 7 days has been a lot. Patient's pain rating has been 8/10. Patient states that he has experienced no weight loss or gain in last 6 months.     Depression Screening:   PHQ-2 Score: 0      Fall Risk Screening:   In the past year, patient has experienced: no history of falling in past year      Home Safety:  Patient has trouble with stairs inside or outside of their home. Patient has working smoke alarms and has working carbon monoxide detector. Home safety hazards include: loose rugs on the floor.     Nutrition:   Current diet is Regular, Diabetic and Frequent junk food.     Medications:   Patient is currently taking over-the-counter supplements. OTC medications include: see medication list. Patient is able to manage medications.     Activities of Daily Living (ADLs)/Instrumental Activities of Daily Living (IADLs):   Walk and transfer into and out of bed and chair?: Yes  Dress and groom yourself?: Yes    Bathe or shower yourself?: Yes    Feed yourself? Yes  Do your laundry/housekeeping?: Yes  Manage your money, pay your bills and track your expenses?: Yes  Make your own meals?: Yes    Do your own shopping?: Yes    Previous  "Hospitalizations:   Any hospitalizations or ED visits within the last 12 months?: No      Advance Care Planning:   Living will: No    Durable POA for healthcare: Yes    Advanced directive: Yes    End of Life Decisions reviewed with patient: No      Cognitive Screening:   Provider or family/friend/caregiver concerned regarding cognition?: No    PREVENTIVE SCREENINGS      Cardiovascular Screening:    General: Screening Not Indicated and History Lipid Disorder      Diabetes Screening:     General: Screening Not Indicated and History Diabetes      Colorectal Cancer Screening:     General: Screening Current      Prostate Cancer Screening:    General: Screening Current      Osteoporosis Screening:    General: Screening Not Indicated      Abdominal Aortic Aneurysm (AAA) Screening:    Risk factors include: age between 65-74 yo and tobacco use        General: Screening Not Indicated      Lung Cancer Screening:     General: Screening Not Indicated      Hepatitis C Screening:    General: Screening Current    Hep C Screening Accepted: No     Screening, Brief Intervention, and Referral to Treatment (SBIRT)    Screening  Typical number of drinks in a day: 1  Typical number of drinks in a week: 1  Interpretation: Low risk drinking behavior.    Single Item Drug Screening:  How often have you used an illegal drug (including marijuana) or a prescription medication for non-medical reasons in the past year? never    Single Item Drug Screen Score: 0  Interpretation: Negative screen for possible drug use disorder    Other Counseling Topics:   Car/seat belt/driving safety and regular weightbearing exercise.     No results found.     Physical Exam:     /80   Pulse 60   Temp (!) 97 °F (36.1 °C)   Resp 18   Ht 5' 1.5\" (1.562 m)   Wt 82.1 kg (181 lb)   SpO2 98%   BMI 33.65 kg/m²     Physical Exam     Arley Ramirez, DO  "

## 2024-01-09 ENCOUNTER — TELEPHONE (OUTPATIENT)
Dept: NEUROLOGY | Facility: CLINIC | Age: 74
End: 2024-01-09

## 2024-01-23 ENCOUNTER — OFFICE VISIT (OUTPATIENT)
Dept: NEUROLOGY | Facility: CLINIC | Age: 74
End: 2024-01-23
Payer: COMMERCIAL

## 2024-01-23 VITALS
HEIGHT: 62 IN | SYSTOLIC BLOOD PRESSURE: 130 MMHG | BODY MASS INDEX: 33.86 KG/M2 | DIASTOLIC BLOOD PRESSURE: 70 MMHG | WEIGHT: 184 LBS | HEART RATE: 70 BPM

## 2024-01-23 DIAGNOSIS — G43.009 MIGRAINE WITHOUT AURA AND WITHOUT STATUS MIGRAINOSUS, NOT INTRACTABLE: Primary | ICD-10-CM

## 2024-01-23 PROCEDURE — 99214 OFFICE O/P EST MOD 30 MIN: CPT | Performed by: NURSE PRACTITIONER

## 2024-01-23 NOTE — PROGRESS NOTES
Patient ID: Larry Juárez is a 73 y.o. male.    Assessment/Plan:       Diagnoses and all orders for this visit:    Migraine without aura and without status migrainosus, not intractable       Continue propranolol 160 mg daily  Continue verapamil 240 mg daily at bedtime-adjusted by PCP for high BP  Can use Imitrex at the onset of his migraine headaches.  Increase oral hydration with water  Follow-up in the outpatient neurology office in 1 year or sooner if needed.      Subjective/HPI:  Larry Juárez is a 72yo male patient who follows for medical management of his migraines.  He has been on propranolol 160 mg daily as well as verapamil 180 mg at bedtime for prevention.  He takes Imitrex with baby aspirin for breakthrough migraine activity.  Patient had previously reported approximately 1 migraine per month, noted an increase in activity during severe weather.  He gets some blurred vision at that time however does feel that he has more blurred vision related to his cataracts and his migraines.  He has been seen by ophthalmology and has been doing well since he received a new eyeglass prescription.  Patient's typical migraines start in the left posterior portion of his head, notes that when the migraine is really bad it will radiate to his left eyeball which happens approximately 1 time per month.  He has been having some issues with hypertension and has been following with his PCP but continues on his propranolol and verapamil.    Last office appointment patient reported minimal migraine activity.  He had no complaints of migraines that his family members could recall.  He had been doing well on the medication and notes that his blood pressure is usually normal however was having some hypertension at that last office appointment.  He does take the Imitrex for abortive measure and has fairly good response to this.    Patient reports back to neurology office today, he is accompanied by his sister.  Patient states he  has had an increase in migraine activity recently, notes approximately 3 migraines per month.  According to the sister she is noticing an influx in his migraine activity primarily during weather changes.  She states he tends to take more sumatriptan during the fall and spring months.  She states he does not complain or report his migraines to her however she goes by the number of times she has to refill his sumatriptan.  We reviewed his refills status and it appears as though he averages about 3 migraine headaches per month.  Patient states he takes the sumatriptan and it does work relatively quickly is very effective for him.  He is tolerating his medication regimen well with the propranolol as well as the verapamil.  He has had ongoing issues with hypertension and his PCP did increase his verapamil dosing to 240 mg daily.  Patient is tolerating the blood pressure adjustments well, BP today is normotensive.  Patient does report however at times having some postural lightheadedness.  he states that is very random and not all the time.  His sister indicates that he does not hydrate well, he drinks approximately 8 ounces of water per day.  She states sometimes last.  Multiple providers have encouraged him to increase his oral hydration, from neurology perspective we do agree with increasing hydration to support the BP changes from his migraine medication and limit positional lightheadedness.  Currently patient denies lightheadedness, dizziness, headaches, vision changes, swallowing changes, imbalances, paresthesias weaknesses or falls.  He is doing well on his medication regimen and is happy with its effects.  Patient can continue his daily propranolol and verapamil dosing, can take Imitrex at the onset of his migraine headaches.  Patient has remained relatively stable on this medication regimen and will follow-up in the outpatient neurology office in 1 year or sooner if needed.      The following portions of the  patient's history were reviewed and updated as appropriate: allergies, current medications, past family history, past medical history, past social history, past surgical history, and problem list.      Past Medical History:   Diagnosis Date    Arthritis     Chest pain 02/01/2006    Chronic pain     lower back    Closed fracture of one rib 07/27/2007    Colon polyp     COPD (chronic obstructive pulmonary disease) (HCC)     Diabetes mellitus (HCC)     boarderline diabetes    Diverticulosis     Environmental allergies     GERD (gastroesophageal reflux disease)     Headache(784.0)     Hiatal hernia     HL (hearing loss)     [place] (hard of hearing)     uses hearing aides    Hyperlipidemia 01/05/2015    Hypertension     Migraine     Nasal congestion     Obesity     Sebaceous cyst 01/11/2016       Past Surgical History:   Procedure Laterality Date    COLONOSCOPY      EGD AND COLONOSCOPY      WISDOM TOOTH EXTRACTION         Social History     Socioeconomic History    Marital status: Single     Spouse name: None    Number of children: None    Years of education: None    Highest education level: None   Occupational History    None   Tobacco Use    Smoking status: Never     Passive exposure: Past    Smokeless tobacco: Never    Tobacco comments:     from infancy    Vaping Use    Vaping status: Never Used   Substance and Sexual Activity    Alcohol use: Yes     Alcohol/week: 2.0 standard drinks of alcohol     Types: 2 Glasses of wine per week     Comment: social    Drug use: No    Sexual activity: None   Other Topics Concern    None   Social History Narrative    None     Social Determinants of Health     Financial Resource Strain: Low Risk  (1/3/2024)    Overall Financial Resource Strain (CARDIA)     Difficulty of Paying Living Expenses: Not hard at all   Food Insecurity: Not on file   Transportation Needs: No Transportation Needs (1/3/2024)    PRAPARE - Transportation     Lack of Transportation (Medical): No     Lack of  Transportation (Non-Medical): No   Physical Activity: Not on file   Stress: Not on file   Social Connections: Not on file   Intimate Partner Violence: Not on file   Housing Stability: Not on file       Family History   Problem Relation Age of Onset    Arthritis Mother     COPD Mother     Hypertension Mother     Lung cancer Father     Asthma Brother     Cancer Brother         colon    Diabetes Sister     Hypertension Sister     Stomach cancer Brother     Asthma Brother          Current Outpatient Medications:     acetaminophen (TYLENOL) 650 mg CR tablet, Take 650 mg by mouth as needed daily, Disp: , Rfl:     Ascorbic Acid (VITAMIN C) 500 MG CAPS, Take by mouth 2 (two) times a day , Disp: , Rfl:     aspirin (ECOTRIN LOW STRENGTH) 81 mg EC tablet, Take 81 mg by mouth if needed, Disp: , Rfl:     atorvastatin (LIPITOR) 10 mg tablet, TAKE 1 TABLET BY MOUTH EVERY DAY, Disp: 90 tablet, Rfl: 1    Cyanocobalamin (VITAMIN B 12 PO), Take by mouth, Disp: , Rfl:     dulaglutide (Trulicity) 4.5 MG/0.5ML injection, Inject 0.5 mL (4.5 mg total) under the skin every 7 days, Disp: 6 mL, Rfl: 1    fluticasone (FLONASE) 50 mcg/act nasal spray, 1 spray into each nostril daily, Disp: , Rfl:     gabapentin (NEURONTIN) 300 mg capsule, Take 1 capsule (300 mg total) by mouth 2 (two) times a day, Disp: 180 capsule, Rfl: 1    Magnesium Oxide 400 MG CAPS, Take 400 mg by mouth 2 (two) times a day , Disp: , Rfl:     metFORMIN (GLUCOPHAGE-XR) 500 mg 24 hr tablet, Take 1 tablet (500 mg total) by mouth daily with dinner, Disp: 90 tablet, Rfl: 1    montelukast (SINGULAIR) 10 mg tablet, TAKE 1 TABLET (10 MG TOTAL) BY MOUTH DAILY WITH BREAKFAST, Disp: 90 tablet, Rfl: 1    Multiple Vitamins-Minerals (PRESERVISION AREDS PO), Take by mouth daily, Disp: , Rfl:     omega-3-acid ethyl esters (LOVAZA) 1 g capsule, TAKE 2 CAPSULES BY MOUTH TWICE A DAY, Disp: 360 capsule, Rfl: 1    omeprazole (PriLOSEC OTC) 20 MG tablet, Take 20 mg by mouth daily, Disp: , Rfl:  "    propranolol (INDERAL LA) 160 mg, TAKE 1 CAPSULE BY MOUTH DAILY., Disp: 90 capsule, Rfl: 3    ramipril (ALTACE) 10 MG capsule, TAKE 2 CAPSULES BY MOUTH EVERY DAY, Disp: 180 capsule, Rfl: 1    SUMAtriptan (IMITREX) 100 mg tablet, Take 1 tablet (100 mg total) by mouth once as needed for migraine, Disp: 9 tablet, Rfl: 1    Symbicort 160-4.5 MCG/ACT inhaler, INHALE 2 PUFFS BY MOUTH 2 TIMES A DAY RINSE MOUTH AFTER USE., Disp: 10.2 g, Rfl: 3    verapamil (CALAN-SR) 240 mg CR tablet, TAKE 1 TABLET (240 MG TOTAL) BY MOUTH DAILY AT BEDTIME, Disp: 90 tablet, Rfl: 1    albuterol (PROVENTIL HFA,VENTOLIN HFA) 90 mcg/act inhaler, INHALE 1-2 PUFFS BY MOUTH EVERY 4 HOURS AS NEEDED NEEDS ANNUAL APPT FOR REFILLS (Patient not taking: Reported on 1/23/2024), Disp: , Rfl:     Prolensa 0.07 % SOLN, LOCATION: RIGHT EYE. APPLY ONE DROP IN RIGHT EYE EVERY MORNING STARTING MONDAY BEFORE SURGERY (Patient not taking: Reported on 1/2/2024), Disp: , Rfl:     Allergies   Allergen Reactions    Other Sneezing     SEASONAL   Other reaction(s): Sneezing  SEASONAL         Blood pressure 130/70, pulse 70, height 5' 1.5\" (1.562 m), weight 83.5 kg (184 lb).               Objective:    Blood pressure 130/70, pulse 70, height 5' 1.5\" (1.562 m), weight 83.5 kg (184 lb).    Physical Exam  Vitals reviewed.   Constitutional:       Appearance: Normal appearance. He is well-developed.   HENT:      Head: Normocephalic.      Right Ear: Hearing normal.      Left Ear: Hearing normal.      Nose: Nose normal.      Mouth/Throat:      Mouth: Mucous membranes are moist.   Eyes:      General: Lids are normal.      Extraocular Movements: Extraocular movements intact.      Conjunctiva/sclera: Conjunctivae normal.      Pupils: Pupils are equal, round, and reactive to light.   Cardiovascular:      Rate and Rhythm: Normal rate.   Pulmonary:      Effort: Pulmonary effort is normal. No respiratory distress.   Abdominal:      Palpations: Abdomen is soft.      Tenderness: There " is no abdominal tenderness.   Musculoskeletal:         General: Normal range of motion.      Cervical back: Normal range of motion.   Skin:     General: Skin is warm and dry.   Neurological:      Mental Status: He is alert.      Motor: Motor strength is normal.     Coordination: Romberg sign negative.      Deep Tendon Reflexes: Reflexes are normal and symmetric.   Psychiatric:         Attention and Perception: Attention and perception normal.         Mood and Affect: Mood and affect normal.         Speech: Speech normal.         Behavior: Behavior normal. Behavior is cooperative.         Thought Content: Thought content normal.         Cognition and Memory: Cognition and memory normal.         Judgment: Judgment normal.         Neurological Exam  Mental Status  Alert. Oriented to person, place, time and situation. Memory is normal. Recent and remote memory are intact. Speech is normal. Language is fluent with no aphasia. Attention and concentration are normal. Fund of knowledge is appropriate for level of education.    Cranial Nerves  CN II: Visual acuity is normal. Visual fields full to confrontation.  CN III, IV, VI: Extraocular movements intact bilaterally. Normal lids and orbits bilaterally. Pupils equal round and reactive to light bilaterally.  CN V: Facial sensation is normal.  CN VII: Full and symmetric facial movement.  CN VIII:  Right: Hearing is normal.  Left: Hearing is normal.  CN IX, X: Palate elevates symmetrically. Normal gag reflex.  CN XI: Shoulder shrug strength is normal.  CN XII: Tongue midline without atrophy or fasciculations.    Motor  Normal muscle bulk throughout. Normal muscle tone. No abnormal involuntary movements. Strength is 5/5 throughout all four extremities.    Sensory  Light touch is normal in upper and lower extremities. Temperature is normal in upper and lower extremities. Vibration is normal in upper and lower extremities. Proprioception is normal in upper and lower extremities.      Reflexes  Deep tendon reflexes are 2+ and symmetric in all four extremities.    Right pathological reflexes: Herbert's absent.  Left pathological reflexes: Herbert's absent.    Coordination  Right: Finger-to-nose normal. Rapid alternating movement normal. Heel-to-shin normal.Left: Finger-to-nose normal. Rapid alternating movement normal. Heel-to-shin normal.    Gait  Casual gait is normal including stance, stride, and arm swing.Normal toe walking. Normal heel walking. Normal tandem gait. Romberg is absent. Able to rise from chair without using arms.        ROS:    Review of Systems   Constitutional:  Negative for appetite change, fatigue and fever.   HENT: Negative.  Negative for hearing loss, tinnitus, trouble swallowing and voice change.    Eyes: Negative.  Negative for photophobia, pain and visual disturbance.   Respiratory: Negative.  Negative for shortness of breath.    Cardiovascular: Negative.  Negative for palpitations.   Gastrointestinal: Negative.  Negative for nausea and vomiting.   Endocrine: Negative.  Negative for cold intolerance.   Genitourinary: Negative.  Negative for dysuria, frequency and urgency.   Musculoskeletal:  Negative for back pain, gait problem, myalgias, neck pain and neck stiffness.   Skin: Negative.  Negative for rash.   Allergic/Immunologic: Negative.    Neurological: Negative.  Negative for dizziness, tremors, seizures, syncope, facial asymmetry, speech difficulty, weakness, light-headedness, numbness and headaches.   Hematological: Negative.  Does not bruise/bleed easily.   Psychiatric/Behavioral: Negative.  Negative for confusion, hallucinations and sleep disturbance.        ROS reviewed and discussed with the patient and his sister.

## 2024-01-23 NOTE — PATIENT INSTRUCTIONS
Continue propranolol 160 mg daily  Continue verapamil 240 mg daily at bedtime-adjusted by PCP for high BP  Can use Imitrex at the onset of his migraine headaches.  Increase oral hydration with water  Follow-up in the outpatient neurology office in 1 year or sooner if needed.

## 2024-02-17 PROBLEM — H61.23 BILATERAL IMPACTED CERUMEN: Status: RESOLVED | Noted: 2018-10-12 | Resolved: 2024-02-17

## 2024-02-21 PROBLEM — Z00.00 MEDICARE ANNUAL WELLNESS VISIT, SUBSEQUENT: Status: RESOLVED | Noted: 2019-10-21 | Resolved: 2024-02-21

## 2024-02-21 PROBLEM — Z12.5 PROSTATE CANCER SCREENING: Status: RESOLVED | Noted: 2019-10-21 | Resolved: 2024-02-21

## 2024-02-21 PROBLEM — Z12.11 COLON CANCER SCREENING: Status: RESOLVED | Noted: 2024-01-03 | Resolved: 2024-02-21

## 2024-02-23 DIAGNOSIS — E78.5 HYPERLIPIDEMIA LDL GOAL <130: ICD-10-CM

## 2024-02-23 RX ORDER — ATORVASTATIN CALCIUM 10 MG/1
TABLET, FILM COATED ORAL
Qty: 90 TABLET | Refills: 1 | Status: SHIPPED | OUTPATIENT
Start: 2024-02-23

## 2024-03-03 DIAGNOSIS — G43.009 MIGRAINE WITHOUT AURA AND WITHOUT STATUS MIGRAINOSUS, NOT INTRACTABLE: ICD-10-CM

## 2024-03-04 RX ORDER — SUMATRIPTAN 100 MG/1
TABLET, FILM COATED ORAL
Qty: 9 TABLET | Refills: 1 | Status: SHIPPED | OUTPATIENT
Start: 2024-03-04

## 2024-03-26 DIAGNOSIS — R06.09 DOE (DYSPNEA ON EXERTION): ICD-10-CM

## 2024-03-26 DIAGNOSIS — J45.30 MILD PERSISTENT ASTHMA WITHOUT COMPLICATION: ICD-10-CM

## 2024-03-26 RX ORDER — BUDESONIDE AND FORMOTEROL FUMARATE DIHYDRATE 160; 4.5 UG/1; UG/1
2 AEROSOL RESPIRATORY (INHALATION) 2 TIMES DAILY
Qty: 30.6 G | Refills: 1 | Status: SHIPPED | OUTPATIENT
Start: 2024-03-26

## 2024-04-05 DIAGNOSIS — N18.2 TYPE 2 DIABETES MELLITUS WITH STAGE 2 CHRONIC KIDNEY DISEASE, WITHOUT LONG-TERM CURRENT USE OF INSULIN  (HCC): ICD-10-CM

## 2024-04-05 DIAGNOSIS — E11.22 TYPE 2 DIABETES MELLITUS WITH STAGE 2 CHRONIC KIDNEY DISEASE, WITHOUT LONG-TERM CURRENT USE OF INSULIN  (HCC): ICD-10-CM

## 2024-04-05 NOTE — TELEPHONE ENCOUNTER
"Pt's sister Olivia called in stating they have not been able to get a refill for this medication in their area but she is currently in [place] and the [facility] in [facility] does have the medication currently in stock. She states the pharmacy is waiting on a approval from us though \"cover my meds\" to be refilled.     Please advise, Olivia will be in the area for only a few more hours.    Send to:  [address] [address]  Phone number: [phone]    Olivia's number: [phone]  "

## 2024-04-13 LAB
ALBUMIN SERPL-MCNC: 4.3 G/DL (ref 3.8–4.8)
ALBUMIN/GLOB SERPL: 2 {RATIO} (ref 1.2–2.2)
ALP SERPL-CCNC: 146 IU/L (ref 44–121)
ALT SERPL-CCNC: 63 IU/L (ref 0–44)
AST SERPL-CCNC: 39 IU/L (ref 0–40)
BILIRUB SERPL-MCNC: 0.4 MG/DL (ref 0–1.2)
BUN SERPL-MCNC: 27 MG/DL (ref 8–27)
BUN/CREAT SERPL: 23 (ref 10–24)
CALCIUM SERPL-MCNC: 9.6 MG/DL (ref 8.6–10.2)
CHLORIDE SERPL-SCNC: 102 MMOL/L (ref 96–106)
CO2 SERPL-SCNC: 22 MMOL/L (ref 20–29)
CREAT SERPL-MCNC: 1.15 MG/DL (ref 0.76–1.27)
EGFR: 67 ML/MIN/1.73
GLOBULIN SER-MCNC: 2.2 G/DL (ref 1.5–4.5)
GLUCOSE SERPL-MCNC: 207 MG/DL (ref 70–99)
HBA1C MFR BLD: 8.5 % (ref 4.8–5.6)
POTASSIUM SERPL-SCNC: 5.1 MMOL/L (ref 3.5–5.2)
PROT SERPL-MCNC: 6.5 G/DL (ref 6–8.5)
SODIUM SERPL-SCNC: 140 MMOL/L (ref 134–144)

## 2024-04-16 ENCOUNTER — OFFICE VISIT (OUTPATIENT)
Dept: FAMILY MEDICINE CLINIC | Facility: CLINIC | Age: 74
End: 2024-04-16
Payer: COMMERCIAL

## 2024-04-16 VITALS
HEART RATE: 72 BPM | TEMPERATURE: 99.7 F | DIASTOLIC BLOOD PRESSURE: 78 MMHG | RESPIRATION RATE: 22 BRPM | WEIGHT: 182.2 LBS | BODY MASS INDEX: 33.53 KG/M2 | SYSTOLIC BLOOD PRESSURE: 136 MMHG | HEIGHT: 62 IN

## 2024-04-16 DIAGNOSIS — Z86.010 PERSONAL HISTORY OF COLONIC POLYPS: ICD-10-CM

## 2024-04-16 DIAGNOSIS — N18.2 CKD STAGE 2 DUE TO TYPE 2 DIABETES MELLITUS  (HCC): ICD-10-CM

## 2024-04-16 DIAGNOSIS — Z12.11 COLON CANCER SCREENING: Primary | ICD-10-CM

## 2024-04-16 DIAGNOSIS — E11.22 CKD STAGE 2 DUE TO TYPE 2 DIABETES MELLITUS  (HCC): ICD-10-CM

## 2024-04-16 DIAGNOSIS — N18.2 TYPE 2 DIABETES MELLITUS WITH STAGE 2 CHRONIC KIDNEY DISEASE, WITHOUT LONG-TERM CURRENT USE OF INSULIN  (HCC): ICD-10-CM

## 2024-04-16 DIAGNOSIS — I10 BENIGN ESSENTIAL HYPERTENSION: ICD-10-CM

## 2024-04-16 DIAGNOSIS — E11.22 TYPE 2 DIABETES MELLITUS WITH STAGE 2 CHRONIC KIDNEY DISEASE, WITHOUT LONG-TERM CURRENT USE OF INSULIN  (HCC): ICD-10-CM

## 2024-04-16 PROCEDURE — G2211 COMPLEX E/M VISIT ADD ON: HCPCS | Performed by: FAMILY MEDICINE

## 2024-04-16 PROCEDURE — 99214 OFFICE O/P EST MOD 30 MIN: CPT | Performed by: FAMILY MEDICINE

## 2024-04-16 NOTE — PROGRESS NOTES
Assessment/Plan:    No problem-specific Assessment & Plan notes found for this encounter.    DM2 improving but has not been on consistent does of trulicity 4.5mg due to availability  Continue same but recheck in 3m for med changes    Ckd2 stable, stay hydrated    Increase exercise for wt loss    Htn stable, continue meds     Diagnoses and all orders for this visit:    Colon cancer screening    Personal history of colonic polyps  -     Ambulatory referral to Gastroenterology; Future    Type 2 diabetes mellitus with stage 2 chronic kidney disease, without long-term current use of insulin  (HCC)  -     Comprehensive metabolic panel; Future  -     Hemoglobin A1C; Future    CKD stage 2 due to type 2 diabetes mellitus  (HCC)    Benign essential hypertension              Return in about 13 weeks (around 7/16/2024).    Subjective:      Patient ID: Larry Juárez is a 74 y.o. male.    Chief Complaint   Patient presents with    Follow-up     YC       HPI  Taking meds  Follows diet  Not making the best choices though  Has candy, bread, bagels, stick bun  Living on own but next door to sister  On trulicity 3.0 for past 3w since ran out of 4.5mg  Wt same    Lab Results   Component Value Date    HGBA1C 8.5 (H) 04/12/2024    HGBA1C 9.0 (H) 12/15/2023    HGBA1C 9.1 (H) 09/12/2023     Lab Results   Component Value Date    GLUF 205 (H) 09/12/2023    LDLCALC  04/04/2023      Comment:      Calculated LDL invalid, triglycerides >400 mg/dl    CREATININE 1.15 04/12/2024         The following portions of the patient's history were reviewed and updated as appropriate: allergies, current medications, past family history, past medical history, past social history, past surgical history and problem list.    Review of Systems   Constitutional:  Negative for chills and fever.   Gastrointestinal:  Negative for nausea and vomiting.         Current Outpatient Medications   Medication Sig Dispense Refill    acetaminophen (TYLENOL) 650 mg CR tablet  "Take 650 mg by mouth as needed daily      albuterol (PROVENTIL HFA,VENTOLIN HFA) 90 mcg/act inhaler       Ascorbic Acid (VITAMIN C) 500 MG CAPS Take by mouth 2 (two) times a day       aspirin (ECOTRIN LOW STRENGTH) 81 mg EC tablet Take 81 mg by mouth if needed      atorvastatin (LIPITOR) 10 mg tablet TAKE 1 TABLET BY MOUTH EVERY DAY 90 tablet 1    Cyanocobalamin (VITAMIN B 12 PO) Take by mouth      dulaglutide (Trulicity) 4.5 MG/0.5ML injection Inject 0.5 mL (4.5 mg total) under the skin every 7 days 6 mL 1    fluticasone (FLONASE) 50 mcg/act nasal spray 1 spray into each nostril daily      gabapentin (NEURONTIN) 300 mg capsule Take 1 capsule (300 mg total) by mouth 2 (two) times a day 180 capsule 1    Magnesium Oxide 400 MG CAPS Take 400 mg by mouth 2 (two) times a day       metFORMIN (GLUCOPHAGE-XR) 500 mg 24 hr tablet Take 1 tablet (500 mg total) by mouth daily with dinner 90 tablet 1    montelukast (SINGULAIR) 10 mg tablet TAKE 1 TABLET (10 MG TOTAL) BY MOUTH DAILY WITH BREAKFAST 90 tablet 1    Multiple Vitamins-Minerals (PRESERVISION AREDS PO) Take by mouth daily      omega-3-acid ethyl esters (LOVAZA) 1 g capsule TAKE 2 CAPSULES BY MOUTH TWICE A  capsule 1    omeprazole (PriLOSEC OTC) 20 MG tablet Take 20 mg by mouth daily      propranolol (INDERAL LA) 160 mg TAKE 1 CAPSULE BY MOUTH DAILY. 90 capsule 3    ramipril (ALTACE) 10 MG capsule TAKE 2 CAPSULES BY MOUTH EVERY  capsule 1    SUMAtriptan (IMITREX) 100 mg tablet TAKE 1 TABLET BY MOUTH ONCE AS NEEDED FOR MIGRAINE 9 tablet 1    Symbicort 160-4.5 MCG/ACT inhaler INHALE 2 PUFFS TWICE A DAY RINSE MOUTH AFTER USE 30.6 g 1    verapamil (CALAN-SR) 240 mg CR tablet TAKE 1 TABLET (240 MG TOTAL) BY MOUTH DAILY AT BEDTIME 90 tablet 1     No current facility-administered medications for this visit.       Objective:    /78   Pulse 72   Temp 99.7 °F (37.6 °C) (Tympanic)   Resp 22   Ht 5' 1.5\" (1.562 m)   Wt 82.6 kg (182 lb 3.2 oz)   BMI 33.87 " kg/m²        Physical Exam  Vitals and nursing note reviewed.   Constitutional:       General: He is not in acute distress.     Appearance: He is well-developed. He is obese. He is not ill-appearing.   HENT:      Head: Normocephalic.   Eyes:      General: No scleral icterus.     Conjunctiva/sclera: Conjunctivae normal.   Cardiovascular:      Rate and Rhythm: Normal rate and regular rhythm.      Pulses: no weak pulses.           Dorsalis pedis pulses are 2+ on the right side and 2+ on the left side.   Pulmonary:      Effort: Pulmonary effort is normal. No respiratory distress.      Breath sounds: No wheezing.   Abdominal:      Palpations: Abdomen is soft.   Musculoskeletal:         General: No deformity.      Cervical back: Neck supple.   Skin:     General: Skin is warm and dry.      Coloration: Skin is not pale.   Neurological:      Mental Status: He is alert.      Gait: Gait normal.   Psychiatric:         Mood and Affect: Mood normal.         Behavior: Behavior normal.         Thought Content: Thought content normal.       Diabetic Foot Exam    Patient's shoes and socks removed.    Right Foot/Ankle   Right Foot Inspection  Skin Exam: skin intact. No abnormal color.     Sensory   Monofilament testing: intact    Vascular  The right DP pulse is 2+.     Left Foot/Ankle  Left Foot Inspection  Skin Exam: skin intact. Normal color.     Sensory   Monofilament testing: intact    Vascular  The left DP pulse is 2+.     Assign Risk Category  No deformity present  No loss of protective sensation  No weak pulses  Risk: 0           Arley Ramirez DO

## 2024-04-16 NOTE — PATIENT INSTRUCTIONS
If your plan covers and your pharmacy has other options than trulicity, such as ozempic weekly or mounjaro weekly, let me know.

## 2024-05-24 DIAGNOSIS — E78.1 HIGH TRIGLYCERIDES: ICD-10-CM

## 2024-05-24 DIAGNOSIS — I10 BENIGN ESSENTIAL HYPERTENSION: ICD-10-CM

## 2024-05-24 RX ORDER — OMEGA-3-ACID ETHYL ESTERS 1 G/1
CAPSULE, LIQUID FILLED ORAL
Qty: 360 CAPSULE | Refills: 1 | Status: SHIPPED | OUTPATIENT
Start: 2024-05-24

## 2024-05-24 RX ORDER — RAMIPRIL 10 MG/1
CAPSULE ORAL
Qty: 180 CAPSULE | Refills: 1 | Status: SHIPPED | OUTPATIENT
Start: 2024-05-24

## 2024-05-29 ENCOUNTER — TELEPHONE (OUTPATIENT)
Age: 74
End: 2024-05-29

## 2024-05-29 NOTE — TELEPHONE ENCOUNTER
She states that she rather have you go ahead and place the order, and then she will know  Paradise Spain MA

## 2024-05-29 NOTE — TELEPHONE ENCOUNTER
Olivia (sister) called and states pharmacy can no longer get Trulicity but does have Ozempic. If any questions can be reached at [phone] and uses [facility] in [facility]

## 2024-05-29 NOTE — TELEPHONE ENCOUNTER
Can she let us know if Ozempic is covered under his plan so I can send it? (But they would have to start on the bottom dose of ozempic and work his way up monthly.

## 2024-05-30 DIAGNOSIS — N18.2 TYPE 2 DIABETES MELLITUS WITH STAGE 2 CHRONIC KIDNEY DISEASE, WITHOUT LONG-TERM CURRENT USE OF INSULIN  (HCC): ICD-10-CM

## 2024-05-30 DIAGNOSIS — E11.22 TYPE 2 DIABETES MELLITUS WITH STAGE 2 CHRONIC KIDNEY DISEASE, WITHOUT LONG-TERM CURRENT USE OF INSULIN  (HCC): ICD-10-CM

## 2024-06-20 DIAGNOSIS — G43.009 MIGRAINE WITHOUT AURA AND WITHOUT STATUS MIGRAINOSUS, NOT INTRACTABLE: ICD-10-CM

## 2024-06-20 RX ORDER — VERAPAMIL HYDROCHLORIDE 240 MG/1
240 TABLET, FILM COATED, EXTENDED RELEASE ORAL
Qty: 90 TABLET | Refills: 1 | Status: SHIPPED | OUTPATIENT
Start: 2024-06-20

## 2024-06-29 DIAGNOSIS — E11.22 TYPE 2 DIABETES MELLITUS WITH STAGE 2 CHRONIC KIDNEY DISEASE, WITHOUT LONG-TERM CURRENT USE OF INSULIN  (HCC): ICD-10-CM

## 2024-06-29 DIAGNOSIS — N18.2 TYPE 2 DIABETES MELLITUS WITH STAGE 2 CHRONIC KIDNEY DISEASE, WITHOUT LONG-TERM CURRENT USE OF INSULIN  (HCC): ICD-10-CM

## 2024-06-29 RX ORDER — METFORMIN HYDROCHLORIDE 500 MG/1
500 TABLET, EXTENDED RELEASE ORAL
Qty: 90 TABLET | Refills: 1 | Status: SHIPPED | OUTPATIENT
Start: 2024-06-29

## 2024-07-01 DIAGNOSIS — M54.50 CHRONIC BILATERAL LOW BACK PAIN WITHOUT SCIATICA: ICD-10-CM

## 2024-07-01 DIAGNOSIS — G89.29 CHRONIC BILATERAL LOW BACK PAIN WITHOUT SCIATICA: ICD-10-CM

## 2024-07-02 RX ORDER — GABAPENTIN 300 MG/1
300 CAPSULE ORAL 2 TIMES DAILY
Qty: 180 CAPSULE | Refills: 1 | Status: SHIPPED | OUTPATIENT
Start: 2024-07-02

## 2024-08-02 LAB — HBA1C MFR BLD HPLC: 9.2 %

## 2024-08-06 ENCOUNTER — OFFICE VISIT (OUTPATIENT)
Dept: FAMILY MEDICINE CLINIC | Facility: CLINIC | Age: 74
End: 2024-08-06
Payer: COMMERCIAL

## 2024-08-06 VITALS
BODY MASS INDEX: 32.65 KG/M2 | DIASTOLIC BLOOD PRESSURE: 76 MMHG | SYSTOLIC BLOOD PRESSURE: 124 MMHG | WEIGHT: 177.4 LBS | RESPIRATION RATE: 18 BRPM | TEMPERATURE: 97.8 F | HEIGHT: 62 IN | HEART RATE: 84 BPM

## 2024-08-06 DIAGNOSIS — I10 BENIGN ESSENTIAL HYPERTENSION: ICD-10-CM

## 2024-08-06 DIAGNOSIS — E11.22 TYPE 2 DIABETES MELLITUS WITH STAGE 2 CHRONIC KIDNEY DISEASE, WITHOUT LONG-TERM CURRENT USE OF INSULIN  (HCC): Primary | ICD-10-CM

## 2024-08-06 DIAGNOSIS — N18.2 TYPE 2 DIABETES MELLITUS WITH STAGE 2 CHRONIC KIDNEY DISEASE, WITHOUT LONG-TERM CURRENT USE OF INSULIN  (HCC): Primary | ICD-10-CM

## 2024-08-06 DIAGNOSIS — Z86.010 PERSONAL HISTORY OF COLONIC POLYPS: ICD-10-CM

## 2024-08-06 DIAGNOSIS — Z12.11 COLON CANCER SCREENING: ICD-10-CM

## 2024-08-06 DIAGNOSIS — Z13.1 SCREENING FOR DIABETES MELLITUS (DM): ICD-10-CM

## 2024-08-06 DIAGNOSIS — E11.22 CKD STAGE 2 DUE TO TYPE 2 DIABETES MELLITUS  (HCC): ICD-10-CM

## 2024-08-06 DIAGNOSIS — B35.4 TINEA CORPORIS: ICD-10-CM

## 2024-08-06 DIAGNOSIS — N18.2 CKD STAGE 2 DUE TO TYPE 2 DIABETES MELLITUS  (HCC): ICD-10-CM

## 2024-08-06 PROBLEM — N28.9 KIDNEY DISORDER: Status: RESOLVED | Noted: 2020-12-03 | Resolved: 2024-08-06

## 2024-08-06 PROCEDURE — 99214 OFFICE O/P EST MOD 30 MIN: CPT | Performed by: FAMILY MEDICINE

## 2024-08-06 PROCEDURE — G2211 COMPLEX E/M VISIT ADD ON: HCPCS | Performed by: FAMILY MEDICINE

## 2024-08-06 NOTE — PROGRESS NOTES
Assessment/Plan:    No problem-specific Assessment & Plan notes found for this encounter.    Dm2 uncontrolled  Was on highest dose trulicity in past before change to ozempic  Increase ozempic  F/u 3m    Right arm rash  Suspect tinea  Nystatin/triam cream    Htn stable    Bmi aware  Wt loss suggested     Diagnoses and all orders for this visit:    Type 2 diabetes mellitus with stage 2 chronic kidney disease, without long-term current use of insulin  (HCC)  -     semaglutide, 1 mg/dose, (Ozempic) 4 mg/3 mL injection pen; Inject 0.75 mL (1 mg total) under the skin once a week  -     Comprehensive metabolic panel; Future  -     Hemoglobin A1C; Future  -     Comprehensive metabolic panel  -     Hemoglobin A1C    Colon cancer screening    Personal history of colonic polyps    Screening for diabetes mellitus (DM)    Tinea corporis  -     nystatin-triamcinolone (MYCOLOG-II) cream; Apply topically 2 (two) times a day Sparingly (short term) to affected area: right arm    CKD stage 2 due to type 2 diabetes mellitus  (HCC)    Benign essential hypertension        Return in about 4 months (around 12/12/2024) for Recheck.    Subjective:      Patient ID: Larry Juárez is a 74 y.o. male.    Chief Complaint   Patient presents with    Follow-up     YC       HPI  Some wt loss  Less sweets  Goes to [facility] 2x/w  Recent labs A1c 9.2  On ozempic 0.5mg  Fbs 194    The following portions of the patient's history were reviewed and updated as appropriate: allergies, current medications, past family history, past medical history, past social history, past surgical history and problem list.    Review of Systems   Respiratory:  Negative for shortness of breath.    Cardiovascular:  Negative for chest pain.         Current Outpatient Medications   Medication Sig Dispense Refill    acetaminophen (TYLENOL) 650 mg CR tablet Take 650 mg by mouth as needed daily      albuterol (PROVENTIL HFA,VENTOLIN HFA) 90 mcg/act inhaler       Ascorbic Acid (VITAMIN C)  "500 MG CAPS Take by mouth 2 (two) times a day       aspirin (ECOTRIN LOW STRENGTH) 81 mg EC tablet Take 81 mg by mouth if needed      atorvastatin (LIPITOR) 10 mg tablet TAKE 1 TABLET BY MOUTH EVERY DAY 90 tablet 1    Cyanocobalamin (VITAMIN B 12 PO) Take by mouth      fluticasone (FLONASE) 50 mcg/act nasal spray 1 spray into each nostril daily      gabapentin (NEURONTIN) 300 mg capsule Take 1 capsule (300 mg total) by mouth 2 (two) times a day 180 capsule 1    Magnesium Oxide 400 MG CAPS Take 400 mg by mouth 2 (two) times a day       metFORMIN (GLUCOPHAGE-XR) 500 mg 24 hr tablet TAKE 1 TABLET BY MOUTH EVERY DAY WITH DINNER 90 tablet 1    montelukast (SINGULAIR) 10 mg tablet TAKE 1 TABLET (10 MG TOTAL) BY MOUTH DAILY WITH BREAKFAST 90 tablet 1    Multiple Vitamins-Minerals (PRESERVISION AREDS PO) Take by mouth daily      nystatin-triamcinolone (MYCOLOG-II) cream Apply topically 2 (two) times a day Sparingly (short term) to affected area: right arm 60 g 0    omega-3-acid ethyl esters (LOVAZA) 1 g capsule TAKE 2 CAPSULES BY MOUTH TWICE A  capsule 1    omeprazole (PriLOSEC OTC) 20 MG tablet Take 20 mg by mouth daily      propranolol (INDERAL LA) 160 mg TAKE 1 CAPSULE BY MOUTH DAILY. 90 capsule 3    ramipril (ALTACE) 10 MG capsule TAKE 2 CAPSULES BY MOUTH EVERY  capsule 1    semaglutide, 1 mg/dose, (Ozempic) 4 mg/3 mL injection pen Inject 0.75 mL (1 mg total) under the skin once a week 3 mL 0    SUMAtriptan (IMITREX) 100 mg tablet TAKE 1 TABLET BY MOUTH ONCE AS NEEDED FOR MIGRAINE 9 tablet 1    Symbicort 160-4.5 MCG/ACT inhaler INHALE 2 PUFFS TWICE A DAY RINSE MOUTH AFTER USE 30.6 g 1    verapamil (CALAN-SR) 240 mg CR tablet TAKE 1 TABLET (240 MG TOTAL) BY MOUTH DAILY AT BEDTIME 90 tablet 1     No current facility-administered medications for this visit.       Objective:    /76   Pulse 84   Temp 97.8 °F (36.6 °C) (Tympanic)   Resp 18   Ht 5' 1.5\" (1.562 m)   Wt 80.5 kg (177 lb 6.4 oz)   BMI " 32.98 kg/m²        Physical Exam  Vitals and nursing note reviewed.   Constitutional:       General: He is not in acute distress.     Appearance: He is well-developed. He is obese. He is not ill-appearing.   HENT:      Head: Normocephalic.   Eyes:      General: No scleral icterus.     Conjunctiva/sclera: Conjunctivae normal.   Cardiovascular:      Rate and Rhythm: Normal rate and regular rhythm.   Pulmonary:      Effort: Pulmonary effort is normal. No respiratory distress.      Breath sounds: No wheezing.   Abdominal:      Palpations: Abdomen is soft.      Tenderness: There is no abdominal tenderness.   Musculoskeletal:         General: No deformity.      Cervical back: Neck supple.   Skin:     General: Skin is warm and dry.      Coloration: Skin is not pale.      Findings: Rash present.      Comments: Right forearm   Neurological:      Mental Status: He is alert.      Motor: No weakness.      Gait: Gait normal.   Psychiatric:         Mood and Affect: Mood normal.         Behavior: Behavior normal.         Thought Content: Thought content normal.                Arley Ramirez DO

## 2024-08-23 DIAGNOSIS — E78.5 HYPERLIPIDEMIA LDL GOAL <130: ICD-10-CM

## 2024-08-23 RX ORDER — ATORVASTATIN CALCIUM 10 MG/1
TABLET, FILM COATED ORAL
Qty: 30 TABLET | Refills: 0 | Status: SHIPPED | OUTPATIENT
Start: 2024-08-23 | End: 2024-08-24 | Stop reason: SDUPTHER

## 2024-08-24 DIAGNOSIS — E78.5 HYPERLIPIDEMIA LDL GOAL <130: ICD-10-CM

## 2024-08-24 RX ORDER — ATORVASTATIN CALCIUM 10 MG/1
10 TABLET, FILM COATED ORAL DAILY
Qty: 90 TABLET | Refills: 1 | Status: SHIPPED | OUTPATIENT
Start: 2024-08-24

## 2024-08-27 DIAGNOSIS — I10 BENIGN ESSENTIAL HYPERTENSION: ICD-10-CM

## 2024-08-27 DIAGNOSIS — E11.22 TYPE 2 DIABETES MELLITUS WITH STAGE 2 CHRONIC KIDNEY DISEASE, WITHOUT LONG-TERM CURRENT USE OF INSULIN  (HCC): ICD-10-CM

## 2024-08-27 DIAGNOSIS — G43.009 MIGRAINE WITHOUT AURA AND WITHOUT STATUS MIGRAINOSUS, NOT INTRACTABLE: ICD-10-CM

## 2024-08-27 DIAGNOSIS — N18.2 TYPE 2 DIABETES MELLITUS WITH STAGE 2 CHRONIC KIDNEY DISEASE, WITHOUT LONG-TERM CURRENT USE OF INSULIN  (HCC): ICD-10-CM

## 2024-08-27 RX ORDER — PROPRANOLOL HYDROCHLORIDE 160 MG/1
160 CAPSULE, EXTENDED RELEASE ORAL DAILY
Qty: 90 CAPSULE | Refills: 1 | Status: SHIPPED | OUTPATIENT
Start: 2024-08-27

## 2024-09-01 DIAGNOSIS — G43.009 MIGRAINE WITHOUT AURA AND WITHOUT STATUS MIGRAINOSUS, NOT INTRACTABLE: ICD-10-CM

## 2024-09-02 RX ORDER — SUMATRIPTAN 100 MG/1
TABLET, FILM COATED ORAL
Qty: 9 TABLET | Refills: 1 | Status: SHIPPED | OUTPATIENT
Start: 2024-09-02

## 2024-09-09 NOTE — RESULT NOTES
Discussion/Summary   Sugar is in prediabetes range but kidneys, minerals and liver are normal/improving  Continue to drink plenty of water daily    Dr Mandy Sanabria           Verified Results  (1) COMPREHENSIVE METABOLIC PANEL [id number] 13:[id number]     Test Name Result Flag Reference   Glucose, Serum 117 mg/dL H 65-99   BUN 31 mg/dL H 8-27   Creatinine, Serum 1 46 mg/dL H 0 76-1 27   BUN/Creatinine Ratio 21  10-24   Sodium, Serum 140 mmol/L  134-144   Potassium, Serum 4 5 mmol/L  3 5-5 2   Chloride, Serum 102 mmol/L     Carbon Dioxide, Total 20 mmol/L  18-29   Calcium, Serum 9 1 mg/dL  8 6-10 2   Protein, Total, Serum 6 6 g/dL  6 0-8 5   Albumin, Serum 4 3 g/dL  3 6-4 8   Globulin, Total 2 3 g/dL  1 5-4 5   A/G Ratio 1 9  1 2-2 2   Bilirubin, Total 0 4 mg/dL  0 0-1 2   Alkaline Phosphatase, S 84 IU/L     AST (SGOT) 22 IU/L  0-40   ALT (SGPT) 23 IU/L  0-44   eGFR If NonAfricn Am 49 mL/min/1 73 L >59   eGFR If Africn Am 57 mL/min/1 73 L >59
(yrs)

## 2024-10-15 ENCOUNTER — OFFICE VISIT (OUTPATIENT)
Dept: OTOLARYNGOLOGY | Facility: CLINIC | Age: 74
End: 2024-10-15
Payer: COMMERCIAL

## 2024-10-15 VITALS
TEMPERATURE: 97.4 F | BODY MASS INDEX: 32.57 KG/M2 | OXYGEN SATURATION: 98 % | HEIGHT: 62 IN | WEIGHT: 177 LBS | HEART RATE: 72 BPM

## 2024-10-15 DIAGNOSIS — H90.3 SENSORINEURAL HEARING LOSS (SNHL), BILATERAL: ICD-10-CM

## 2024-10-15 DIAGNOSIS — H61.23 BILATERAL IMPACTED CERUMEN: Primary | ICD-10-CM

## 2024-10-15 PROCEDURE — 99213 OFFICE O/P EST LOW 20 MIN: CPT | Performed by: NURSE PRACTITIONER

## 2024-10-15 PROCEDURE — 69210 REMOVE IMPACTED EAR WAX UNI: CPT | Performed by: NURSE PRACTITIONER

## 2024-10-15 NOTE — PROGRESS NOTES
Assessment/Plan:      Diagnoses and all orders for this visit:    Bilateral impacted cerumen    Sensorineural hearing loss (SNHL), bilateral    Other orders  -     Ear cerumen removal          Here today with caregiver (sister)  On exam noted bilateral cerumen impaction and unable to fully view tympanic membrane.  Cerumen impaction removed bilateral eac with #5 suction, pt tolerated procedure well.  Upon removal, improved hearing and decreased clogged sensation of bilateral ears.  Discussed routine cerumen care including avoidance of q-tips and cerumen softeners.  Encourage ongoing follow up annually to monitor for cerumen and hearing.  Audiogram if symptoms worsen.     Sensorineural hearing loss (SNHL), bilateral  Continue binaural hearing amplification.  Audiogram prn hearing worsening    Subjective:     Patient ID: Larry Aragon is a 74 y.o. male.    Presents today for follow up due to bilateral hearing loss.  Hearing gradually worsening.  Bilateral hearing aids.  During routine check for aids informed of wax blocking ears.  No otalgia.  No otorrhea.    Here with caregiver/sister  Social Note Larry loves anything [place]               Review of Systems   Constitutional: Negative.    HENT:  Positive for hearing loss. Negative for congestion, ear discharge, ear pain, nosebleeds, postnasal drip, rhinorrhea, sinus pressure, sinus pain, sore throat, tinnitus and voice change.    Respiratory:  Negative for chest tightness and shortness of breath.    Skin:  Negative for color change.   Neurological:  Negative for dizziness, numbness and headaches.   Psychiatric/Behavioral: Negative.           Objective:     Physical Exam  Constitutional:       Appearance: He is well-developed.   HENT:      Head: Normocephalic.      Right Ear: Hearing, tympanic membrane, ear canal and external ear normal. No decreased hearing noted. No drainage or tenderness. There is impacted cerumen. Tympanic membrane is not perforated or  erythematous.      Left Ear: Hearing, tympanic membrane, ear canal and external ear normal. No decreased hearing noted. No drainage or tenderness. There is impacted cerumen. Tympanic membrane is not perforated or erythematous.      Nose: Nose normal. No nasal deformity or septal deviation.      Mouth/Throat:      Mouth: Mucous membranes are not pale and not dry. No oral lesions.      Dentition: Normal dentition.      Pharynx: Uvula midline. No oropharyngeal exudate.   Neck:      Trachea: No tracheal deviation.   Pulmonary:      Effort: No accessory muscle usage or respiratory distress.   Musculoskeletal:      Cervical back: Neck supple.   Lymphadenopathy:      Cervical: No cervical adenopathy.   Skin:     General: Skin is warm and dry.   Neurological:      Mental Status: He is alert and oriented to person, place, and time.      Cranial Nerves: No cranial nerve deficit.      Sensory: No sensory deficit.   Psychiatric:         Behavior: Behavior is cooperative.         Ear cerumen removal    Date/Time: 10/15/2024 2:30 PM    Performed by: YARELY Blackwell  Authorized by: YARELY Blackwell  Universal Protocol:  procedure performed by consultantConsent: Verbal consent obtained.  Risks and benefits: risks, benefits and alternatives were discussed  Consent given by: patient  Patient understanding: patient states understanding of the procedure being performed    Patient location:  Clinic  Procedure details:     Local anesthetic:  None    Location:  L ear and R ear    Approach:  External  Post-procedure details:     Complication:  None    Hearing quality:  Normal    Patient tolerance of procedure:  Tolerated well, no immediate complications

## 2024-10-16 ENCOUNTER — OFFICE VISIT (OUTPATIENT)
Dept: PULMONOLOGY | Facility: MEDICAL CENTER | Age: 74
End: 2024-10-16
Payer: COMMERCIAL

## 2024-10-16 VITALS
RESPIRATION RATE: 12 BRPM | OXYGEN SATURATION: 94 % | HEIGHT: 61 IN | TEMPERATURE: 98.4 F | HEART RATE: 74 BPM | SYSTOLIC BLOOD PRESSURE: 122 MMHG | BODY MASS INDEX: 33.83 KG/M2 | WEIGHT: 179.2 LBS | DIASTOLIC BLOOD PRESSURE: 68 MMHG

## 2024-10-16 DIAGNOSIS — R93.89 ABNORMAL CT OF THE CHEST: Primary | ICD-10-CM

## 2024-10-16 DIAGNOSIS — J41.0 SIMPLE CHRONIC BRONCHITIS (HCC): ICD-10-CM

## 2024-10-16 DIAGNOSIS — J45.30 MILD PERSISTENT ASTHMA WITHOUT COMPLICATION: ICD-10-CM

## 2024-10-16 DIAGNOSIS — R06.09 DOE (DYSPNEA ON EXERTION): ICD-10-CM

## 2024-10-16 DIAGNOSIS — E66.2 HYPOVENTILATION ASSOCIATED WITH OBESITY SYNDROME (HCC): Chronic | ICD-10-CM

## 2024-10-16 PROCEDURE — 99214 OFFICE O/P EST MOD 30 MIN: CPT | Performed by: NURSE PRACTITIONER

## 2024-10-16 RX ORDER — BUDESONIDE AND FORMOTEROL FUMARATE DIHYDRATE 160; 4.5 UG/1; UG/1
2 AEROSOL RESPIRATORY (INHALATION) 2 TIMES DAILY
Qty: 30.6 G | Refills: 5 | Status: SHIPPED | OUTPATIENT
Start: 2024-10-16

## 2024-10-16 NOTE — ASSESSMENT & PLAN NOTE
Continues to have dyspnea on exertion.  Has lost at least 25 pounds in the past 8 months.  He did have a complete PFT was essentially negative a 2D echo that was essentially normal and also CT of chest.  It is possible that his weight is still an issue for this patient.  His O2 saturation at room air was approximately 95% when I walked patient 200 feet and when he sat down his O2 sat did go to 88% but quickly recovered to recover to at least 95%.  Patient was deferring of any supplemental oxygen with ambulation.  I am going to order an O2 pulse oximetry done on room air at nighttime

## 2024-10-16 NOTE — ASSESSMENT & PLAN NOTE
Patient had CT of chest done in 2023.  I reviewed images.  I also sent the images to interventional radiology.  He did have a tubular opacity in the right lower lobe extending laterally this was 5 mm that had been seen on prior CT of chest in October 2020.  Low this is likely related to a pulmonary vessel CT T angiogram will be done to rule out any vascular malformation.  Patient and his sister accompanies him today and is aware of why this is being done and are agreeable to the same.

## 2024-10-16 NOTE — ASSESSMENT & PLAN NOTE
Patient does have obesity and a crowded oral airway.  I am going to order nocturnal pulse oximetry to be done on room air.  Patient is agreeable that if in fact he qualifies he would consider wearing supplemental oxygen at nighttime.

## 2024-10-16 NOTE — ASSESSMENT & PLAN NOTE
Has been using Symbicort with great results.  He breathes better and has less of cough.  Needs rescue inhalation I am going to reorder his Symbicort this is 2 puffs 2 times a day he does rinse his mouth out after use

## 2024-10-16 NOTE — PROGRESS NOTES
Assessment/Plan:     Problem List Items Addressed This Visit          Respiratory    Simple chronic bronchitis (HCC) (Chronic)     Has been using Symbicort with great results.  He breathes better and has less of cough.  Needs rescue inhalation I am going to reorder his Symbicort this is 2 puffs 2 times a day he does rinse his mouth out after use         Relevant Medications    Symbicort 160-4.5 MCG/ACT inhaler       Other    LANE (dyspnea on exertion) (Chronic)     Continues to have dyspnea on exertion.  Has lost at least 25 pounds in the past 8 months.  He did have a complete PFT was essentially negative a 2D echo that was essentially normal and also CT of chest.  It is possible that his weight is still an issue for this patient.  His O2 saturation at room air was approximately 95% when I walked patient 200 feet and when he sat down his O2 sat did go to 88% but quickly recovered to recover to at least 95%.  Patient was deferring of any supplemental oxygen with ambulation.  I am going to order an O2 pulse oximetry done on room air at nighttime         Relevant Medications    Symbicort 160-4.5 MCG/ACT inhaler    Abnormal CT of the chest - Primary (Chronic)     Patient had CT of chest done in 2023.  I reviewed images.  I also sent the images to interventional radiology.  He did have a tubular opacity in the right lower lobe extending laterally this was 5 mm that had been seen on prior CT of chest in October 2020.  Low this is likely related to a pulmonary vessel CT T angiogram will be done to rule out any vascular malformation.  Patient and his sister accompanies him today and is aware of why this is being done and are agreeable to the same.         Relevant Orders    CTA chest pe study    Basic metabolic panel    Hypoventilation associated with obesity syndrome (HCC) (Chronic)     Patient does have obesity and a crowded oral airway.  I am going to order nocturnal pulse oximetry to be done on room air.  Patient is  "agreeable that if in fact he qualifies he would consider wearing supplemental oxygen at nighttime.         Relevant Orders    Pulse oximetry overnight     Other Visit Diagnoses       Mild persistent asthma without complication        Relevant Medications    Symbicort 160-4.5 MCG/ACT inhaler              Return in about 6 months (around 4/16/2025).  All questions are answered to the patient's satisfaction and understanding.  He verbalizes understanding.  He is encouraged to call with any further questions or concerns.    Portions of the record may have been created with voice recognition software.  Occasional wrong word or \"sound a like\" substitutions may have occurred due to the inherent limitations of voice recognition software.  Read the chart carefully and recognize, using context, where substitutions have occurred.    Electronically Signed by YARELY Quinn    ______________________________________________________________________    Chief Complaint: No chief complaint on file.      Patient ID: Larry is a 74 y.o. y.o. male has a past medical history of Arthritis, Chest pain (02/01/2006), Chronic pain, Closed fracture of one rib (07/27/2007), Colon polyp, COPD (chronic obstructive pulmonary disease) (HCC), Diabetes mellitus (HCC), Diverticulosis, Environmental allergies, GERD (gastroesophageal reflux disease), Headache(784.0), Hiatal hernia, HL (hearing loss), [place] (hard of hearing), Hyperlipidemia (01/05/2015), Hypertension, Migraine, Nasal congestion, Obesity, and Sebaceous cyst (01/11/2016).    10/16/2024  Patient presents today for follow-up visit.  HPI Larry is a 74-year-old male who has a history of diabetes mellitus chronic kidney disease and also shortness of breath.  He does have obesity with body mass index of 33.86.  He was seen by pulmonology in March 2023 for shortness of breath with exertion.  He is a lifelong non-smoker.  Have pulmonary function test in September 2020 that were normal.  It " was unclear etiology of whether of why he was short of breath with exertion causes could include generalized deconditioning asthma or cardiac etiology it was suggested that he have a repeat CT of chest repeat pulmonary function test and Symbicort  Patient did have a CT of chest without IV contrast in June 2023 there was minimal Rodrigo fissural nodularity on the right that was stable no new suspicious nodules or masses.  There was tubular opacity in the right lower lobe extending laterally.  This was seen on prior CT of chest in October 2020 this may be related to pulmonary vessel and associated 5 mm nodular density in the peripheral aspect.  Patiently he did have a complete pulmonary function test this was done in May 2023.  Forced vital capacity was 2.88 L or 94% of predicted, FEV1 was 2.10 L or 89% obstruction ratio was 73 total lung capacity was normal at 81 residual volume 80 and diffusion capacity was 87%.  From a tree was normal lung volumes were normal diffusion capacity was normal.  Also had a 2D echocardiogram in May 2023 ejection fraction was 61st percent he had grade 1 diastolic dysfunction and mild regurgitation of both mitral and tricuspid valveAnswers submitted by the patient for this visit:  Pulmonology Questionnaire (Submitted on 10/16/2024)  Chief Complaint: Primary symptoms  Do you have difficulty breathing?: Yes  Do you have a hoarse voice?: Yes  When did you first notice your symptoms?: more than 1 year ago  How often do your symptoms occur?: every several days  Since you first noticed this problem, how has it changed?: gradually improving  Do you have shortness of breath that occurs with effort or exertion?: Yes  Which of the following makes your symptoms worse?: change in weather, climbing stairs, exercise  Which of the following makes your symptoms better?: prescription cough suppressant, steroid inhaler  Risk factors for lung disease: smoking/tobacco exposure  Answers submitted by the patient  for this visit:  Pulmonology Questionnaire (Submitted on 10/16/2024)  Chief Complaint: Primary symptoms  Do you have difficulty breathing?: Yes  Do you have a hoarse voice?: Yes  When did you first notice your symptoms?: more than 1 year ago  How often do your symptoms occur?: every several days  Since you first noticed this problem, how has it changed?: gradually improving  Do you have shortness of breath that occurs with effort or exertion?: Yes  Which of the following makes your symptoms worse?: change in weather, climbing stairs, exercise  Which of the following makes your symptoms better?: prescription cough suppressant, steroid inhaler  Risk factors for lung disease: smoking/tobacco exposure      Review of Systems    Smoking history: He reports that he has never smoked. He has been exposed to tobacco smoke. He has never used smokeless tobacco.    The following portions of the patient's history were reviewed and updated as appropriate: allergies, past family history, past medical history, past social history, past surgical history, and problem list.    Immunization History   Administered Date(s) Administered    COVID-19 MODERNA VACC 0.25 ML IM BOOSTER 11/23/2021    COVID-19 MODERNA VACC 0.5 ML IM 03/17/2021, 04/16/2021    COVID-19 Moderna Vac BIVALENT 12 Yr+ IM 0.5 ML 10/10/2022    Tuberculin Skin Test-PPD Intradermal 11/05/2019     Current Outpatient Medications   Medication Sig Dispense Refill    acetaminophen (TYLENOL) 650 mg CR tablet Take 650 mg by mouth as needed daily      Ascorbic Acid (VITAMIN C) 500 MG CAPS Take by mouth 2 (two) times a day       aspirin (ECOTRIN LOW STRENGTH) 81 mg EC tablet Take 81 mg by mouth if needed      atorvastatin (LIPITOR) 10 mg tablet Take 1 tablet (10 mg total) by mouth daily 90 tablet 1    Cyanocobalamin (VITAMIN B 12 PO) Take by mouth      fluticasone (FLONASE) 50 mcg/act nasal spray 1 spray into each nostril daily      gabapentin (NEURONTIN) 300 mg capsule Take 1  "capsule (300 mg total) by mouth 2 (two) times a day 180 capsule 1    Magnesium Oxide 400 MG CAPS Take 400 mg by mouth 2 (two) times a day       metFORMIN (GLUCOPHAGE-XR) 500 mg 24 hr tablet TAKE 1 TABLET BY MOUTH EVERY DAY WITH DINNER 90 tablet 1    montelukast (SINGULAIR) 10 mg tablet TAKE 1 TABLET (10 MG TOTAL) BY MOUTH DAILY WITH BREAKFAST 90 tablet 1    Multiple Vitamins-Minerals (PRESERVISION AREDS PO) Take by mouth daily      omega-3-acid ethyl esters (LOVAZA) 1 g capsule TAKE 2 CAPSULES BY MOUTH TWICE A  capsule 1    omeprazole (PriLOSEC OTC) 20 MG tablet Take 20 mg by mouth daily      propranolol (INDERAL LA) 160 mg TAKE 1 CAPSULE BY MOUTH EVERY DAY 90 capsule 1    ramipril (ALTACE) 10 MG capsule TAKE 2 CAPSULES BY MOUTH EVERY  capsule 1    semaglutide, 1 mg/dose, (Ozempic) 4 mg/3 mL injection pen Inject 0.75 mL (1 mg total) under the skin once a week 9 mL 1    SUMAtriptan (IMITREX) 100 mg tablet TAKE 1 TABLET BY MOUTH ONCE AS NEEDED FOR MIGRAINE 9 tablet 1    Symbicort 160-4.5 MCG/ACT inhaler Inhale 2 puffs 2 (two) times a day Rinse mouth after use 30.6 g 5    verapamil (CALAN-SR) 240 mg CR tablet TAKE 1 TABLET (240 MG TOTAL) BY MOUTH DAILY AT BEDTIME 90 tablet 1    albuterol (PROVENTIL HFA,VENTOLIN HFA) 90 mcg/act inhaler       nystatin-triamcinolone (MYCOLOG-II) cream Apply topically 2 (two) times a day Sparingly (short term) to affected area: right arm 60 g 0     No current facility-administered medications for this visit.     Allergies: Other    Objective:  Vitals:    10/16/24 1454   BP: 122/68   BP Location: Left arm   Patient Position: Sitting   Cuff Size: Extra-Large   Pulse: 74   Resp: 12   Temp: 98.4 °F (36.9 °C)   TempSrc: Temporal   SpO2: 94%   Weight: 81.3 kg (179 lb 3.2 oz)   Height: 5' 1\" (1.549 m)   Oxygen Therapy  SpO2: 94 %  .  Wt Readings from Last 3 Encounters:   10/16/24 81.3 kg (179 lb 3.2 oz)   10/15/24 80.3 kg (177 lb)   08/06/24 80.5 kg (177 lb 6.4 oz)     Body mass " index is 33.86 kg/m².    Physical Exam    Lab Review:   Orders Only on 08/02/2024   Component Date Value    Hemoglobin A1C 08/02/2024 9.2        Past Surgical History:   Procedure Laterality Date    COLONOSCOPY      EGD AND COLONOSCOPY      WISDOM TOOTH EXTRACTION          Family History   Problem Relation Age of Onset    Arthritis Mother     COPD Mother     Hypertension Mother     Lung cancer Father     Asthma Brother     Cancer Brother         colon    Diabetes Sister     Hypertension Sister     Stomach cancer Brother     Asthma Brother         Diagnostics:    none pertinentAnswers submitted by the patient for this visit:  Pulmonology Questionnaire (Submitted on 10/16/2024)  Chief Complaint: Primary symptoms  Do you have difficulty breathing?: Yes  Do you have a hoarse voice?: Yes  When did you first notice your symptoms?: more than 1 year ago  How often do your symptoms occur?: every several days  Since you first noticed this problem, how has it changed?: gradually improving  Do you have shortness of breath that occurs with effort or exertion?: Yes  Which of the following makes your symptoms worse?: change in weather, climbing stairs, exercise  Which of the following makes your symptoms better?: prescription cough suppressant, steroid inhaler  Risk factors for lung disease: smoking/tobacco exposure    Office Spirometry Results:     ESS:    No results found.

## 2024-10-17 LAB
DME PARACHUTE DELIVERY DATE REQUESTED: NORMAL
DME PARACHUTE ITEM DESCRIPTION: NORMAL
DME PARACHUTE ORDER STATUS: NORMAL
DME PARACHUTE SUPPLIER NAME: NORMAL
DME PARACHUTE SUPPLIER PHONE: NORMAL

## 2024-10-28 ENCOUNTER — TELEPHONE (OUTPATIENT)
Age: 74
End: 2024-10-28

## 2024-10-28 NOTE — TELEPHONE ENCOUNTER
Incoming call by patient's sister:    Requesting that we communicate with pharmacy regarding Symbicort. Will call after 2pm when they come back from lunch.    __________________________________________________    Outgoing call to pharmacy:    States that the Symbicort requires prior auth- Initiating in separate encounter.

## 2024-10-29 NOTE — TELEPHONE ENCOUNTER
I have attempted twice to submit a PA and received this message. Please update pt's pharmacy benefits information.

## 2024-11-04 DIAGNOSIS — J45.30 MILD PERSISTENT ASTHMA WITHOUT COMPLICATION: Primary | ICD-10-CM

## 2024-11-04 RX ORDER — FLUTICASONE PROPIONATE AND SALMETEROL 250; 50 UG/1; UG/1
1 POWDER RESPIRATORY (INHALATION) 2 TIMES DAILY
Qty: 60 BLISTER | Refills: 2 | Status: SHIPPED | OUTPATIENT
Start: 2024-11-04

## 2024-11-04 NOTE — TELEPHONE ENCOUNTER
Pt Sister (Olivia) states that he has insurance for about 30 years and they have never had a problem before with it. She called the insurance [facility] and confirmed that in fact that he does in deed have coverage.    Regarding the Symbicort: States can get generic if easier.   [facility]- [phone]     [facility]- [phone] (script line)

## 2024-11-04 NOTE — TELEPHONE ENCOUNTER
I called pharmacy to verify prescription coverage benefits. It does match what is in the chart. I submitted a PA on [facility] and received this message. I called pt and left a VM informing them

## 2024-11-04 NOTE — TELEPHONE ENCOUNTER
Pt's sister Olivia calling in and states she has no update on what going on with pt's Symbicort.Informed her looks like PA could not be processed for some reason with the insurance information we have. She states information we have is correct so she not sure where the problem is. I informed her that we are going to be in touch with pharmacy and insurance to see how we can fix this. She also states that if its going to be a hassle to just send in the generic. Olivia is requesting a call back today in regards to this she states she has been waiting to get this for a while now.

## 2024-11-05 NOTE — TELEPHONE ENCOUNTER
Incoming call by patient's sister:    Would like to let YARELY Robertson know that Larry successfully received his inhaler and thanks her for all her hard work. Routing.

## 2024-11-21 DIAGNOSIS — E78.1 HIGH TRIGLYCERIDES: ICD-10-CM

## 2024-11-21 RX ORDER — OMEGA-3-ACID ETHYL ESTERS 1 G/1
2 CAPSULE, LIQUID FILLED ORAL 2 TIMES DAILY
Qty: 360 CAPSULE | Refills: 1 | Status: SHIPPED | OUTPATIENT
Start: 2024-11-21

## 2024-11-22 ENCOUNTER — TELEPHONE (OUTPATIENT)
Age: 74
End: 2024-11-22

## 2024-11-24 DIAGNOSIS — I10 BENIGN ESSENTIAL HYPERTENSION: ICD-10-CM

## 2024-11-25 RX ORDER — RAMIPRIL 10 MG/1
20 CAPSULE ORAL DAILY
Qty: 180 CAPSULE | Refills: 1 | Status: SHIPPED | OUTPATIENT
Start: 2024-11-25

## 2024-11-26 ENCOUNTER — OFFICE VISIT (OUTPATIENT)
Age: 74
End: 2024-11-26
Payer: COMMERCIAL

## 2024-11-26 ENCOUNTER — APPOINTMENT (OUTPATIENT)
Dept: LAB | Facility: CLINIC | Age: 74
End: 2024-11-26
Payer: COMMERCIAL

## 2024-11-26 ENCOUNTER — TRANSCRIBE ORDERS (OUTPATIENT)
Dept: LAB | Facility: CLINIC | Age: 74
End: 2024-11-26

## 2024-11-26 VITALS
HEIGHT: 61 IN | DIASTOLIC BLOOD PRESSURE: 74 MMHG | SYSTOLIC BLOOD PRESSURE: 110 MMHG | BODY MASS INDEX: 33.42 KG/M2 | WEIGHT: 177 LBS | HEART RATE: 78 BPM

## 2024-11-26 DIAGNOSIS — N18.2 TYPE 2 DIABETES MELLITUS WITH STAGE 2 CHRONIC KIDNEY DISEASE, WITHOUT LONG-TERM CURRENT USE OF INSULIN  (HCC): ICD-10-CM

## 2024-11-26 DIAGNOSIS — I10 BENIGN ESSENTIAL HYPERTENSION: ICD-10-CM

## 2024-11-26 DIAGNOSIS — R93.89 ABNORMAL CT OF THE CHEST: ICD-10-CM

## 2024-11-26 DIAGNOSIS — N18.6 TYPE 2 DIABETES MELLITUS WITH ESRD (END-STAGE RENAL DISEASE) (HCC): Primary | ICD-10-CM

## 2024-11-26 DIAGNOSIS — Z12.5 PROSTATE CANCER SCREENING: ICD-10-CM

## 2024-11-26 DIAGNOSIS — R93.89 ABNORMAL RADIOLOGICAL FINDINGS IN SKIN AND SUBCUTANEOUS TISSUE: ICD-10-CM

## 2024-11-26 DIAGNOSIS — E11.22 TYPE 2 DIABETES MELLITUS WITH STAGE 2 CHRONIC KIDNEY DISEASE, WITHOUT LONG-TERM CURRENT USE OF INSULIN  (HCC): ICD-10-CM

## 2024-11-26 DIAGNOSIS — E78.49 OTHER HYPERLIPIDEMIA: ICD-10-CM

## 2024-11-26 DIAGNOSIS — G43.009 MIGRAINE WITHOUT AURA AND WITHOUT STATUS MIGRAINOSUS, NOT INTRACTABLE: Primary | ICD-10-CM

## 2024-11-26 DIAGNOSIS — E11.22 TYPE 2 DIABETES MELLITUS WITH ESRD (END-STAGE RENAL DISEASE) (HCC): Primary | ICD-10-CM

## 2024-11-26 DIAGNOSIS — N18.2: ICD-10-CM

## 2024-11-26 DIAGNOSIS — E13.22: ICD-10-CM

## 2024-11-26 LAB
ALBUMIN SERPL BCG-MCNC: 4.3 G/DL (ref 3.5–5)
ALP SERPL-CCNC: 151 U/L (ref 34–104)
ALT SERPL W P-5'-P-CCNC: 72 U/L (ref 7–52)
ANION GAP SERPL CALCULATED.3IONS-SCNC: 8 MMOL/L (ref 4–13)
AST SERPL W P-5'-P-CCNC: 46 U/L (ref 13–39)
BILIRUB SERPL-MCNC: 0.49 MG/DL (ref 0.2–1)
BUN SERPL-MCNC: 19 MG/DL (ref 5–25)
CALCIUM SERPL-MCNC: 9.1 MG/DL (ref 8.4–10.2)
CHLORIDE SERPL-SCNC: 101 MMOL/L (ref 96–108)
CHOLEST SERPL-MCNC: 208 MG/DL (ref ?–200)
CO2 SERPL-SCNC: 25 MMOL/L (ref 21–32)
CREAT SERPL-MCNC: 0.94 MG/DL (ref 0.6–1.3)
CREAT UR-MCNC: 77 MG/DL
EST. AVERAGE GLUCOSE BLD GHB EST-MCNC: 220 MG/DL
GFR SERPL CREATININE-BSD FRML MDRD: 80 ML/MIN/1.73SQ M
GLUCOSE P FAST SERPL-MCNC: 224 MG/DL (ref 65–99)
HBA1C MFR BLD: 9.3 %
HDLC SERPL-MCNC: 40 MG/DL
LDLC SERPL DIRECT ASSAY-MCNC: 117 MG/DL (ref 0–100)
MICROALBUMIN UR-MCNC: 9.9 MG/L
MICROALBUMIN/CREAT 24H UR: 13 MG/G CREATININE (ref 0–30)
POTASSIUM SERPL-SCNC: 5.1 MMOL/L (ref 3.5–5.3)
PROT SERPL-MCNC: 6.7 G/DL (ref 6.4–8.4)
PSA SERPL-MCNC: 3.54 NG/ML (ref 0–4)
SODIUM SERPL-SCNC: 134 MMOL/L (ref 135–147)
TRIGL SERPL-MCNC: 502 MG/DL (ref ?–150)

## 2024-11-26 PROCEDURE — G2211 COMPLEX E/M VISIT ADD ON: HCPCS | Performed by: NURSE PRACTITIONER

## 2024-11-26 PROCEDURE — 83721 ASSAY OF BLOOD LIPOPROTEIN: CPT

## 2024-11-26 PROCEDURE — 80061 LIPID PANEL: CPT

## 2024-11-26 PROCEDURE — 36415 COLL VENOUS BLD VENIPUNCTURE: CPT

## 2024-11-26 PROCEDURE — 83036 HEMOGLOBIN GLYCOSYLATED A1C: CPT

## 2024-11-26 PROCEDURE — 99213 OFFICE O/P EST LOW 20 MIN: CPT | Performed by: NURSE PRACTITIONER

## 2024-11-26 PROCEDURE — 82043 UR ALBUMIN QUANTITATIVE: CPT

## 2024-11-26 PROCEDURE — G0103 PSA SCREENING: HCPCS

## 2024-11-26 PROCEDURE — 80053 COMPREHEN METABOLIC PANEL: CPT

## 2024-11-26 PROCEDURE — 82570 ASSAY OF URINE CREATININE: CPT

## 2024-11-26 NOTE — PATIENT INSTRUCTIONS
Continue with Verapamil 240mg daily at bedtime per PCP orders  Continue with propranolol 160mg daily   Can take sumatriptan at the onset of migraine activity  Increase oral hydration, DRINK MORE WATER   Follow up with Neurology in 1yr or sooner if needed.

## 2024-11-26 NOTE — PROGRESS NOTES
Name: Larry Aragon      : 1950      MRN: [id number]  Encounter Provider: YARELY Hanley  Encounter Date: 2024   Encounter department: [facility]    :  Assessment & Plan  Migraine without aura and without status migrainosus, not intractable  Continue verapamil 240 mg daily at bedtime per PCP  Continue propranolol 160 mg daily  Can use sumatriptan at onset of migraine headache   Increase oral hydration       Benign essential hypertension  Continue verapamil 240 mg daily at bedtime per PCP  Continue propranolol 160 mg daily  Increase oral hydration         Patient Instructions   Continue with Verapamil 240mg daily at bedtime per PCP orders  Continue with propranolol 160mg daily   Can take sumatriptan at the onset of migraine activity  Increase oral hydration, DRINK MORE WATER   Follow up with Neurology in 1yr or sooner if needed.    History of Present Illness   Larry Aragon is a 73yo male who follows with outpatient neurology for management of his migraines.  Patient has been on propranolol 160 mg daily as well as verapamil 180 mg at bedtime for prevention.  He takes Imitrex with baby aspirin for breakthrough migraine activity.  He had previously reported approximately 1 migraine headache per month, gets increased activity due to severe weather.  Patient will get blurred vision at that time however feels this is mostly related to cataracts rather than his migraines.  Patient has been seen by ophthalmology, has been doing well since he received new eyeglass prescriptions.  Patient's typical migraines start in the left posterior portion of his head, notes that when the migraine is really bad it will radiate to the left eyeball which happens approximately 1 time per month.  Last office appointment patient reported minimal migraine activity, although noted a slight bump to 3 migraines per month.  Patient felt this was related to weather changes.  He  tends to take sumatriptan more during the fall in the spring months.  He does not complain or report migraines to the sister who assist in his care however she does track this by the numbers.  Triptan refills she has for him.  Patient states when he takes the sumatriptan it is effective for him relatively quickly.  He tolerates it well.  Patient has had ongoing issues with hypertension, his PCP increased him to verapamil 240 mg daily.  He tolerates the blood pressure adjustments well.  BP has been normotensive during his evaluations at neurology.  Patient does report having some postural lightheadedness, states is very random and not all the time.  Sister indicates that he does not hydrate well, drinks approximately 8 ounces of water per day.  Encourage patient to increase his oral hydration, could also use compression socks.    Patient reports back to neurology office today, he is accompanied by his sister who assists in his care.  They report that he has had approximately 3-4 migraine headaches per month.  Patient sister keeps track of his Imitrex in order to know how many migraines he has been having.  Patient states the Imitrex continues to work well for his abortive measure, takes about 1/2-hour in order to alleviate his headache activity.  Patient has had an increase in his activity due to the labile weather recently, he does take daily allergy medication and notes when he misses that he has increased congestion and is symptomatic.  Regarding patient's positional lightheadedness, states that he has not had any issues with that recently.  His systolic pressure today is 110.  He remains on verapamil 240 mg daily at bedtime as well as propranolol 160 mg daily.  Tolerating medication regimen well and it appears to be holding off his migraine activity.  With regards to positional dizziness advised patient that he may need to increase his oral intake as he only drinks approximately 8 ounces of water a day.   Patient's sister indicated that his A1c's have been consistently high as well and was started on Ozempic about a year ago.  She states with the use of the Ozempic, she has been trying to encourage him to increase his water intake however he is very resistant.  Encourage patient as well to increase his oral hydration in order to help his cells work and function better to decrease the incidence of possible headaches.  Patient is doing well on his current regimen, no changes are being made to his meds at this time.  Patient will follow-up in the outpatient neurology office in 1 year or sooner if needed.      Review of Systems   Constitutional:  Negative for chills and fever.   HENT:  Negative for ear pain and sore throat.    Eyes:  Negative for pain and visual disturbance.   Respiratory:  Negative for cough and shortness of breath.    Cardiovascular:  Negative for chest pain and palpitations.   Gastrointestinal:  Negative for abdominal pain and vomiting.   Genitourinary:  Negative for dysuria and hematuria.   Musculoskeletal:  Negative for arthralgias and back pain.   Skin:  Negative for color change and rash.   Neurological:  Positive for light-headedness and headaches. Negative for seizures and syncope.   All other systems reviewed and are negative.    I have personally reviewed the MA's review of systems and made changes as necessary.        Past Medical History:   Diagnosis Date    Arthritis     Chest pain 02/01/2006    Chronic pain     lower back    Closed fracture of one rib 07/27/2007    Colon polyp     COPD (chronic obstructive pulmonary disease) (HCC)     Diabetes mellitus (HCC)     boarderline diabetes    Diverticulosis     Environmental allergies     GERD (gastroesophageal reflux disease)     Headache(784.0)     Hiatal hernia     HL (hearing loss)     [place] (hard of hearing)     uses hearing aides    Hyperlipidemia 01/05/2015    Hypertension     Migraine     Nasal congestion     Obesity     Sebaceous cyst  01/11/2016       Past Surgical History:   Procedure Laterality Date    COLONOSCOPY      EGD AND COLONOSCOPY      WISDOM TOOTH EXTRACTION         Social History     Socioeconomic History    Marital status: Single     Spouse name: None    Number of children: None    Years of education: None    Highest education level: None   Occupational History    None   Tobacco Use    Smoking status: Never     Passive exposure: Past    Smokeless tobacco: Never    Tobacco comments:     from infancy    Vaping Use    Vaping status: Never Used   Substance and Sexual Activity    Alcohol use: Yes     Alcohol/week: 2.0 standard drinks of alcohol     Types: 2 Glasses of wine per week     Comment: social    Drug use: No    Sexual activity: None   Other Topics Concern    None   Social History Narrative    None     Social Drivers of Health     Financial Resource Strain: Low Risk  (1/3/2024)    Overall Financial Resource Strain (CARDIA)     Difficulty of Paying Living Expenses: Not hard at all   Food Insecurity: Not on file   Transportation Needs: No Transportation Needs (1/3/2024)    PRAPARE - Transportation     Lack of Transportation (Medical): No     Lack of Transportation (Non-Medical): No   Physical Activity: Not on file   Stress: Not on file   Social Connections: Not on file   Intimate Partner Violence: Not on file   Housing Stability: Not on file       Family History   Problem Relation Age of Onset    Arthritis Mother     COPD Mother     Hypertension Mother     Lung cancer Father     Asthma Brother     Cancer Brother         colon    Diabetes Sister     Hypertension Sister     Stomach cancer Brother     Asthma Brother          Current Outpatient Medications:     acetaminophen (TYLENOL) 650 mg CR tablet, Take 650 mg by mouth as needed daily, Disp: , Rfl:     Ascorbic Acid (VITAMIN C) 500 MG CAPS, Take by mouth 2 (two) times a day , Disp: , Rfl:     aspirin (ECOTRIN LOW STRENGTH) 81 mg EC tablet, Take 81 mg by mouth if needed, Disp: , Rfl:      atorvastatin (LIPITOR) 10 mg tablet, Take 1 tablet (10 mg total) by mouth daily, Disp: 90 tablet, Rfl: 1    Cyanocobalamin (VITAMIN B 12 PO), Take by mouth, Disp: , Rfl:     fluticasone (FLONASE) 50 mcg/act nasal spray, 1 spray into each nostril daily, Disp: , Rfl:     Fluticasone-Salmeterol (Wixela Inhub) 250-50 mcg/dose inhaler, Inhale 1 puff 2 (two) times a day Rinse mouth after use., Disp: 60 blister, Rfl: 2    gabapentin (NEURONTIN) 300 mg capsule, Take 1 capsule (300 mg total) by mouth 2 (two) times a day, Disp: 180 capsule, Rfl: 1    Magnesium Oxide 400 MG CAPS, Take 400 mg by mouth 2 (two) times a day , Disp: , Rfl:     metFORMIN (GLUCOPHAGE-XR) 500 mg 24 hr tablet, TAKE 1 TABLET BY MOUTH EVERY DAY WITH DINNER, Disp: 90 tablet, Rfl: 1    montelukast (SINGULAIR) 10 mg tablet, TAKE 1 TABLET (10 MG TOTAL) BY MOUTH DAILY WITH BREAKFAST, Disp: 90 tablet, Rfl: 1    Multiple Vitamins-Minerals (PRESERVISION AREDS PO), Take by mouth daily, Disp: , Rfl:     omega-3-acid ethyl esters (LOVAZA) 1 g capsule, TAKE 2 CAPSULES BY MOUTH TWICE A DAY, Disp: 360 capsule, Rfl: 1    omeprazole (PriLOSEC OTC) 20 MG tablet, Take 20 mg by mouth daily, Disp: , Rfl:     propranolol (INDERAL LA) 160 mg, TAKE 1 CAPSULE BY MOUTH EVERY DAY, Disp: 90 capsule, Rfl: 1    ramipril (ALTACE) 10 MG capsule, TAKE 2 CAPSULES BY MOUTH EVERY DAY, Disp: 180 capsule, Rfl: 1    semaglutide, 1 mg/dose, (Ozempic) 4 mg/3 mL injection pen, Inject 0.75 mL (1 mg total) under the skin once a week, Disp: 9 mL, Rfl: 1    SUMAtriptan (IMITREX) 100 mg tablet, TAKE 1 TABLET BY MOUTH ONCE AS NEEDED FOR MIGRAINE, Disp: 9 tablet, Rfl: 1    verapamil (CALAN-SR) 240 mg CR tablet, TAKE 1 TABLET (240 MG TOTAL) BY MOUTH DAILY AT BEDTIME, Disp: 90 tablet, Rfl: 1    albuterol (PROVENTIL HFA,VENTOLIN HFA) 90 mcg/act inhaler, , Disp: , Rfl:     nystatin-triamcinolone (MYCOLOG-II) cream, Apply topically 2 (two) times a day Sparingly (short term) to affected area: right arm,  "Disp: 60 g, Rfl: 0    Symbicort 160-4.5 MCG/ACT inhaler, Inhale 2 puffs 2 (two) times a day Rinse mouth after use (Patient not taking: Reported on 11/26/2024), Disp: 30.6 g, Rfl: 5    Allergies   Allergen Reactions    Other Sneezing     SEASONAL   Other reaction(s): Sneezing  SEASONAL         Blood pressure 110/74, pulse 78, height 5' 1\" (1.549 m), weight 80.3 kg (177 lb).       Objective   There were no vitals taken for this visit.    Physical Exam  Vitals reviewed.   Constitutional:       Appearance: Normal appearance. He is well-developed.   HENT:      Head: Normocephalic.      Right Ear: Hearing normal.      Left Ear: Hearing normal.      Nose: Nose normal.      Mouth/Throat:      Mouth: Mucous membranes are moist.   Eyes:      General: Lids are normal.      Extraocular Movements: EOM normal.      Conjunctiva/sclera: Conjunctivae normal.      Pupils: Pupils are equal, round, and reactive to light.   Pulmonary:      Effort: Pulmonary effort is normal. No respiratory distress.   Abdominal:      Palpations: Abdomen is soft.      Tenderness: There is no abdominal tenderness.   Musculoskeletal:         General: Normal range of motion.      Cervical back: Normal range of motion.   Skin:     General: Skin is warm and dry.   Neurological:      Mental Status: He is alert and oriented to person, place, and time.      Cranial Nerves: Cranial nerves 2-12 are intact.      Motor: Motor strength is normal.     Coordination: Finger-Nose-Finger Test and Romberg Test normal.      Gait: Gait is intact.   Psychiatric:         Attention and Perception: Attention and perception normal.         Mood and Affect: Mood and affect normal.         Speech: Speech normal.         Behavior: Behavior normal. Behavior is cooperative.         Thought Content: Thought content normal.         Cognition and Memory: Cognition and memory normal.         Judgment: Judgment normal.       Neurologic Exam     Mental Status   Oriented to person, place, " and time.   Speech: speech is normal   Level of consciousness: alert  Knowledge: good.     Cranial Nerves   Cranial nerves II through XII intact.     CN II   Visual fields full to confrontation.     CN III, IV, VI   Pupils are equal, round, and reactive to light.  Extraocular motions are normal.   CN III: no CN III palsy  CN VI: no CN VI palsy    CN V   Facial sensation intact.     CN VII   Facial expression full, symmetric.     CN VIII   CN VIII normal.     CN IX, X   CN IX normal.     CN XI   CN XI normal.     CN XII   CN XII normal.     Motor Exam   Muscle bulk: normal  Overall muscle tone: normal  Right arm pronator drift: absent  Left arm pronator drift: absent  Right leg tone: normal  Left leg tone: normal    Strength   Strength 5/5 throughout.     Sensory Exam   Light touch normal.   Vibration normal.   Proprioception normal.     Gait, Coordination, and Reflexes     Gait  Gait: normal    Coordination   Romberg: negative  Finger to nose coordination: normal    Tremor   Resting tremor: absent  Intention tremor: absent  Action tremor: absent    Reflexes   Reflexes 2+ except as noted.   Right plantar: normal  Left plantar: normal  Right Cruz: absent  Left Cruz: absent      Results/Data:  I have reviewed the results and images from the recent blood work although results not available.       Administrative Statements   I have spent a total time of 25 minutes in caring for this patient on the day of the visit/encounter including Risks and benefits of tx options, Instructions for management, Patient and family education, Counseling / Coordination of care, Documenting in the medical record, Reviewing / ordering tests, medicine, procedures  , and Obtaining or reviewing history  .

## 2024-11-26 NOTE — ASSESSMENT & PLAN NOTE
Continue verapamil 240 mg daily at bedtime per PCP  Continue propranolol 160 mg daily  Can use sumatriptan at onset of migraine headache   Increase oral hydration

## 2024-11-26 NOTE — ASSESSMENT & PLAN NOTE
Continue verapamil 240 mg daily at bedtime per PCP  Continue propranolol 160 mg daily  Increase oral hydration

## 2024-11-27 ENCOUNTER — RESULTS FOLLOW-UP (OUTPATIENT)
Dept: FAMILY MEDICINE CLINIC | Facility: CLINIC | Age: 74
End: 2024-11-27

## 2024-12-03 ENCOUNTER — OFFICE VISIT (OUTPATIENT)
Dept: FAMILY MEDICINE CLINIC | Facility: CLINIC | Age: 74
End: 2024-12-03
Payer: COMMERCIAL

## 2024-12-03 VITALS
BODY MASS INDEX: 33.61 KG/M2 | HEIGHT: 61 IN | RESPIRATION RATE: 16 BRPM | DIASTOLIC BLOOD PRESSURE: 80 MMHG | HEART RATE: 74 BPM | SYSTOLIC BLOOD PRESSURE: 120 MMHG | WEIGHT: 178 LBS | TEMPERATURE: 97.9 F

## 2024-12-03 DIAGNOSIS — I10 BENIGN ESSENTIAL HYPERTENSION: ICD-10-CM

## 2024-12-03 DIAGNOSIS — Z12.11 COLON CANCER SCREENING: ICD-10-CM

## 2024-12-03 DIAGNOSIS — E78.49 OTHER HYPERLIPIDEMIA: ICD-10-CM

## 2024-12-03 DIAGNOSIS — Z86.0109 PERSONAL HISTORY OF OTHER COLON POLYPS: ICD-10-CM

## 2024-12-03 DIAGNOSIS — E66.9 OBESITY (BMI 30-39.9): ICD-10-CM

## 2024-12-03 DIAGNOSIS — E11.22 TYPE 2 DIABETES MELLITUS WITH STAGE 2 CHRONIC KIDNEY DISEASE, WITHOUT LONG-TERM CURRENT USE OF INSULIN  (HCC): Primary | ICD-10-CM

## 2024-12-03 DIAGNOSIS — N18.2 TYPE 2 DIABETES MELLITUS WITH STAGE 2 CHRONIC KIDNEY DISEASE, WITHOUT LONG-TERM CURRENT USE OF INSULIN  (HCC): Primary | ICD-10-CM

## 2024-12-03 PROBLEM — E66.01 CLASS 2 SEVERE OBESITY DUE TO EXCESS CALORIES WITH SERIOUS COMORBIDITY AND BODY MASS INDEX (BMI) OF 35.0 TO 35.9 IN ADULT (HCC): Status: RESOLVED | Noted: 2020-09-01 | Resolved: 2024-12-03

## 2024-12-03 PROBLEM — E66.812 CLASS 2 SEVERE OBESITY DUE TO EXCESS CALORIES WITH SERIOUS COMORBIDITY AND BODY MASS INDEX (BMI) OF 35.0 TO 35.9 IN ADULT (HCC): Status: RESOLVED | Noted: 2020-09-01 | Resolved: 2024-12-03

## 2024-12-03 PROCEDURE — 99214 OFFICE O/P EST MOD 30 MIN: CPT | Performed by: FAMILY MEDICINE

## 2024-12-03 PROCEDURE — G2211 COMPLEX E/M VISIT ADD ON: HCPCS | Performed by: FAMILY MEDICINE

## 2024-12-03 NOTE — PATIENT INSTRUCTIONS
We can increase the ozempic to the max dose of 2mg per week but if his fasting sugars are checked and remain over 200 after 1 month, we could add jardiance or farxiga.    TEST STRIPS for Diabetes monitoring can be prescribed to your pharmacy.  Since the test strips are unique to the BRAND of the glucose meter, it is in your best economic interest to find out the SPECIFIC BRAND and TYPE of test strip covered by your insurance company.  You can find this out from your insurance company or participating pharmacy.  Once you obtain this information, we can prescribe the specific branded test strip of your choosing.

## 2024-12-03 NOTE — PROGRESS NOTES
Assessment/Plan:    No problem-specific Assessment & Plan notes found for this encounter.    DM2 not at goal  Not much better after ozempic increase to 1mg/w  Increase to 2mg/w  If fbs elevated over next 30d by report, then will offer dosage increase of metformin next  SGLTi options discussed also  They will work more on diet    Htn stable    Migraines stable    Bmi aware  Suggest wt loss    Lab Results   Component Value Date    HGBA1C 9.3 (H) 11/26/2024    HGBA1C 9.2 08/02/2024    HGBA1C 8.5 (H) 04/12/2024     Lab Results   Component Value Date    GLUF 224 (H) 11/26/2024    LDLCALC  11/26/2024      Comment:      Calculated LDL invalid, triglycerides >400 mg/dl    CREATININE 0.94 11/26/2024          Diagnoses and all orders for this visit:    Type 2 diabetes mellitus with stage 2 chronic kidney disease, without long-term current use of insulin  (HCC)  -     Comprehensive metabolic panel; Future  -     Hemoglobin A1C; Future  -     Comprehensive metabolic panel  -     Hemoglobin A1C  -     semaglutide, 2 mg/dose, (Ozempic) 8 mg/ mL injection pen; Inject 0.75 mL (2 mg total) under the skin every 7 days    Colon cancer screening    Personal history of other colon polyps  -     Ambulatory referral to Gastroenterology; Future    Benign essential hypertension    Other hyperlipidemia    Obesity (BMI 30-39.9)    BMI 33.0-33.9,adult      Return in about 13 weeks (around 3/4/2025).    Subjective:      Patient ID: Larry Aragon is a 74 y.o. male.    Chief Complaint   Patient presents with    Follow-up     4 month follow up cmavp        HPI  Eats somewhat less  Bad food choices, pasta  Tolerating ozempic 1mg/w  A1c not much better    The following portions of the patient's history were reviewed and updated as appropriate: allergies, current medications, past family history, past medical history, past social history, past surgical history and problem list.    Review of Systems   Gastrointestinal:  Negative for nausea and  vomiting.         Current Outpatient Medications   Medication Sig Dispense Refill    acetaminophen (TYLENOL) 650 mg CR tablet Take 650 mg by mouth as needed daily      Ascorbic Acid (VITAMIN C) 500 MG CAPS Take by mouth 2 (two) times a day       aspirin (ECOTRIN LOW STRENGTH) 81 mg EC tablet Take 81 mg by mouth if needed      atorvastatin (LIPITOR) 10 mg tablet Take 1 tablet (10 mg total) by mouth daily 90 tablet 1    Cyanocobalamin (VITAMIN B 12 PO) Take by mouth      fluticasone (FLONASE) 50 mcg/act nasal spray 1 spray into each nostril daily      Fluticasone-Salmeterol (Wixela Inhub) 250-50 mcg/dose inhaler Inhale 1 puff 2 (two) times a day Rinse mouth after use. 60 blister 2    gabapentin (NEURONTIN) 300 mg capsule Take 1 capsule (300 mg total) by mouth 2 (two) times a day 180 capsule 1    Magnesium Oxide 400 MG CAPS Take 400 mg by mouth 2 (two) times a day       metFORMIN (GLUCOPHAGE-XR) 500 mg 24 hr tablet TAKE 1 TABLET BY MOUTH EVERY DAY WITH DINNER 90 tablet 1    montelukast (SINGULAIR) 10 mg tablet TAKE 1 TABLET (10 MG TOTAL) BY MOUTH DAILY WITH BREAKFAST 90 tablet 1    Multiple Vitamins-Minerals (PRESERVISION AREDS PO) Take by mouth daily      omega-3-acid ethyl esters (LOVAZA) 1 g capsule TAKE 2 CAPSULES BY MOUTH TWICE A  capsule 1    omeprazole (PriLOSEC OTC) 20 MG tablet Take 20 mg by mouth daily      propranolol (INDERAL LA) 160 mg TAKE 1 CAPSULE BY MOUTH EVERY DAY 90 capsule 1    ramipril (ALTACE) 10 MG capsule TAKE 2 CAPSULES BY MOUTH EVERY  capsule 1    semaglutide, 2 mg/dose, (Ozempic) 8 mg/ mL injection pen Inject 0.75 mL (2 mg total) under the skin every 7 days 9 mL 1    SUMAtriptan (IMITREX) 100 mg tablet TAKE 1 TABLET BY MOUTH ONCE AS NEEDED FOR MIGRAINE 9 tablet 1    Symbicort 160-4.5 MCG/ACT inhaler Inhale 2 puffs 2 (two) times a day Rinse mouth after use 30.6 g 5    verapamil (CALAN-SR) 240 mg CR tablet TAKE 1 TABLET (240 MG TOTAL) BY MOUTH DAILY AT BEDTIME 90 tablet 1     No  "current facility-administered medications for this visit.       Objective:    /80   Pulse 74   Temp 97.9 °F (36.6 °C)   Resp 16   Ht 5' 1\" (1.549 m)   Wt 80.7 kg (178 lb)   BMI 33.63 kg/m²        Physical Exam  Vitals and nursing note reviewed.   Constitutional:       General: He is not in acute distress.     Appearance: He is well-developed. He is obese. He is not ill-appearing.   HENT:      Head: Normocephalic.   Eyes:      General: No scleral icterus.     Conjunctiva/sclera: Conjunctivae normal.   Cardiovascular:      Rate and Rhythm: Normal rate and regular rhythm.      Heart sounds: No murmur heard.  Pulmonary:      Effort: Pulmonary effort is normal. No respiratory distress.      Breath sounds: No wheezing.   Abdominal:      Palpations: Abdomen is soft.   Musculoskeletal:         General: No deformity.      Cervical back: Neck supple.      Right lower leg: No edema.      Left lower leg: No edema.   Skin:     General: Skin is warm and dry.      Coloration: Skin is not pale.   Neurological:      Mental Status: He is alert.      Motor: No weakness.      Gait: Gait normal.   Psychiatric:         Mood and Affect: Mood normal.         Behavior: Behavior normal.         Thought Content: Thought content normal.                Arley Ramirez,     "

## 2024-12-22 DIAGNOSIS — G43.009 MIGRAINE WITHOUT AURA AND WITHOUT STATUS MIGRAINOSUS, NOT INTRACTABLE: ICD-10-CM

## 2024-12-24 RX ORDER — VERAPAMIL HYDROCHLORIDE 240 MG/1
240 TABLET, FILM COATED, EXTENDED RELEASE ORAL
Qty: 90 TABLET | Refills: 1 | Status: SHIPPED | OUTPATIENT
Start: 2024-12-24

## 2024-12-27 DIAGNOSIS — E11.22 TYPE 2 DIABETES MELLITUS WITH STAGE 2 CHRONIC KIDNEY DISEASE, WITHOUT LONG-TERM CURRENT USE OF INSULIN  (HCC): ICD-10-CM

## 2024-12-27 DIAGNOSIS — N18.2 TYPE 2 DIABETES MELLITUS WITH STAGE 2 CHRONIC KIDNEY DISEASE, WITHOUT LONG-TERM CURRENT USE OF INSULIN  (HCC): ICD-10-CM

## 2024-12-27 RX ORDER — METFORMIN HYDROCHLORIDE 500 MG/1
500 TABLET, EXTENDED RELEASE ORAL
Qty: 90 TABLET | Refills: 1 | Status: SHIPPED | OUTPATIENT
Start: 2024-12-27

## 2025-01-07 DIAGNOSIS — R05.1 ACUTE COUGH: Primary | ICD-10-CM

## 2025-01-07 RX ORDER — BENZONATATE 200 MG/1
200 CAPSULE ORAL 3 TIMES DAILY PRN
Qty: 90 CAPSULE | Refills: 0 | Status: SHIPPED | OUTPATIENT
Start: 2025-01-07

## 2025-01-21 ENCOUNTER — CONSULT (OUTPATIENT)
Dept: FAMILY MEDICINE CLINIC | Facility: CLINIC | Age: 75
End: 2025-01-21
Payer: COMMERCIAL

## 2025-01-21 VITALS
WEIGHT: 171.6 LBS | SYSTOLIC BLOOD PRESSURE: 122 MMHG | TEMPERATURE: 98.5 F | HEIGHT: 61 IN | RESPIRATION RATE: 16 BRPM | DIASTOLIC BLOOD PRESSURE: 78 MMHG | HEART RATE: 76 BPM | BODY MASS INDEX: 32.4 KG/M2

## 2025-01-21 DIAGNOSIS — Z86.0109 PERSONAL HISTORY OF OTHER COLON POLYPS: ICD-10-CM

## 2025-01-21 DIAGNOSIS — E11.22 TYPE 2 DIABETES MELLITUS WITH STAGE 2 CHRONIC KIDNEY DISEASE, WITHOUT LONG-TERM CURRENT USE OF INSULIN  (HCC): ICD-10-CM

## 2025-01-21 DIAGNOSIS — N18.2 TYPE 2 DIABETES MELLITUS WITH STAGE 2 CHRONIC KIDNEY DISEASE, WITHOUT LONG-TERM CURRENT USE OF INSULIN  (HCC): ICD-10-CM

## 2025-01-21 DIAGNOSIS — H26.8 OTHER CATARACT OF LEFT EYE: Primary | ICD-10-CM

## 2025-01-21 DIAGNOSIS — E78.49 OTHER HYPERLIPIDEMIA: ICD-10-CM

## 2025-01-21 DIAGNOSIS — Z12.11 COLON CANCER SCREENING: ICD-10-CM

## 2025-01-21 DIAGNOSIS — E66.2 HYPOVENTILATION ASSOCIATED WITH OBESITY SYNDROME (HCC): ICD-10-CM

## 2025-01-21 PROBLEM — J41.0 SIMPLE CHRONIC BRONCHITIS (HCC): Chronic | Status: RESOLVED | Noted: 2024-10-16 | Resolved: 2025-01-21

## 2025-01-21 PROBLEM — H26.9 CATARACTA: Status: ACTIVE | Noted: 2025-01-21

## 2025-01-21 PROCEDURE — G2211 COMPLEX E/M VISIT ADD ON: HCPCS | Performed by: FAMILY MEDICINE

## 2025-01-21 PROCEDURE — 99214 OFFICE O/P EST MOD 30 MIN: CPT | Performed by: FAMILY MEDICINE

## 2025-01-21 RX ORDER — ATORVASTATIN CALCIUM 10 MG/1
10 TABLET, FILM COATED ORAL DAILY
Qty: 100 TABLET | Refills: 1 | Status: SHIPPED | OUTPATIENT
Start: 2025-01-21

## 2025-01-21 NOTE — PROGRESS NOTES
Name: Larry Aragon      : 1950      MRN: [id number]  Encounter Provider: Arley Ramirez DO  Encounter Date: 2025   Encounter department: [facility]  :  Assessment & Plan  Colon cancer screening  Referral given       Personal history of other colon polyps    Orders:    Ambulatory referral to Gastroenterology; Future    Hypoventilation associated with obesity syndrome (HCC)  Wt loss suggested       Type 2 diabetes mellitus with stage 2 chronic kidney disease, without long-term current use of insulin  (HCC)  A1c 9.3  Repeat pending, diet recommended  Lab Results   Component Value Date    HGBA1C 9.3 (H) 2024            Other cataract of left eye  Ok for surgery, form completed       Other hyperlipidemia  Continue statin and diet  Orders:    atorvastatin (LIPITOR) 10 mg tablet; Take 1 tablet (10 mg total) by mouth daily           History of Present Illness   Pre-op Exam    Pre-operative Risk Factors:    History of cerebrovascular disease: No    History of ischemic heart disease: No  Pre-operative treatment with insulin: No  Pre-operative creatinine >2 mg/dL: No    History of congestive heart failure: No      Planned procedure:  left cataract  Surgeon:  Dr Dhaval Ennis  Date:  2/3/25  Anesthesia type:  sedation    Prior major surgeries:  right cataract  Problems with anesthesia in past:  n    Can walk 4 blocks or 2 flights of stairs:  y  History of excessive bleeding:  n  History of excessive clotting:  n  Personal history of DVT or Pe:  n    Taking NSAIDS:  n  Takings vitamins D or E or A or fish oil supplements:  lovaza    Usual am medications:  b12, Mg, C, lovaza, benzonatate, singulair, preservision, ramipril, propranolol, gabepentin, atorvastatin    Review of Systems   Constitutional:  Negative for fever.   Respiratory:  Negative for shortness of breath.    Cardiovascular:  Negative for chest pain.     Current Outpatient Medications   Medication Instructions    acetaminophen  "(TYLENOL) 650 mg, As needed    Ascorbic Acid (VITAMIN C) 500 MG CAPS 2 times daily    aspirin (ECOTRIN LOW STRENGTH) 81 mg, As needed    atorvastatin (LIPITOR) 10 mg, Oral, Daily    benzonatate (TESSALON) 200 mg, Oral, 3 times daily PRN    Cyanocobalamin (VITAMIN B 12 PO) Take by mouth    fluticasone (FLONASE) 50 mcg/act nasal spray 1 spray, Daily    Fluticasone-Salmeterol (Wixela Inhub) 250-50 mcg/dose inhaler 1 puff, Inhalation, 2 times daily, Rinse mouth after use.    gabapentin (NEURONTIN) 300 mg, Oral, 2 times daily    Magnesium Oxide 400 mg, 2 times daily    metFORMIN (GLUCOPHAGE-XR) 500 mg, Oral, Daily with dinner    montelukast (SINGULAIR) 10 mg, Oral, Daily with breakfast    Multiple Vitamins-Minerals (PRESERVISION AREDS PO) Daily    omega-3-acid ethyl esters (LOVAZA) 2 g, Oral, 2 times daily    omeprazole (PRILOSEC OTC) 20 mg, Daily    propranolol (INDERAL LA) 160 mg, Oral, Daily    ramipril (ALTACE) 20 mg, Oral, Daily    semaglutide (2 mg/dose) (OZEMPIC) 2 mg, Subcutaneous, Every 7 days    SUMAtriptan (IMITREX) 100 mg tablet TAKE 1 TABLET BY MOUTH ONCE AS NEEDED FOR MIGRAINE    verapamil (CALAN-SR) 240 mg, Oral, Daily at bedtime       Objective   /78   Pulse 76   Temp 98.5 °F (36.9 °C)   Resp 16   Ht 5' 1\" (1.549 m)   Wt 77.8 kg (171 lb 9.6 oz)   BMI 32.42 kg/m²      Physical Exam  Vitals and nursing note reviewed.   Constitutional:       General: He is not in acute distress.     Appearance: He is well-developed. He is obese. He is not ill-appearing.   HENT:      Head: Normocephalic.      Right Ear: Tympanic membrane normal.      Left Ear: Tympanic membrane normal.   Eyes:      General: No scleral icterus.        Right eye: No discharge.         Left eye: No discharge.      Conjunctiva/sclera: Conjunctivae normal.   Cardiovascular:      Rate and Rhythm: Normal rate and regular rhythm.      Heart sounds: No murmur heard.  Pulmonary:      Effort: Pulmonary effort is normal. No respiratory " distress.      Breath sounds: No wheezing.   Abdominal:      Palpations: Abdomen is soft.      Tenderness: There is no abdominal tenderness.   Musculoskeletal:         General: No deformity.      Cervical back: Neck supple.      Right lower leg: No edema.      Left lower leg: No edema.   Skin:     General: Skin is warm and dry.      Coloration: Skin is not jaundiced or pale.   Neurological:      Mental Status: He is alert.      Motor: No weakness.      Gait: Gait normal.   Psychiatric:         Mood and Affect: Mood normal.         Behavior: Behavior normal.         Thought Content: Thought content normal.

## 2025-01-22 NOTE — ASSESSMENT & PLAN NOTE
A1c 9.3  Repeat pending, diet recommended  Lab Results   Component Value Date    HGBA1C 9.3 (H) 11/26/2024

## 2025-01-22 NOTE — ASSESSMENT & PLAN NOTE
Continue statin and diet  Orders:    atorvastatin (LIPITOR) 10 mg tablet; Take 1 tablet (10 mg total) by mouth daily

## 2025-01-29 ENCOUNTER — TELEPHONE (OUTPATIENT)
Age: 75
End: 2025-01-29

## 2025-01-29 NOTE — TELEPHONE ENCOUNTER
Jere from [facility] in [place]  called in stated that they need a copy of patients last office visit faxed to [phone] for patient upcoming surgery. Thank you

## 2025-02-01 DIAGNOSIS — J45.30 MILD PERSISTENT ASTHMA WITHOUT COMPLICATION: ICD-10-CM

## 2025-02-03 RX ORDER — FLUTICASONE PROPIONATE AND SALMETEROL 250; 50 UG/1; UG/1
POWDER RESPIRATORY (INHALATION)
Qty: 60 BLISTER | Refills: 5 | Status: SHIPPED | OUTPATIENT
Start: 2025-02-03

## 2025-02-19 DIAGNOSIS — G43.009 MIGRAINE WITHOUT AURA AND WITHOUT STATUS MIGRAINOSUS, NOT INTRACTABLE: ICD-10-CM

## 2025-02-19 RX ORDER — SUMATRIPTAN SUCCINATE 100 MG/1
TABLET ORAL
Qty: 9 TABLET | Refills: 5 | Status: SHIPPED | OUTPATIENT
Start: 2025-02-19

## 2025-02-28 DIAGNOSIS — G43.009 MIGRAINE WITHOUT AURA AND WITHOUT STATUS MIGRAINOSUS, NOT INTRACTABLE: ICD-10-CM

## 2025-02-28 DIAGNOSIS — I10 BENIGN ESSENTIAL HYPERTENSION: ICD-10-CM

## 2025-02-28 RX ORDER — PROPRANOLOL HYDROCHLORIDE 160 MG/1
160 CAPSULE, EXTENDED RELEASE ORAL DAILY
Qty: 90 CAPSULE | Refills: 1 | Status: SHIPPED | OUTPATIENT
Start: 2025-02-28

## 2025-03-01 ENCOUNTER — RESULTS FOLLOW-UP (OUTPATIENT)
Dept: FAMILY MEDICINE CLINIC | Facility: CLINIC | Age: 75
End: 2025-03-01

## 2025-03-01 LAB
ALBUMIN SERPL-MCNC: 4.4 G/DL (ref 3.8–4.8)
ALP SERPL-CCNC: 128 IU/L (ref 44–121)
ALT SERPL-CCNC: 47 IU/L (ref 0–44)
AST SERPL-CCNC: 36 IU/L (ref 0–40)
BILIRUB SERPL-MCNC: 0.4 MG/DL (ref 0–1.2)
BUN SERPL-MCNC: 18 MG/DL (ref 8–27)
BUN/CREAT SERPL: 20 (ref 10–24)
CALCIUM SERPL-MCNC: 8.9 MG/DL (ref 8.6–10.2)
CHLORIDE SERPL-SCNC: 104 MMOL/L (ref 96–106)
CO2 SERPL-SCNC: 18 MMOL/L (ref 20–29)
CREAT SERPL-MCNC: 0.89 MG/DL (ref 0.76–1.27)
EGFR: 90 ML/MIN/1.73
EST. AVERAGE GLUCOSE BLD GHB EST-MCNC: 217 MG/DL
GLOBULIN SER-MCNC: 2 G/DL (ref 1.5–4.5)
GLUCOSE SERPL-MCNC: 189 MG/DL (ref 70–99)
HBA1C MFR BLD: 9.2 % (ref 4.8–5.6)
POTASSIUM SERPL-SCNC: 4.4 MMOL/L (ref 3.5–5.2)
PROT SERPL-MCNC: 6.4 G/DL (ref 6–8.5)
SODIUM SERPL-SCNC: 138 MMOL/L (ref 134–144)

## 2025-03-04 ENCOUNTER — OFFICE VISIT (OUTPATIENT)
Dept: FAMILY MEDICINE CLINIC | Facility: CLINIC | Age: 75
End: 2025-03-04
Payer: COMMERCIAL

## 2025-03-04 VITALS
BODY MASS INDEX: 32.13 KG/M2 | SYSTOLIC BLOOD PRESSURE: 132 MMHG | TEMPERATURE: 97.5 F | HEIGHT: 61 IN | HEART RATE: 72 BPM | DIASTOLIC BLOOD PRESSURE: 82 MMHG | WEIGHT: 170.2 LBS | RESPIRATION RATE: 17 BRPM

## 2025-03-04 DIAGNOSIS — I10 BENIGN ESSENTIAL HYPERTENSION: ICD-10-CM

## 2025-03-04 DIAGNOSIS — N18.2 TYPE 2 DIABETES MELLITUS WITH STAGE 2 CHRONIC KIDNEY DISEASE, WITHOUT LONG-TERM CURRENT USE OF INSULIN  (HCC): ICD-10-CM

## 2025-03-04 DIAGNOSIS — Z00.00 MEDICARE ANNUAL WELLNESS VISIT, SUBSEQUENT: Primary | ICD-10-CM

## 2025-03-04 DIAGNOSIS — E11.22 TYPE 2 DIABETES MELLITUS WITH STAGE 2 CHRONIC KIDNEY DISEASE, WITHOUT LONG-TERM CURRENT USE OF INSULIN  (HCC): ICD-10-CM

## 2025-03-04 DIAGNOSIS — E78.49 OTHER HYPERLIPIDEMIA: ICD-10-CM

## 2025-03-04 PROCEDURE — G0439 PPPS, SUBSEQ VISIT: HCPCS | Performed by: FAMILY MEDICINE

## 2025-03-04 PROCEDURE — G2211 COMPLEX E/M VISIT ADD ON: HCPCS | Performed by: FAMILY MEDICINE

## 2025-03-04 PROCEDURE — 99214 OFFICE O/P EST MOD 30 MIN: CPT | Performed by: FAMILY MEDICINE

## 2025-03-04 NOTE — ASSESSMENT & PLAN NOTE
Not controlled  Increase meformin    We can gradually increase metformin 500mg once a day  to 1000mg once a day and higher in future if needed/tolerated to 2000mg per day but if any issues arise with tolerability, then another pill such as Jardiance or Farxiga could be added to the tolerated dose of metformin.  Lab Results   Component Value Date    HGBA1C 9.2 (H) 02/28/2025       Orders:  •  IRIS Diabetic eye exam  •  Comprehensive metabolic panel; Future  •  Hemoglobin A1C; Future

## 2025-03-04 NOTE — PATIENT INSTRUCTIONS
We can gradually increase metformin 500mg once a day  to 1000mg once a day and higher in future if needed/tolerated to 2000mg per day but if any issues arise with tolerability, then another pill such as Jardiance or Farxiga could be added to the tolerated dose of metformin.

## 2025-03-04 NOTE — PROGRESS NOTES
Name: Larry Aragon      : 1950      MRN: [id number]  Encounter Provider: Arley Ramirez DO  Encounter Date: 3/4/2025   Encounter department: [facility]  :  Assessment & Plan  Type 2 diabetes mellitus with stage 2 chronic kidney disease, without long-term current use of insulin  (HCC)  Not controlled  Increase meformin    We can gradually increase metformin 500mg once a day  to 1000mg once a day and higher in future if needed/tolerated to 2000mg per day but if any issues arise with tolerability, then another pill such as Jardiance or Farxiga could be added to the tolerated dose of metformin.  Lab Results   Component Value Date    HGBA1C 9.2 (H) 2025       Orders:  •  IRIS Diabetic eye exam  •  Comprehensive metabolic panel; Future  •  Hemoglobin A1C; Future    Benign essential hypertension  Stable on meds       Other hyperlipidemia  Stable on meds for risk reduction but        Medicare annual wellness visit, subsequent  updated              History of Present Illness   HPI  Doing better with diet  Exercising, could do more  Ozempic 2mg/w with nausea    Review of Systems   Gastrointestinal:  Negative for abdominal pain, nausea and vomiting.     Family History   Problem Relation Age of Onset   • Arthritis Mother    • COPD Mother    • Hypertension Mother    • Lung cancer Father    • Asthma Brother    • Cancer Brother         colon   • Diabetes Sister    • Hypertension Sister    • Stomach cancer Brother    • Asthma Brother       Social History     Tobacco Use   • Smoking status: Never     Passive exposure: Past   • Smokeless tobacco: Never   • Tobacco comments:     from infancy    Vaping Use   • Vaping status: Never Used   Substance Use Topics   • Alcohol use: Yes     Alcohol/week: 2.0 standard drinks of alcohol     Types: 2 Glasses of wine per week     Comment: social   • Drug use: No      Current Outpatient Medications   Medication Instructions   • acetaminophen (TYLENOL) 650  "mg, As needed   • Ascorbic Acid (VITAMIN C) 500 MG CAPS 2 times daily   • aspirin (ECOTRIN LOW STRENGTH) 81 mg, As needed   • atorvastatin (LIPITOR) 10 mg, Oral, Daily   • benzonatate (TESSALON) 200 mg, Oral, 3 times daily PRN   • Cyanocobalamin (VITAMIN B 12 PO) Take by mouth   • fluticasone (FLONASE) 50 mcg/act nasal spray 1 spray, Daily   • Fluticasone-Salmeterol (Advair) 250-50 mcg/dose inhaler INHALE 1 PUFF 2 TIMES A DAY RINSE MOUTH AFTER USE.   • Magnesium Oxide 400 mg, 2 times daily   • metFORMIN (GLUCOPHAGE-XR) 500 mg, Oral, Daily with dinner   • montelukast (SINGULAIR) 10 mg, Oral, Daily with breakfast   • Multiple Vitamins-Minerals (PRESERVISION AREDS PO) Daily   • omega-3-acid ethyl esters (LOVAZA) 2 g, Oral, 2 times daily   • omeprazole (PRILOSEC OTC) 20 mg, Daily   • propranolol (INDERAL LA) 160 mg, Oral, Daily   • ramipril (ALTACE) 20 mg, Oral, Daily   • semaglutide (2 mg/dose) (OZEMPIC) 2 mg, Subcutaneous, Every 7 days   • SUMAtriptan (IMITREX) 100 mg tablet TAKE 1 TABLET BY MOUTH ONCE AS NEEDED FOR MIGRAINE   • verapamil (CALAN-SR) 240 mg, Oral, Daily at bedtime     >>>>>>>>  Return in about 13 weeks (around 6/3/2025).  >>>>>>>>    [POCT]  No results found for this or any previous visit (from the past 24 hours).    Visit Vitals  /82   Pulse 72   Temp 97.5 °F (36.4 °C)   Resp 17   Ht 5' 1\" (1.549 m)   Wt 77.2 kg (170 lb 3.2 oz)   BMI 32.16 kg/m²   Smoking Status Never   BSA 1.76 m²        Objective   /82   Pulse 72   Temp 97.5 °F (36.4 °C)   Resp 17   Ht 5' 1\" (1.549 m)   Wt 77.2 kg (170 lb 3.2 oz)   BMI 32.16 kg/m²      Physical Exam  Vitals and nursing note reviewed.   Constitutional:       General: He is not in acute distress.     Appearance: He is well-developed. He is obese. He is not ill-appearing.   HENT:      Head: Normocephalic.      Right Ear: Tympanic membrane normal.      Left Ear: Tympanic membrane normal.      Nose: No congestion.   Eyes:      General: No scleral " icterus.     Conjunctiva/sclera: Conjunctivae normal.   Cardiovascular:      Rate and Rhythm: Normal rate and regular rhythm.      Pulses: no weak pulses.           Dorsalis pedis pulses are 2+ on the right side and 2+ on the left side.      Heart sounds: No murmur heard.  Pulmonary:      Effort: Pulmonary effort is normal. No respiratory distress.      Breath sounds: No wheezing.   Abdominal:      Palpations: Abdomen is soft.      Tenderness: There is no abdominal tenderness.   Musculoskeletal:         General: No deformity.      Cervical back: Neck supple.      Right lower leg: No edema.      Left lower leg: No edema.   Skin:     General: Skin is warm and dry.      Coloration: Skin is not jaundiced or pale.   Neurological:      Mental Status: He is alert.      Motor: No weakness.      Gait: Gait normal.   Psychiatric:         Mood and Affect: Mood normal.         Behavior: Behavior normal.         Thought Content: Thought content normal.     Diabetic Foot Exam    Patient's shoes and socks removed.    Right Foot/Ankle   Right Foot Inspection  Skin Exam: skin intact. No abnormal color.     Toe Exam:  no right toe deformity    Sensory   Monofilament testing: intact    Vascular  The right DP pulse is 2+.     Left Foot/Ankle  Left Foot Inspection  Skin Exam: skin intact. Normal color.     Toe Exam: No left toe deformity.     Sensory   Monofilament testing: intact    Vascular  The left DP pulse is 2+.     Assign Risk Category  No deformity present  No loss of protective sensation  No weak pulses  Risk: 0

## 2025-03-06 PROBLEM — Z00.00 MEDICARE ANNUAL WELLNESS VISIT, SUBSEQUENT: Status: ACTIVE | Noted: 2025-03-06

## 2025-03-07 NOTE — PROGRESS NOTES
Larry is here for his Subsequent Wellness visit. Last Medicare Wellness visit information reviewed, patient interviewed and updates made to the record today.      Health Risk Assessment:   Patient rates overall health as good. Patient feels that their physical health rating is same. Patient is very satisfied with their life. Eyesight was rated as same. Hearing was rated as same. Patient feels that their emotional and mental health rating is much better. Patients states they are never, rarely angry. Patient states they are never, rarely unusually tired/fatigued. Pain experienced in the last 7 days has been some. Patient's pain rating has been 2/10. Patient states that he has experienced weight loss or gain in last 6 months.     Depression Screening:   PHQ-2 Score: 0      Fall Risk Screening:   In the past year, patient has experienced: no history of falling in past year      Home Safety:  Patient does not have trouble with stairs inside or outside of their home. Patient has working smoke alarms and has working carbon monoxide detector. Home safety hazards include: none.     Nutrition:   Current diet is Diabetic, Low Carb, No Added Salt and Limited junk food.     Medications:   Patient is currently taking over-the-counter supplements. OTC medications include: see medication list. Patient is not able to manage medications.     Activities of Daily Living (ADLs)/Instrumental Activities of Daily Living (IADLs):   Walk and transfer into and out of bed and chair?: Yes  Dress and groom yourself?: Yes    Bathe or shower yourself?: Yes    Feed yourself? Yes  Do your laundry/housekeeping?: Yes  Manage your money, pay your bills and track your expenses?: No  Make your own meals?: Yes    Do your own shopping?: Yes    Previous Hospitalizations:   Any hospitalizations or ED visits within the last 12 months?: No      Advance Care Planning:   Living will: Yes    Durable POA for healthcare: Yes    Advanced directive: Yes    End of  Life Decisions reviewed with patient: No      Cognitive Screening:   Provider or family/friend/caregiver concerned regarding cognition?: No    PREVENTIVE SCREENINGS      Cardiovascular Screening:    General: Screening Not Indicated and History Lipid Disorder      Diabetes Screening:     General: Screening Not Indicated and History Diabetes      Colorectal Cancer Screening:     General: Screening Current      Prostate Cancer Screening:    General: Screening Current      Osteoporosis Screening:    General: Screening Not Indicated      Abdominal Aortic Aneurysm (AAA) Screening:    Risk factors include: age between 65-74 yo and tobacco use        General: Screening Not Indicated      Lung Cancer Screening:     General: Screening Not Indicated      Hepatitis C Screening:    General: Patient Declines    Hep C Screening Accepted: No     Screening, Brief Intervention, and Referral to Treatment (SBIRT)     Screening  Typical number of drinks in a day: 0  Typical number of drinks in a week: 0  Interpretation: Low risk drinking behavior.    Single Item Drug Screening:  How often have you used an illegal drug (including marijuana) or a prescription medication for non-medical reasons in the past year? never    Single Item Drug Screen Score: 0  Interpretation: Negative screen for possible drug use disorder    Other Counseling Topics:   Car/seat belt/driving safety and regular weightbearing exercise.

## 2025-03-27 DIAGNOSIS — E78.1 HIGH TRIGLYCERIDES: ICD-10-CM

## 2025-03-27 RX ORDER — OMEGA-3-ACID ETHYL ESTERS 1 G/1
2 CAPSULE, LIQUID FILLED ORAL 2 TIMES DAILY
Qty: 360 CAPSULE | Refills: 1 | Status: SHIPPED | OUTPATIENT
Start: 2025-03-27

## 2025-04-05 PROBLEM — Z00.00 MEDICARE ANNUAL WELLNESS VISIT, SUBSEQUENT: Status: RESOLVED | Noted: 2025-03-06 | Resolved: 2025-04-05

## 2025-05-19 ENCOUNTER — TELEPHONE (OUTPATIENT)
Dept: FAMILY MEDICINE CLINIC | Facility: CLINIC | Age: 75
End: 2025-05-19

## 2025-05-19 NOTE — TELEPHONE ENCOUNTER
PA Omega-3-acid Ethyl Esters 1 GM SUBMITTED    to [employer]     via    []CMM-KEY: [id number]   []Surescripts-Case ID #    []Availity-Auth ID #  NDC #    []Faxed to plan   []Other website    []Phone call Case ID #      [x]PA sent as URGENT    All office notes, labs and other pertaining documents and studies sent. Clinical questions answered. Awaiting determination from insurance company.     Turnaround time for your insurance to make a decision on your Prior Authorization can take 7-21 business days.

## 2025-05-21 NOTE — TELEPHONE ENCOUNTER
PA Omega-3-acid Ethyl Esters 1 GM APPROVED     Date(s) approved 5/19/26    Case # [id number]    Patient advised by          []Crumpet Cashmerehart Message  []Phone call   []LMOM  []L/M to call office as no active Communication consent on file  []Unable to leave detailed message as VM not approved on Communication consent       Pharmacy advised by    [x]Fax  [x]Phone call  []Secure Chat    Specialty Pharmacy    []

## 2025-05-24 DIAGNOSIS — N18.2 TYPE 2 DIABETES MELLITUS WITH STAGE 2 CHRONIC KIDNEY DISEASE, WITHOUT LONG-TERM CURRENT USE OF INSULIN  (HCC): ICD-10-CM

## 2025-05-24 DIAGNOSIS — E11.22 TYPE 2 DIABETES MELLITUS WITH STAGE 2 CHRONIC KIDNEY DISEASE, WITHOUT LONG-TERM CURRENT USE OF INSULIN  (HCC): ICD-10-CM

## 2025-05-25 RX ORDER — SEMAGLUTIDE 2.68 MG/ML
INJECTION, SOLUTION SUBCUTANEOUS
Qty: 9 ML | Refills: 1 | Status: SHIPPED | OUTPATIENT
Start: 2025-05-25

## 2025-05-30 DIAGNOSIS — I10 BENIGN ESSENTIAL HYPERTENSION: ICD-10-CM

## 2025-05-30 RX ORDER — RAMIPRIL 10 MG/1
20 CAPSULE ORAL DAILY
Qty: 180 CAPSULE | Refills: 1 | Status: SHIPPED | OUTPATIENT
Start: 2025-05-30

## 2025-06-03 ENCOUNTER — TRANSCRIBE ORDERS (OUTPATIENT)
Dept: LAB | Facility: CLINIC | Age: 75
End: 2025-06-03

## 2025-06-03 ENCOUNTER — APPOINTMENT (OUTPATIENT)
Dept: LAB | Facility: CLINIC | Age: 75
End: 2025-06-03
Attending: FAMILY MEDICINE
Payer: COMMERCIAL

## 2025-06-03 DIAGNOSIS — N18.2 TYPE 2 DIABETES MELLITUS WITH STAGE 2 CHRONIC KIDNEY DISEASE, WITHOUT LONG-TERM CURRENT USE OF INSULIN  (HCC): ICD-10-CM

## 2025-06-03 DIAGNOSIS — E11.22 TYPE 2 DIABETES MELLITUS WITH STAGE 2 CHRONIC KIDNEY DISEASE, WITHOUT LONG-TERM CURRENT USE OF INSULIN  (HCC): ICD-10-CM

## 2025-06-03 LAB
ALBUMIN SERPL BCG-MCNC: 4.2 G/DL (ref 3.5–5)
ALP SERPL-CCNC: 110 U/L (ref 34–104)
ALT SERPL W P-5'-P-CCNC: 42 U/L (ref 7–52)
ANION GAP SERPL CALCULATED.3IONS-SCNC: 7 MMOL/L (ref 4–13)
AST SERPL W P-5'-P-CCNC: 32 U/L (ref 13–39)
BILIRUB SERPL-MCNC: 0.54 MG/DL (ref 0.2–1)
BUN SERPL-MCNC: 23 MG/DL (ref 5–25)
CALCIUM SERPL-MCNC: 8.9 MG/DL (ref 8.4–10.2)
CHLORIDE SERPL-SCNC: 107 MMOL/L (ref 96–108)
CO2 SERPL-SCNC: 25 MMOL/L (ref 21–32)
CREAT SERPL-MCNC: 0.95 MG/DL (ref 0.6–1.3)
EST. AVERAGE GLUCOSE BLD GHB EST-MCNC: 169 MG/DL
GFR SERPL CREATININE-BSD FRML MDRD: 78 ML/MIN/1.73SQ M
GLUCOSE P FAST SERPL-MCNC: 139 MG/DL (ref 65–99)
HBA1C MFR BLD: 7.5 %
POTASSIUM SERPL-SCNC: 4.7 MMOL/L (ref 3.5–5.3)
PROT SERPL-MCNC: 6.3 G/DL (ref 6.4–8.4)
SODIUM SERPL-SCNC: 139 MMOL/L (ref 135–147)

## 2025-06-03 PROCEDURE — 80053 COMPREHEN METABOLIC PANEL: CPT

## 2025-06-03 PROCEDURE — 83036 HEMOGLOBIN GLYCOSYLATED A1C: CPT

## 2025-06-03 PROCEDURE — 36415 COLL VENOUS BLD VENIPUNCTURE: CPT

## 2025-06-04 ENCOUNTER — RESULTS FOLLOW-UP (OUTPATIENT)
Dept: FAMILY MEDICINE CLINIC | Facility: CLINIC | Age: 75
End: 2025-06-04

## 2025-06-17 ENCOUNTER — OFFICE VISIT (OUTPATIENT)
Dept: FAMILY MEDICINE CLINIC | Facility: CLINIC | Age: 75
End: 2025-06-17
Payer: COMMERCIAL

## 2025-06-17 VITALS
HEIGHT: 61 IN | SYSTOLIC BLOOD PRESSURE: 130 MMHG | WEIGHT: 169.6 LBS | OXYGEN SATURATION: 98 % | BODY MASS INDEX: 32.02 KG/M2 | DIASTOLIC BLOOD PRESSURE: 80 MMHG | HEART RATE: 73 BPM

## 2025-06-17 DIAGNOSIS — E78.49 OTHER HYPERLIPIDEMIA: ICD-10-CM

## 2025-06-17 DIAGNOSIS — Z12.11 COLON CANCER SCREENING: ICD-10-CM

## 2025-06-17 DIAGNOSIS — I10 BENIGN ESSENTIAL HYPERTENSION: ICD-10-CM

## 2025-06-17 DIAGNOSIS — E11.22 TYPE 2 DIABETES MELLITUS WITH STAGE 2 CHRONIC KIDNEY DISEASE, WITHOUT LONG-TERM CURRENT USE OF INSULIN  (HCC): Primary | ICD-10-CM

## 2025-06-17 DIAGNOSIS — Z12.5 ENCOUNTER FOR SCREENING FOR MALIGNANT NEOPLASM OF PROSTATE: ICD-10-CM

## 2025-06-17 DIAGNOSIS — Z86.0100 HISTORY OF COLON POLYPS: ICD-10-CM

## 2025-06-17 DIAGNOSIS — N18.2 TYPE 2 DIABETES MELLITUS WITH STAGE 2 CHRONIC KIDNEY DISEASE, WITHOUT LONG-TERM CURRENT USE OF INSULIN  (HCC): Primary | ICD-10-CM

## 2025-06-17 DIAGNOSIS — G43.009 MIGRAINE WITHOUT AURA AND WITHOUT STATUS MIGRAINOSUS, NOT INTRACTABLE: ICD-10-CM

## 2025-06-17 LAB
LEFT EYE DIABETIC RETINOPATHY: NORMAL
LEFT EYE IMAGE QUALITY: NORMAL
LEFT EYE MACULAR EDEMA: NORMAL
LEFT EYE OTHER RETINOPATHY: NORMAL
RIGHT EYE DIABETIC RETINOPATHY: NORMAL
RIGHT EYE IMAGE QUALITY: NORMAL
RIGHT EYE MACULAR EDEMA: NORMAL
RIGHT EYE OTHER RETINOPATHY: NORMAL
SEVERITY (EYE EXAM): NORMAL

## 2025-06-17 PROCEDURE — 99214 OFFICE O/P EST MOD 30 MIN: CPT | Performed by: FAMILY MEDICINE

## 2025-06-17 PROCEDURE — 92250 FUNDUS PHOTOGRAPHY W/I&R: CPT | Performed by: FAMILY MEDICINE

## 2025-06-17 RX ORDER — METFORMIN HYDROCHLORIDE 500 MG/1
1500 TABLET, EXTENDED RELEASE ORAL
Qty: 270 TABLET | Refills: 1 | Status: SHIPPED | OUTPATIENT
Start: 2025-06-17

## 2025-06-17 NOTE — PATIENT INSTRUCTIONS
If possible please take 3 pills of the metformin (500mg each) per day to better control your diabetes to a goal A1c under 7 in about 3 months.

## 2025-06-17 NOTE — ASSESSMENT & PLAN NOTE
Improving  Can f/u in 5m to sync with UPC and PSA  Lab Results   Component Value Date    HGBA1C 7.5 (H) 06/03/2025       Orders:    IRIS Diabetic eye exam    Comprehensive metabolic panel; Future    Albumin / creatinine urine ratio; Future    Hemoglobin A1C; Future    metFORMIN (GLUCOPHAGE-XR) 500 mg 24 hr tablet; Take 3 tablets (1,500 mg total) by mouth daily with dinner

## 2025-06-17 NOTE — PROGRESS NOTES
Name: Larry Aragon      : 1950      MRN: [id number]  Encounter Provider: Arley Ramirez DO  Encounter Date: 2025   Encounter department: [facility]  :  Assessment & Plan  Type 2 diabetes mellitus with stage 2 chronic kidney disease, without long-term current use of insulin  (HCC)  Improving  Can f/u in 5m to sync with UPC and PSA  Lab Results   Component Value Date    HGBA1C 7.5 (H) 2025       Orders:    IRIS Diabetic eye exam    Comprehensive metabolic panel; Future    Albumin / creatinine urine ratio; Future    Hemoglobin A1C; Future    metFORMIN (GLUCOPHAGE-XR) 500 mg 24 hr tablet; Take 3 tablets (1,500 mg total) by mouth daily with dinner    Colon cancer screening  recommended       History of colon polyps    Orders:    Ambulatory referral to Gastroenterology; Future    Encounter for screening for malignant neoplasm of prostate  yearly  Orders:    PSA Total (Reflex To Free); Future    Other hyperlipidemia  Stable on statin  Orders:    Lipid Panel with Direct LDL reflex; Future    Benign essential hypertension  Stable on meds       Migraine without aura and without status migrainosus, not intractable    unchanged              History of Present Illness   HPI  No gi side effects on 1000mg metformin   A1c improved but not at goal  Going to gym  ALT improved    Lab Results   Component Value Date    HGBA1C 7.5 (H) 2025    HGBA1C 9.2 (H) 2025    HGBA1C 9.3 (H) 2024     Lab Results   Component Value Date    GLUF 139 (H) 2025    LDLCALC  2024      Comment:      Calculated LDL invalid, triglycerides >400 mg/dl    CREATININE 0.95 2025       Review of Systems   Gastrointestinal:  Negative for abdominal pain, diarrhea, nausea and vomiting.     Family History[1] was reviewed  Social History[2] was reviewed    Current Outpatient Medications   Medication Instructions    acetaminophen (TYLENOL) 650 mg, As needed    Ascorbic Acid (VITAMIN C) 500 MG CAPS 2  "times daily    aspirin (ECOTRIN LOW STRENGTH) 81 mg, As needed    atorvastatin (LIPITOR) 10 mg, Oral, Daily    benzonatate (TESSALON) 200 mg, Oral, 3 times daily PRN    Cyanocobalamin (VITAMIN B 12 PO) Take by mouth    fluticasone (FLONASE) 50 mcg/act nasal spray 1 spray, Daily    Fluticasone-Salmeterol (Advair) 250-50 mcg/dose inhaler INHALE 1 PUFF 2 TIMES A DAY RINSE MOUTH AFTER USE.    Magnesium Oxide 400 mg, 2 times daily    metFORMIN (GLUCOPHAGE-XR) 1,500 mg, Oral, Daily with dinner    montelukast (SINGULAIR) 10 mg, Oral, Daily with breakfast    Multiple Vitamins-Minerals (PRESERVISION AREDS PO) Daily    omega-3-acid ethyl esters (LOVAZA) 2 g, Oral, 2 times daily    omeprazole (PRILOSEC OTC) 20 mg, Daily    propranolol (INDERAL LA) 160 mg, Oral, Daily    ramipril (ALTACE) 20 mg, Oral, Daily    semaglutide, 2 mg/dose, (Ozempic, 2 MG/DOSE,) 8 mg/ mL injection pen INJECT 0.75 ML (2 MG) SUBCUTANEOUSLY EVERY 7 DAYS    SUMAtriptan (IMITREX) 100 mg tablet TAKE 1 TABLET BY MOUTH ONCE AS NEEDED FOR MIGRAINE    verapamil (CALAN-SR) 240 mg, Oral, Daily at bedtime     >>>>>>>>  Return in about 6 months (around 12/4/2025).  >>>>>>>>    [POCT]  No results found for this or any previous visit (from the past 24 hours).    Visit Vitals  /80 (BP Location: Right arm, Patient Position: Sitting, Cuff Size: Standard)   Pulse 73   Ht 5' 1\" (1.549 m)   Wt 76.9 kg (169 lb 9.6 oz)   SpO2 98%   BMI 32.05 kg/m²   Smoking Status Never   BSA 1.76 m²        Objective   /80 (BP Location: Right arm, Patient Position: Sitting, Cuff Size: Standard)   Pulse 73   Ht 5' 1\" (1.549 m)   Wt 76.9 kg (169 lb 9.6 oz)   SpO2 98%   BMI 32.05 kg/m²      Physical Exam  Vitals and nursing note reviewed.   Constitutional:       General: He is not in acute distress.     Appearance: He is well-developed. He is obese. He is not ill-appearing.   HENT:      Head: Normocephalic.      Right Ear: Tympanic membrane normal.      Left Ear: Tympanic " membrane normal.      Nose: No congestion.     Eyes:      General: No scleral icterus.     Conjunctiva/sclera: Conjunctivae normal.       Cardiovascular:      Rate and Rhythm: Normal rate and regular rhythm.      Heart sounds: No murmur heard.  Pulmonary:      Effort: Pulmonary effort is normal. No respiratory distress.      Breath sounds: No wheezing.   Abdominal:      Palpations: Abdomen is soft.     Musculoskeletal:         General: No deformity.      Cervical back: Neck supple.     Skin:     General: Skin is warm and dry.      Coloration: Skin is not pale.     Neurological:      Mental Status: He is alert.      Motor: No weakness.      Gait: Gait normal.     Psychiatric:         Mood and Affect: Mood normal.         Behavior: Behavior normal.         Thought Content: Thought content normal.                [1]   Family History  Problem Relation Name Age of Onset    Arthritis Mother Aarti     COPD Mother Aarti     Hypertension Mother Aarti     Lung cancer Father      Asthma Brother Luis A     Cancer Brother Luis A         colon    Diabetes Sister Lisbeth     Hypertension Sister Lisbeth     Stomach cancer Brother Patric     Asthma Brother Patric    [2]   Social History  Tobacco Use    Smoking status: Never     Passive exposure: Past    Smokeless tobacco: Never    Tobacco comments:     from infancy    Vaping Use    Vaping status: Never Used   Substance Use Topics    Alcohol use: Yes     Alcohol/week: 2.0 standard drinks of alcohol     Types: 2 Glasses of wine per week     Comment: social    Drug use: No

## 2025-06-18 ENCOUNTER — RESULTS FOLLOW-UP (OUTPATIENT)
Dept: FAMILY MEDICINE CLINIC | Facility: CLINIC | Age: 75
End: 2025-06-18

## 2025-06-24 DIAGNOSIS — G43.009 MIGRAINE WITHOUT AURA AND WITHOUT STATUS MIGRAINOSUS, NOT INTRACTABLE: ICD-10-CM

## 2025-06-24 RX ORDER — VERAPAMIL HYDROCHLORIDE 240 MG/1
240 TABLET, FILM COATED, EXTENDED RELEASE ORAL
Qty: 90 TABLET | Refills: 1 | Status: SHIPPED | OUTPATIENT
Start: 2025-06-24

## 2025-07-09 DIAGNOSIS — I10 BENIGN ESSENTIAL HYPERTENSION: ICD-10-CM

## 2025-07-09 DIAGNOSIS — G43.009 MIGRAINE WITHOUT AURA AND WITHOUT STATUS MIGRAINOSUS, NOT INTRACTABLE: ICD-10-CM

## 2025-07-09 RX ORDER — PROPRANOLOL HYDROCHLORIDE 160 MG/1
160 CAPSULE, EXTENDED RELEASE ORAL DAILY
Qty: 90 CAPSULE | Refills: 0 | Status: SHIPPED | OUTPATIENT
Start: 2025-07-09

## 2025-08-18 DIAGNOSIS — N18.2 TYPE 2 DIABETES MELLITUS WITH STAGE 2 CHRONIC KIDNEY DISEASE, WITHOUT LONG-TERM CURRENT USE OF INSULIN  (HCC): ICD-10-CM

## 2025-08-18 DIAGNOSIS — E11.22 TYPE 2 DIABETES MELLITUS WITH STAGE 2 CHRONIC KIDNEY DISEASE, WITHOUT LONG-TERM CURRENT USE OF INSULIN  (HCC): ICD-10-CM
